# Patient Record
Sex: FEMALE | Race: WHITE | Employment: OTHER | URBAN - NONMETROPOLITAN AREA
[De-identification: names, ages, dates, MRNs, and addresses within clinical notes are randomized per-mention and may not be internally consistent; named-entity substitution may affect disease eponyms.]

---

## 2017-02-07 PROBLEM — K56.609 LARGE BOWEL OBSTRUCTION (HCC): Status: ACTIVE | Noted: 2017-02-07

## 2017-02-08 PROBLEM — D50.9 MICROCYTIC HYPOCHROMIC ANEMIA: Status: ACTIVE | Noted: 2017-02-08

## 2017-02-21 ENCOUNTER — TELEPHONE (OUTPATIENT)
Age: 27
End: 2017-02-21

## 2017-02-27 ENCOUNTER — OFFICE VISIT (OUTPATIENT)
Age: 27
End: 2017-02-27

## 2017-02-27 VITALS
OXYGEN SATURATION: 94 % | HEART RATE: 66 BPM | SYSTOLIC BLOOD PRESSURE: 90 MMHG | TEMPERATURE: 98.3 F | WEIGHT: 140 LBS | HEIGHT: 64 IN | DIASTOLIC BLOOD PRESSURE: 66 MMHG | BODY MASS INDEX: 23.9 KG/M2 | RESPIRATION RATE: 16 BRPM

## 2017-02-27 DIAGNOSIS — K56.609 COLONIC OBSTRUCTION (HCC): ICD-10-CM

## 2017-02-27 DIAGNOSIS — R14.0 ABDOMINAL DISTENSION: Primary | ICD-10-CM

## 2017-02-27 PROCEDURE — 99214 OFFICE O/P EST MOD 30 MIN: CPT | Performed by: SURGERY

## 2017-02-27 ASSESSMENT — ENCOUNTER SYMPTOMS
TROUBLE SWALLOWING: 0
NAUSEA: 0
BACK PAIN: 0
CONSTIPATION: 0
DIARRHEA: 0
SHORTNESS OF BREATH: 0
ABDOMINAL PAIN: 0
COUGH: 0
ABDOMINAL DISTENTION: 0

## 2017-03-07 ENCOUNTER — OFFICE VISIT (OUTPATIENT)
Dept: CARDIOLOGY | Age: 27
End: 2017-03-07

## 2017-03-07 VITALS
WEIGHT: 138 LBS | HEART RATE: 72 BPM | DIASTOLIC BLOOD PRESSURE: 62 MMHG | BODY MASS INDEX: 23.56 KG/M2 | SYSTOLIC BLOOD PRESSURE: 98 MMHG | HEIGHT: 64 IN

## 2017-03-07 DIAGNOSIS — Z01.810 PREOP CARDIOVASCULAR EXAM: ICD-10-CM

## 2017-03-07 PROBLEM — I95.1 POSTURAL HYPOTENSION: Status: ACTIVE | Noted: 2017-03-07

## 2017-03-07 PROCEDURE — 99213 OFFICE O/P EST LOW 20 MIN: CPT | Performed by: INTERNAL MEDICINE

## 2017-03-07 RX ORDER — MIDODRINE HYDROCHLORIDE 2.5 MG/1
2.5 TABLET ORAL 2 TIMES DAILY
Qty: 180 TABLET | Refills: 3 | Status: SHIPPED | OUTPATIENT
Start: 2017-03-07 | End: 2017-06-27 | Stop reason: DRUGHIGH

## 2017-03-20 ENCOUNTER — TELEPHONE (OUTPATIENT)
Dept: PULMONOLOGY | Age: 27
End: 2017-03-20

## 2017-03-20 ENCOUNTER — OFFICE VISIT (OUTPATIENT)
Dept: PULMONOLOGY | Age: 27
End: 2017-03-20

## 2017-03-20 VITALS
HEIGHT: 64 IN | BODY MASS INDEX: 24.59 KG/M2 | DIASTOLIC BLOOD PRESSURE: 64 MMHG | WEIGHT: 144 LBS | HEART RATE: 69 BPM | SYSTOLIC BLOOD PRESSURE: 96 MMHG | OXYGEN SATURATION: 98 % | TEMPERATURE: 97.3 F

## 2017-03-20 DIAGNOSIS — R93.89 ABNORMAL CHEST X-RAY: ICD-10-CM

## 2017-03-20 DIAGNOSIS — G80.9 CEREBRAL PALSY, UNSPECIFIED TYPE (HCC): ICD-10-CM

## 2017-03-20 DIAGNOSIS — G47.10 HYPERSOMNIA: Primary | ICD-10-CM

## 2017-03-20 DIAGNOSIS — J45.909 MODERATE ASTHMA: ICD-10-CM

## 2017-03-20 PROCEDURE — 99214 OFFICE O/P EST MOD 30 MIN: CPT | Performed by: INTERNAL MEDICINE

## 2017-03-20 RX ORDER — BUDESONIDE 0.5 MG/2ML
1 INHALANT ORAL 2 TIMES DAILY
Qty: 60 AMPULE | Refills: 11 | Status: SHIPPED | OUTPATIENT
Start: 2017-03-20 | End: 2018-03-31 | Stop reason: SDUPTHER

## 2017-03-20 RX ORDER — ALBUTEROL SULFATE 2.5 MG/3ML
2.5 SOLUTION RESPIRATORY (INHALATION) EVERY 6 HOURS PRN
Qty: 120 VIAL | Refills: 11 | Status: SHIPPED | OUTPATIENT
Start: 2017-03-20 | End: 2018-03-31 | Stop reason: SDUPTHER

## 2017-06-27 ENCOUNTER — OFFICE VISIT (OUTPATIENT)
Dept: CARDIOLOGY | Age: 27
End: 2017-06-27

## 2017-06-27 VITALS
HEART RATE: 84 BPM | DIASTOLIC BLOOD PRESSURE: 50 MMHG | BODY MASS INDEX: 25.27 KG/M2 | SYSTOLIC BLOOD PRESSURE: 88 MMHG | HEIGHT: 64 IN | WEIGHT: 148 LBS

## 2017-06-27 DIAGNOSIS — I95.1 POSTURAL HYPOTENSION: ICD-10-CM

## 2017-06-27 DIAGNOSIS — R00.0 TACHYCARDIA: ICD-10-CM

## 2017-06-27 DIAGNOSIS — Z00.00 ANNUAL PHYSICAL EXAM: Primary | ICD-10-CM

## 2017-06-27 PROCEDURE — 99213 OFFICE O/P EST LOW 20 MIN: CPT | Performed by: INTERNAL MEDICINE

## 2017-06-27 PROCEDURE — 93000 ELECTROCARDIOGRAM COMPLETE: CPT | Performed by: INTERNAL MEDICINE

## 2017-06-27 RX ORDER — MIDODRINE HYDROCHLORIDE 5 MG/1
5 TABLET ORAL 2 TIMES DAILY
Qty: 90 TABLET | Refills: 0 | Status: SHIPPED | OUTPATIENT
Start: 2017-06-27 | End: 2017-09-06 | Stop reason: SDUPTHER

## 2017-06-27 RX ORDER — MIDODRINE HYDROCHLORIDE 5 MG/1
5 TABLET ORAL 2 TIMES DAILY
COMMUNITY
End: 2017-06-27 | Stop reason: SDUPTHER

## 2017-06-27 RX ORDER — MINOCYCLINE HYDROCHLORIDE 100 MG/1
100 CAPSULE ORAL 2 TIMES DAILY
COMMUNITY
End: 2019-08-01

## 2017-09-07 ENCOUNTER — HOSPITAL ENCOUNTER (EMERGENCY)
Age: 27
Discharge: HOME | End: 2017-09-07
Payer: COMMERCIAL

## 2017-09-07 ENCOUNTER — HOSPITAL ENCOUNTER (EMERGENCY)
Dept: GENERAL RADIOLOGY | Age: 27
Discharge: HOME | End: 2017-09-07
Payer: COMMERCIAL

## 2017-09-07 VITALS
OXYGEN SATURATION: 94 % | SYSTOLIC BLOOD PRESSURE: 114 MMHG | HEART RATE: 97 BPM | TEMPERATURE: 100 F | DIASTOLIC BLOOD PRESSURE: 74 MMHG | RESPIRATION RATE: 18 BRPM | WEIGHT: 155.6 LBS | BODY MASS INDEX: 25.92 KG/M2 | HEIGHT: 65 IN

## 2017-09-07 DIAGNOSIS — J40 BRONCHITIS: ICD-10-CM

## 2017-09-07 DIAGNOSIS — J45.901 ASTHMA WITH ACUTE EXACERBATION, UNSPECIFIED ASTHMA SEVERITY: Primary | ICD-10-CM

## 2017-09-07 PROCEDURE — 71020 XR CHEST STANDARD TWO VW: CPT

## 2017-09-07 PROCEDURE — 99213 OFFICE O/P EST LOW 20 MIN: CPT | Performed by: NURSE PRACTITIONER

## 2017-09-07 PROCEDURE — 6370000000 HC RX 637 (ALT 250 FOR IP): Performed by: NURSE PRACTITIONER

## 2017-09-07 PROCEDURE — 99215 OFFICE O/P EST HI 40 MIN: CPT

## 2017-09-07 RX ORDER — AZITHROMYCIN 250 MG
CAPSULE ORAL
Qty: 6 TABLET | Refills: 0 | Status: SHIPPED | OUTPATIENT
Start: 2017-09-07 | End: 2017-09-17

## 2017-09-07 RX ORDER — IPRATROPIUM BROMIDE AND ALBUTEROL SULFATE 2.5; .5 MG/3ML; MG/3ML
1 SOLUTION RESPIRATORY (INHALATION) ONCE
Status: COMPLETED | OUTPATIENT
Start: 2017-09-07 | End: 2017-09-07

## 2017-09-07 RX ORDER — PREDNISONE 20 MG/1
20 TABLET ORAL 2 TIMES DAILY
Qty: 10 TABLET | Refills: 0 | Status: SHIPPED | OUTPATIENT
Start: 2017-09-07 | End: 2017-09-12

## 2017-09-07 RX ORDER — MIDODRINE HYDROCHLORIDE 5 MG/1
TABLET ORAL
Qty: 180 TABLET | Refills: 0 | Status: SHIPPED | OUTPATIENT
Start: 2017-09-07 | End: 2017-12-19 | Stop reason: SDUPTHER

## 2017-09-07 RX ORDER — DEXTROMETHORPHAN HYDROBROMIDE AND PROMETHAZINE HYDROCHLORIDE 15; 6.25 MG/5ML; MG/5ML
5 SYRUP ORAL 4 TIMES DAILY PRN
Qty: 50 ML | Refills: 0 | Status: SHIPPED | OUTPATIENT
Start: 2017-09-07 | End: 2017-09-14

## 2017-09-07 RX ADMIN — IPRATROPIUM BROMIDE AND ALBUTEROL SULFATE 1 AMPULE: .5; 3 SOLUTION RESPIRATORY (INHALATION) at 19:54

## 2017-09-07 ASSESSMENT — ENCOUNTER SYMPTOMS
DIARRHEA: 0
WHEEZING: 0
COUGH: 1
NAUSEA: 0
VOMITING: 0
RHINORRHEA: 1
ABDOMINAL PAIN: 0
COLOR CHANGE: 0

## 2017-09-07 ASSESSMENT — PAIN DESCRIPTION - LOCATION: LOCATION: CHEST

## 2017-09-07 ASSESSMENT — PAIN SCALES - WONG BAKER: WONGBAKER_NUMERICALRESPONSE: 2

## 2017-11-07 ENCOUNTER — OFFICE VISIT (OUTPATIENT)
Dept: PULMONOLOGY | Age: 27
End: 2017-11-07
Payer: COMMERCIAL

## 2017-11-07 VITALS
BODY MASS INDEX: 26.69 KG/M2 | HEIGHT: 65 IN | HEART RATE: 76 BPM | DIASTOLIC BLOOD PRESSURE: 70 MMHG | OXYGEN SATURATION: 98 % | WEIGHT: 160.2 LBS | SYSTOLIC BLOOD PRESSURE: 102 MMHG | TEMPERATURE: 98.3 F

## 2017-11-07 DIAGNOSIS — R06.83 SNORING: ICD-10-CM

## 2017-11-07 DIAGNOSIS — J96.11 CHRONIC RESPIRATORY FAILURE WITH HYPOXIA (HCC): ICD-10-CM

## 2017-11-07 DIAGNOSIS — R06.00 DYSPNEA AND RESPIRATORY ABNORMALITIES: ICD-10-CM

## 2017-11-07 DIAGNOSIS — G80.9 CEREBRAL PALSY, UNSPECIFIED TYPE (HCC): ICD-10-CM

## 2017-11-07 DIAGNOSIS — J98.6 PARALYSIS OF DIAPHRAGM: ICD-10-CM

## 2017-11-07 DIAGNOSIS — G47.19 EXCESSIVE DAYTIME SLEEPINESS: ICD-10-CM

## 2017-11-07 DIAGNOSIS — J45.909 MODERATE ASTHMA WITHOUT COMPLICATION, UNSPECIFIED WHETHER PERSISTENT: ICD-10-CM

## 2017-11-07 DIAGNOSIS — R06.89 DYSPNEA AND RESPIRATORY ABNORMALITIES: ICD-10-CM

## 2017-11-07 PROCEDURE — G8484 FLU IMMUNIZE NO ADMIN: HCPCS | Performed by: PHYSICIAN ASSISTANT

## 2017-11-07 PROCEDURE — G8427 DOCREV CUR MEDS BY ELIG CLIN: HCPCS | Performed by: PHYSICIAN ASSISTANT

## 2017-11-07 PROCEDURE — G8598 ASA/ANTIPLAT THER USED: HCPCS | Performed by: PHYSICIAN ASSISTANT

## 2017-11-07 PROCEDURE — 94620 6 MIN WALK TEST: CPT | Performed by: PHYSICIAN ASSISTANT

## 2017-11-07 PROCEDURE — G8417 CALC BMI ABV UP PARAM F/U: HCPCS | Performed by: PHYSICIAN ASSISTANT

## 2017-11-07 PROCEDURE — 1036F TOBACCO NON-USER: CPT | Performed by: PHYSICIAN ASSISTANT

## 2017-11-07 PROCEDURE — 99215 OFFICE O/P EST HI 40 MIN: CPT | Performed by: PHYSICIAN ASSISTANT

## 2017-11-07 ASSESSMENT — ENCOUNTER SYMPTOMS
NAUSEA: 0
COUGH: 1
WHEEZING: 1
VOMITING: 0
HEARTBURN: 0
SHORTNESS OF BREATH: 1
EYES NEGATIVE: 1

## 2017-11-07 NOTE — PROGRESS NOTES
[facility]    Patient: Joao Velez, 28 y.o.   : 1990  2017    Pt of Dr. Adam Beth   Patient presents with    Asthma     8 month fu with CXR 17, need 6 minute walk per [facility]        HPI  Josue Xiong is here for a 8 month follow up for asthma with a CXR 17. She uses O2 at night and with exertion. She has been coughing and having runny nose periodically. She was seen in urgent care and was treated with ATB. She had drops her sats with URI and bronchitis. She had trouble with increased dyspnea with humidity but had improvement with O2. She uses neb treatments with Pulmicort and albuterol. They are helpful. She has been seen at [facility] and a diaphragm pacer was recommended but her mom declined. She has trouble coughing up secretions. She is not capable of using Acapella secondary to cognative function. Progress History:   Since last visit any new medical issues? Yes Bronchitis  New ER or hospitlal visits? No  Any new or changes in medicines? No  Using inhalers? Yes Pulmicort and Albuterol  Are they helpful? Yes     LAST Office visit with Dr. Martha Khan  3/20/2017  Assessment:  -Moderate persistent bronchial asthma- Under control with current treatment.  -Nocturnal hypoxia- On Home O2 at night time. ? Sleep apnea. 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 She was evaluated at [facility]. Patient mother did not want to go for [facility] for diaphragmatic pacemaker placement as recommended by [facility] pulmonary.  -Dyspnea due to Paralysis of right hemidiaphragm Vs Moderate persistent bronchial asthma- improved  -Acute respiratory failure due to  Mucus plugging requiring intubation S/p bronch on 3/28/16. All cultures were negative so far. -Abnormal CT chest with chronic changes due to post inflammatory scarring with hx of Right lowe lobe aspiration pneumonia. She was treated with antibiotics.   -Hypersomnia ( Excessive daytime sleepiness)may be due to obstructive sleep apnea  Vs Inadequate sleep hygiene- she never had sleep test so far. -Hx of Staphylococcus hominis ssp. Hominis in pleural fluid- most likely due to contamination from skin Danni  -Small bowel obstruction with laparotomy,lysis of adhesions reduction of internal hernia and closure of defect, decompression small bowel contents per Dr. Andrade Nunes on 3/25/16        Plan:  -Continue pulmicort 0.5mg via neb bid. Refills given  -Continue Albuterol 2.5mg via nebs Q6h prn in alternation with Albuterol HFA 2 puffs Q6h i.e Patient advised to not to take both inhaler and nebs at a time.  -She was advised to practice deep breathing exercises and continue incentive spirometry to use Q4h as tolerated. -Will reschedule patient for nocturnal polysomnogram (Sleep study) at [facility] sleep lab. Patient educated to not to drive any motor vehicles or operate heavy equipment until sleep symptoms are under control. Patient verbalizes understanding. Patient to follow with my clinic at [address] [place] [address] 1week after sleep study ( Sleep clinic at [facility])  -Verna Rivera needs no home O2 at rest. She needs 1LPM of home O2 with exercise and 2LPM of home O2 at night time. - She was educated to follow speech pathology recommendations i.e aspiration precautions. - Patient educated to update his pneumococcal vaccine with family physician and take influenza vaccine in coming season with out fail. Patient verbalizes understanding. .  - Keep scheduled appt with Neurologist for further evaluation of her cerebral palsy and muscle weakness. .  - Schedule patient for follow up with my clinic in 6months for clinical re evaluation. Patient advised to make early appointment if needed. Keira Tabares and her mother were educated about my impression and plan. They verbalizes understanding.        Past Medical hx   PMH:  Past Medical History:   Diagnosis Date    Anemia     Asthma     [facility] [address]     COPD (chronic obstructive pulmonary disease) (HCC)     GERD (gastroesophageal reflux disease)     Heart attack 3/2016    Mental retardation     Sch[facility]ia simplex [facility] [address]      SURGICAL HISTORY:  Past Surgical History:   Procedure Laterality Date    ABDOMINAL EXPLORATION SURGERY  5/27/16    lysis of adhesions    COLON SURGERY      COLONOSCOPY      EYE SURGERY  1996    GASTRIC FUNDOPLICATION      LAPAROTOMY  3/25/16    exploratory, lysis of adhesions, reduction of internal hernia     SOCIAL HISTORY:  Social History   Substance Use Topics    Smoking status: Passive Smoke Exposure - Never Smoker    Smokeless tobacco: Never Used    Alcohol use No     ALLERGIES:No Known Allergies  FAMILY HISTORY:  Family History   Problem Relation Age of Onset    Diabetes Mother     High Blood Pressure Mother     Colon Cancer Neg Hx     Breast Cancer Neg Hx     Cancer Neg Hx      CURRENT MEDICATIONS:  Current Outpatient Prescriptions   Medication Sig Dispense Refill    midodrine (PROAMATINE) 5 MG tablet take 1 tablet by mouth twice a day 180 tablet 0    minocycline (MINOCIN;DYNACIN) 100 MG capsule Take 100 mg by mouth 2 times daily      metoprolol tartrate (LOPRESSOR) 25 MG tablet Take 1 tablet by mouth 2 times daily 180 tablet 1    budesonide (PULMICORT) 0.5 MG/2ML nebulizer suspension Take 2 mLs by nebulization 2 times daily 60 ampule 11    albuterol (PROVENTIL) (2.5 MG/3ML) 0.083% nebulizer solution Take 3 mLs by nebulization every 6 hours as needed for Wheezing or Shortness of Breath 120 vial 11    Cetirizine HCl (ZYRTEC ALLERGY CHILDRENS) 10 MG TBDP Take 10 mg by mouth daily 15 tablet 0    docusate sodium (COLACE) 100 MG capsule Take 100 mg by mouth 2 times daily       QUEtiapine (SEROQUEL) 200 MG tablet Take 300 mg by mouth nightly       Sertraline HCl (ZOLOFT PO) Take 150 mg by mouth every morning       ALPRAZolam (XANAX PO) Take 1 mg by mouth 3 times daily as needed.        No current facility-administered medications for this visit. Vilma FERREIRA   Review of Systems   Constitutional: Negative for chills and fever. HENT: Negative for congestion. Eyes: Negative. Respiratory: Positive for cough, shortness of breath and wheezing. Cardiovascular: Negative for chest pain, palpitations and leg swelling. Gastrointestinal: Negative for heartburn, nausea and vomiting. Genitourinary: Negative for dysuria. Neurological:        MRDD   Endo/Heme/Allergies: Negative. All other systems reviewed and are negative. Physical exam   /70 (Site: Right Arm, Position: Sitting, Cuff Size: Large Adult)   Pulse 76   Temp 98.3 °F (36.8 °C) (Oral)   Ht 5' 5\" (1.651 m)   Wt 160 lb 3.2 oz (72.7 kg)   SpO2 98% Comment: RA at rest  BMI 26.66 kg/m²    Neck Circumference -  14.75in. Mallampati - 4    Physical Exam   Constitutional: She is oriented to person, place, and time and well-developed, well-nourished, and in no distress. HENT:   Head: Normocephalic and atraumatic. Mouth/Throat: No oropharyngeal exudate. Eyes: Conjunctivae and EOM are normal. Pupils are equal, round, and reactive to light. Neck: Normal range of motion. Neck supple. Cardiovascular: Normal rate, regular rhythm and normal heart sounds. Exam reveals no friction rub. No murmur heard. Pulmonary/Chest: She has no wheezes. She has no rales. She appears to have abdominal breathing   Abdominal: Bowel sounds are normal. She exhibits no distension. There is no tenderness. Musculoskeletal: Normal range of motion. She exhibits no edema or tenderness. Neurological: She is alert and oriented to person, place, and time. Skin: Skin is warm and dry. No rash noted. Psychiatric: Affect and judgment normal.   Nursing note and vitals reviewed. Test results   CXR-  9/07/2017  NO EVIDENCE OF ACUTE CARDIOPULMONARY PROCESS. Sniff test 7/26/2016   1.   Elevated right hemidiaphragm which demonstrates only minimal excursion suggestive

## 2017-11-29 ENCOUNTER — HOSPITAL ENCOUNTER (OUTPATIENT)
Dept: RESPIRATORY THERAPY | Age: 27
Discharge: HOME | End: 2017-11-29
Payer: COMMERCIAL

## 2017-11-29 PROCEDURE — 94762 N-INVAS EAR/PLS OXIMTRY CONT: CPT

## 2017-12-08 ENCOUNTER — TELEPHONE (OUTPATIENT)
Dept: SLEEP CENTER | Age: 27
End: 2017-12-08

## 2017-12-08 NOTE — TELEPHONE ENCOUNTER
Her mother has declined in lab PSG in the past.  Please check and see if she will pursue and I will order

## 2017-12-08 NOTE — TELEPHONE ENCOUNTER
From [employer];  Good Morning,  is scheduled for an HST, but she has [employer] and they dont cover HSTs. Let me know if it will be changed to in-lab PSG. If the patient is willing to come in for a PSG we can get her scheduled. Hopefully this will clear up the [address] and HST confusion. Please advise.   May Corcoran, SUSHIL

## 2017-12-13 ENCOUNTER — TELEPHONE (OUTPATIENT)
Dept: PULMONOLOGY | Age: 27
End: 2017-12-13

## 2017-12-18 ENCOUNTER — TELEPHONE (OUTPATIENT)
Dept: SLEEP CENTER | Age: 27
End: 2017-12-18

## 2017-12-19 RX ORDER — MIDODRINE HYDROCHLORIDE 5 MG/1
TABLET ORAL
Qty: 180 TABLET | Refills: 0 | Status: SHIPPED | OUTPATIENT
Start: 2017-12-19 | End: 2018-03-28 | Stop reason: SDUPTHER

## 2018-01-19 ENCOUNTER — OFFICE VISIT (OUTPATIENT)
Dept: CARDIOLOGY CLINIC | Age: 28
End: 2018-01-19
Payer: COMMERCIAL

## 2018-01-19 ENCOUNTER — HOSPITAL ENCOUNTER (OUTPATIENT)
Dept: GENERAL RADIOLOGY | Age: 28
Discharge: HOME | End: 2018-01-19
Payer: COMMERCIAL

## 2018-01-19 ENCOUNTER — HOSPITAL ENCOUNTER (OUTPATIENT)
Age: 28
Discharge: HOME | End: 2018-01-19
Payer: COMMERCIAL

## 2018-01-19 VITALS
HEIGHT: 65 IN | SYSTOLIC BLOOD PRESSURE: 100 MMHG | DIASTOLIC BLOOD PRESSURE: 62 MMHG | BODY MASS INDEX: 26.49 KG/M2 | HEART RATE: 62 BPM | WEIGHT: 159 LBS

## 2018-01-19 DIAGNOSIS — I95.0 IDIOPATHIC HYPOTENSION: Primary | ICD-10-CM

## 2018-01-19 DIAGNOSIS — R14.0 ABDOMINAL BLOATING: ICD-10-CM

## 2018-01-19 DIAGNOSIS — R42 DIZZINESS: ICD-10-CM

## 2018-01-19 PROCEDURE — 1036F TOBACCO NON-USER: CPT | Performed by: NUCLEAR MEDICINE

## 2018-01-19 PROCEDURE — G8484 FLU IMMUNIZE NO ADMIN: HCPCS | Performed by: NUCLEAR MEDICINE

## 2018-01-19 PROCEDURE — 99213 OFFICE O/P EST LOW 20 MIN: CPT | Performed by: NUCLEAR MEDICINE

## 2018-01-19 PROCEDURE — 74018 RADEX ABDOMEN 1 VIEW: CPT

## 2018-01-19 PROCEDURE — G8599 NO ASA/ANTIPLAT THER USE RNG: HCPCS | Performed by: NUCLEAR MEDICINE

## 2018-01-19 PROCEDURE — G8428 CUR MEDS NOT DOCUMENT: HCPCS | Performed by: NUCLEAR MEDICINE

## 2018-01-19 PROCEDURE — G8417 CALC BMI ABV UP PARAM F/U: HCPCS | Performed by: NUCLEAR MEDICINE

## 2018-01-19 NOTE — PROGRESS NOTES
(PROVENTIL) (2.5 MG/3ML) 0.083% nebulizer solution Take 3 mLs by nebulization every 6 hours as needed for Wheezing or Shortness of Breath 120 vial 11    Cetirizine HCl (ZYRTEC ALLERGY CHILDRENS) 10 MG TBDP Take 10 mg by mouth daily 15 tablet 0    docusate sodium (COLACE) 100 MG capsule Take 100 mg by mouth 2 times daily       QUEtiapine (SEROQUEL) 200 MG tablet Take 300 mg by mouth nightly       Sertraline HCl (ZOLOFT PO) Take 150 mg by mouth every morning       ALPRAZolam (XANAX PO) Take 1 mg by mouth 3 times daily as needed. No current facility-administered medications for this visit. No Known Allergies  Health Maintenance   Topic Date Due    HIV screen  03/28/2004    DTaP/Tdap/Td vaccine (1 - Tdap) 03/28/2008    Pneumococcal med risk (1 of 1 - PPSV23) 03/28/2008    Cervical cancer screen  03/28/2010    Flu vaccine (1) 09/01/2017       Subjective:  Review of Systems  General:   No fever, no chills, No fatigue or weight loss  Pulmonary:    some dyspnea, no wheezing  Cardiac:    Denies recent chest pain,   GI:     No nausea or vomiting, no abdominal pain  Neuro:    No dizziness or light headedness,   Musculoskeletal:  No recent active issues  Extremities:   No edema, good peripheral pulses      Objective:  Physical Exam  /62   Pulse 62   Ht 5' 5\" (1.651 m)   Wt 159 lb (72.1 kg)   BMI 26.46 kg/m²   General:   Well developed, well nourished  Lungs:    Clear to auscultation  Heart:    Normal S1 S2, Slight murmur. no rubs, no gallops  Abdomen:   Soft, non tender, no organomegalies, positive bowel sounds  Extremities:   No edema, no cyanosis, good peripheral pulses  Neurological:   Awake, alert, oriented. Slow mentally   Musculoskelatal:  No obvious deformities    Assessment:     1. Idiopathic hypotension     2. Dizziness     likely orthostatsis     Plan:  No Follow-up on file.   Tilt and holter  Cut down caffeine   Continue risk factor modification and medical management  Thank you for

## 2018-03-23 ENCOUNTER — HOSPITAL ENCOUNTER (OUTPATIENT)
Dept: NON INVASIVE DIAGNOSTICS | Age: 28
Discharge: HOME | End: 2018-03-23
Payer: COMMERCIAL

## 2018-03-23 DIAGNOSIS — R42 DIZZINESS: ICD-10-CM

## 2018-03-23 DIAGNOSIS — I95.0 IDIOPATHIC HYPOTENSION: ICD-10-CM

## 2018-03-23 PROCEDURE — 93225 XTRNL ECG REC<48 HRS REC: CPT

## 2018-03-23 PROCEDURE — 93226 XTRNL ECG REC<48 HR SCAN A/R: CPT

## 2018-03-29 RX ORDER — MIDODRINE HYDROCHLORIDE 5 MG/1
TABLET ORAL
Qty: 180 TABLET | Refills: 3 | Status: SHIPPED | OUTPATIENT
Start: 2018-03-29 | End: 2019-08-01 | Stop reason: SDUPTHER

## 2018-03-31 DIAGNOSIS — R93.89 ABNORMAL CHEST X-RAY: ICD-10-CM

## 2018-03-31 DIAGNOSIS — J45.909 MODERATE ASTHMA: ICD-10-CM

## 2018-04-03 ENCOUNTER — TELEPHONE (OUTPATIENT)
Dept: PULMONOLOGY | Age: 28
End: 2018-04-03

## 2018-04-03 RX ORDER — ALBUTEROL SULFATE 2.5 MG/3ML
SOLUTION RESPIRATORY (INHALATION)
Qty: 375 ML | Refills: 11 | Status: SHIPPED | OUTPATIENT
Start: 2018-04-03 | End: 2018-11-06 | Stop reason: SDUPTHER

## 2018-04-03 RX ORDER — BUDESONIDE 0.5 MG/2ML
INHALANT ORAL
Qty: 120 ML | Refills: 11 | Status: SHIPPED | OUTPATIENT
Start: 2018-04-03 | End: 2018-11-06 | Stop reason: SDUPTHER

## 2018-04-04 ENCOUNTER — TELEPHONE (OUTPATIENT)
Dept: PULMONOLOGY | Age: 28
End: 2018-04-04

## 2018-05-07 ENCOUNTER — OFFICE VISIT (OUTPATIENT)
Dept: PULMONOLOGY | Age: 28
End: 2018-05-07
Payer: COMMERCIAL

## 2018-05-07 VITALS
DIASTOLIC BLOOD PRESSURE: 66 MMHG | TEMPERATURE: 98.1 F | HEART RATE: 78 BPM | HEIGHT: 65 IN | SYSTOLIC BLOOD PRESSURE: 102 MMHG | BODY MASS INDEX: 27.49 KG/M2 | WEIGHT: 165 LBS | OXYGEN SATURATION: 98 %

## 2018-05-07 DIAGNOSIS — R06.89 DYSPNEA AND RESPIRATORY ABNORMALITIES: ICD-10-CM

## 2018-05-07 DIAGNOSIS — R06.00 DYSPNEA AND RESPIRATORY ABNORMALITIES: ICD-10-CM

## 2018-05-07 DIAGNOSIS — J98.6 PARALYSIS OF DIAPHRAGM: ICD-10-CM

## 2018-05-07 DIAGNOSIS — J45.909 MODERATE ASTHMA WITHOUT COMPLICATION, UNSPECIFIED WHETHER PERSISTENT: Primary | ICD-10-CM

## 2018-05-07 PROCEDURE — G8417 CALC BMI ABV UP PARAM F/U: HCPCS | Performed by: PHYSICIAN ASSISTANT

## 2018-05-07 PROCEDURE — G8599 NO ASA/ANTIPLAT THER USE RNG: HCPCS | Performed by: PHYSICIAN ASSISTANT

## 2018-05-07 PROCEDURE — 99213 OFFICE O/P EST LOW 20 MIN: CPT | Performed by: PHYSICIAN ASSISTANT

## 2018-05-07 PROCEDURE — G8427 DOCREV CUR MEDS BY ELIG CLIN: HCPCS | Performed by: PHYSICIAN ASSISTANT

## 2018-05-07 PROCEDURE — 1036F TOBACCO NON-USER: CPT | Performed by: PHYSICIAN ASSISTANT

## 2018-05-07 ASSESSMENT — ENCOUNTER SYMPTOMS
GASTROINTESTINAL NEGATIVE: 1
SINUS PAIN: 0
SHORTNESS OF BREATH: 0
NAUSEA: 0
COUGH: 1
WHEEZING: 0
SPUTUM PRODUCTION: 1
HEARTBURN: 0
EYES NEGATIVE: 1
SORE THROAT: 0
ORTHOPNEA: 0

## 2018-09-17 ENCOUNTER — HOSPITAL ENCOUNTER (OUTPATIENT)
Age: 28
Discharge: HOME | End: 2018-09-17
Payer: COMMERCIAL

## 2018-09-17 ENCOUNTER — HOSPITAL ENCOUNTER (OUTPATIENT)
Dept: GENERAL RADIOLOGY | Age: 28
Discharge: HOME | End: 2018-09-17
Payer: COMMERCIAL

## 2018-09-17 DIAGNOSIS — Z87.19 HX OF INTESTINAL OBSTRUCTION: ICD-10-CM

## 2018-09-17 DIAGNOSIS — K59.04 CHRONIC IDIOPATHIC CONSTIPATION: ICD-10-CM

## 2018-09-17 DIAGNOSIS — R10.33 PERIUMBILICAL ABDOMINAL PAIN: ICD-10-CM

## 2018-09-17 PROCEDURE — 74018 RADEX ABDOMEN 1 VIEW: CPT

## 2018-09-21 ENCOUNTER — TELEPHONE (OUTPATIENT)
Dept: CARDIOLOGY CLINIC | Age: 28
End: 2018-09-21

## 2018-09-26 PROBLEM — Z01.810 PREOP CARDIOVASCULAR EXAM: Status: RESOLVED | Noted: 2017-03-07 | Resolved: 2018-09-26

## 2018-10-03 ENCOUNTER — HOSPITAL ENCOUNTER (OUTPATIENT)
Dept: GENERAL RADIOLOGY | Age: 28
Discharge: HOME | End: 2018-10-03
Payer: COMMERCIAL

## 2018-10-03 ENCOUNTER — HOSPITAL ENCOUNTER (OUTPATIENT)
Age: 28
Discharge: HOME | End: 2018-10-03
Payer: COMMERCIAL

## 2018-10-03 DIAGNOSIS — K56.0 PARALYTIC ILEUS (HCC): ICD-10-CM

## 2018-10-03 DIAGNOSIS — Z87.19 HISTORY OF CONSTIPATION: ICD-10-CM

## 2018-10-03 LAB
ANION GAP SERPL CALCULATED.3IONS-SCNC: 12 MEQ/L (ref 8–16)
BUN BLDV-MCNC: 14 MG/DL (ref 7–22)
CALCIUM SERPL-MCNC: 8.8 MG/DL (ref 8.5–10.5)
CHLORIDE BLD-SCNC: 99 MEQ/L (ref 98–111)
CO2: 25 MEQ/L (ref 23–33)
CREAT SERPL-MCNC: 1 MG/DL (ref 0.4–1.2)
ERYTHROCYTE [DISTWIDTH] IN BLOOD BY AUTOMATED COUNT: 14.9 % (ref 11.5–14.5)
ERYTHROCYTE [DISTWIDTH] IN BLOOD BY AUTOMATED COUNT: 50.3 FL (ref 35–45)
GFR SERPL CREATININE-BSD FRML MDRD: 65 ML/MIN/1.73M2
GLUCOSE BLD-MCNC: 138 MG/DL (ref 70–108)
HCT VFR BLD CALC: 38.8 % (ref 37–47)
HEMOGLOBIN: 12.3 GM/DL (ref 12–16)
MCH RBC QN AUTO: 29.3 PG (ref 26–33)
MCHC RBC AUTO-ENTMCNC: 31.7 GM/DL (ref 32.2–35.5)
MCV RBC AUTO: 92.4 FL (ref 81–99)
PLATELET # BLD: 301 THOU/MM3 (ref 130–400)
PMV BLD AUTO: 11.7 FL (ref 9.4–12.4)
POTASSIUM SERPL-SCNC: 4.2 MEQ/L (ref 3.5–5.2)
RBC # BLD: 4.2 MILL/MM3 (ref 4.2–5.4)
SODIUM BLD-SCNC: 136 MEQ/L (ref 135–145)
WBC # BLD: 5.4 THOU/MM3 (ref 4.8–10.8)

## 2018-10-03 PROCEDURE — 36415 COLL VENOUS BLD VENIPUNCTURE: CPT

## 2018-10-03 PROCEDURE — 80048 BASIC METABOLIC PNL TOTAL CA: CPT

## 2018-10-03 PROCEDURE — 74018 RADEX ABDOMEN 1 VIEW: CPT

## 2018-10-03 PROCEDURE — 85027 COMPLETE CBC AUTOMATED: CPT

## 2018-11-06 ENCOUNTER — OFFICE VISIT (OUTPATIENT)
Dept: PULMONOLOGY | Age: 28
End: 2018-11-06
Payer: COMMERCIAL

## 2018-11-06 VITALS
BODY MASS INDEX: 28.82 KG/M2 | HEIGHT: 65 IN | TEMPERATURE: 98.2 F | OXYGEN SATURATION: 98 % | WEIGHT: 173 LBS | HEART RATE: 73 BPM | DIASTOLIC BLOOD PRESSURE: 60 MMHG | SYSTOLIC BLOOD PRESSURE: 122 MMHG

## 2018-11-06 DIAGNOSIS — G47.19 EXCESSIVE DAYTIME SLEEPINESS: ICD-10-CM

## 2018-11-06 DIAGNOSIS — J45.909 MODERATE ASTHMA WITHOUT COMPLICATION, UNSPECIFIED WHETHER PERSISTENT: ICD-10-CM

## 2018-11-06 DIAGNOSIS — R06.83 SNORING: ICD-10-CM

## 2018-11-06 DIAGNOSIS — J98.6 PARALYSIS OF DIAPHRAGM: ICD-10-CM

## 2018-11-06 PROCEDURE — 1036F TOBACCO NON-USER: CPT | Performed by: NURSE PRACTITIONER

## 2018-11-06 PROCEDURE — G8599 NO ASA/ANTIPLAT THER USE RNG: HCPCS | Performed by: NURSE PRACTITIONER

## 2018-11-06 PROCEDURE — G8417 CALC BMI ABV UP PARAM F/U: HCPCS | Performed by: NURSE PRACTITIONER

## 2018-11-06 PROCEDURE — G8484 FLU IMMUNIZE NO ADMIN: HCPCS | Performed by: NURSE PRACTITIONER

## 2018-11-06 PROCEDURE — G8427 DOCREV CUR MEDS BY ELIG CLIN: HCPCS | Performed by: NURSE PRACTITIONER

## 2018-11-06 PROCEDURE — 99214 OFFICE O/P EST MOD 30 MIN: CPT | Performed by: NURSE PRACTITIONER

## 2018-11-06 RX ORDER — BUDESONIDE 0.5 MG/2ML
INHALANT ORAL
Qty: 120 ML | Refills: 11 | Status: SHIPPED | OUTPATIENT
Start: 2018-11-06 | End: 2019-12-10 | Stop reason: SDUPTHER

## 2018-11-06 RX ORDER — ALBUTEROL SULFATE 2.5 MG/3ML
SOLUTION RESPIRATORY (INHALATION)
Qty: 375 ML | Refills: 11 | Status: SHIPPED | OUTPATIENT
Start: 2018-11-06 | End: 2019-12-10 | Stop reason: SDUPTHER

## 2018-11-06 ASSESSMENT — ENCOUNTER SYMPTOMS
DIARRHEA: 0
ABDOMINAL PAIN: 0
SHORTNESS OF BREATH: 1
NAUSEA: 0
WHEEZING: 1
VOMITING: 0
COUGH: 0
EYES NEGATIVE: 1

## 2018-11-06 NOTE — PROGRESS NOTES
[facility]    Patient: Keyla Toscano, 33 y.o.   : 1990  2018    Pt of Dr. Zac Aguirre   Patient presents with    Follow-up     SOB 6mo f/u, nebulizer machine not working        HPI  Ольга Nicole is here for follow up for SOB   PMH of bronchopulmonary  Dysplasia, diaphragm paralysis, and MRDD. Has had sleep study scheduled in past but patient gets scared and does not follow through with testing  Mother is primary care giver and presents with her today. C/o chronic daytime fatigue and restless sleep with snoring  Occasional SOB. Using pulmicort nebs BID and ALbuterol nebs TID. Requests new nebulizer machine  No ER visits or hospitalizations  No cough or fever. Sleeping habits:    Sleep History:  Pt with history of: snoring, tossing and turning, excessive daytime sleepiness and/or daytime fatigue and morning headache  Morning headache:Yes,   Dryness of mouth in the morning:Yes  Hx of snoring:Yes  Witnessed apneas:No -patterns of abnormal breathing   Excessive day time sleepiness:Yes. See below for Aleppo score  Hypnogogic Hallucinations:NO  Hypnopompic Hallucinations:NO  Symptoms suggestive of Restless leg syndrome:NO  History of Seizures: YES   Sleep Walking:NO  Sleep Talking:NO  Sleep paralysis: NO  Cataplexy: NO      Aleppo Sleepiness Score:   Sitting and readin  Watching TV:0  Sitting inactive in a public place:0  Being a passenger in a motor vehicle for an hour or more:0  Lying down in the afternoon:1  Sitting and talking to someone:0  Sitting quietly after lunch (no alcohol):3  Stopped for a few minutes in traffic while drivin  Total Score:6    She is currently does not work, has MRDD  She denies any difficulty in sleeping at new places . She drinks0 cups of coffee per day. Shedrinks 1 cans of caffeinated beverages i.e sodas per day. Shedrinks 0 cups of tea per day. Shedrinks alcoholic beverages socially.  No history of recreational drug use.       Mallampati airway Class:IV  Neck Circumference:15.5 Inches    Patient considerations: MMRD     Past Medical hx   PMH:  Past Medical History:   Diagnosis Date    Anemia     Asthma     [facility] ([facility] [address]     [facility]     GERD (gastroesophageal reflux disease)     Heart attack ([facility] [address] 3/2016    Mental retardation     Schizophrenia simplex ([facility]      SURGICAL HISTORY:  Past Surgical History:   Procedure Laterality Date    ABDOMINAL EXPLORATION SURGERY  5/27/16    lysis of adhesions    COLON SURGERY      COLONOSCOPY      EYE SURGERY  1996    GASTRIC FUNDOPLICATION      LAPAROTOMY  3/25/16    exploratory, lysis of adhesions, reduction of internal hernia     SOCIAL HISTORY:  Social History   Substance Use Topics    Smoking status: Passive Smoke Exposure - Never Smoker    Smokeless tobacco: Never Used    Alcohol use No     ALLERGIES:No Known Allergies  FAMILY HISTORY:  Family History   Problem Relation Age of Onset    Diabetes Mother     High Blood Pressure Mother     Colon Cancer Neg Hx     Breast Cancer Neg Hx     Cancer Neg Hx      CURRENT MEDICATIONS:  Current Outpatient Prescriptions   Medication Sig Dispense Refill    budesonide (PULMICORT) 0.5 MG/2ML nebulizer suspension inhale contents of 1 vial in nebulizer twice a day 120 mL 11    albuterol (PROVENTIL) (2.5 MG/3ML) 0.083% nebulizer solution inhale contents of 1 vial in nebulizer every 6 hours if needed FOR WHEEZING OR SHORTNESS OF BREATH 375 mL 11    psyllium (METAMUCIL) 0.52 g capsule Take 1.04 g by mouth daily      polyethylene glycol (MIRALAX) powder Take 17 g by mouth daily 510 g 6    etonogestrel (NEXPLANON) 68 MG implant 68 mg by Subdermal route once      bisacodyl (DULCOLAX) 5 MG EC tablet Take 2 tablets by mouth daily as needed for Constipation 60 tablet 3    midodrine (PROAMATINE) 5 MG tablet take 1 tablet by mouth twice a day 180 tablet 3   

## 2018-12-04 ENCOUNTER — TELEPHONE (OUTPATIENT)
Dept: SLEEP CENTER | Age: 28
End: 2018-12-04

## 2018-12-04 NOTE — TELEPHONE ENCOUNTER
I will also forward this message to Ms Tiffanie Souza. Patient is currently following with her clinic.

## 2018-12-04 NOTE — TELEPHONE ENCOUNTER
Mendel Casiano was scheduled for a Baseline PSG Study [facility]. When I reached mother , Vibha Herr she informed me Mendel Casiano didn't want to do the sleep study and she could not make her. I cancelled the study and let her know I would notify her [address] Follow up cancelled also.     Mely Gil

## 2018-12-18 ENCOUNTER — HOSPITAL ENCOUNTER (OUTPATIENT)
Dept: GENERAL RADIOLOGY | Age: 28
Discharge: HOME | End: 2018-12-18
Payer: COMMERCIAL

## 2018-12-18 ENCOUNTER — HOSPITAL ENCOUNTER (OUTPATIENT)
Age: 28
Discharge: HOME | End: 2018-12-18
Payer: COMMERCIAL

## 2018-12-18 DIAGNOSIS — Z87.19 HISTORY OF ILEUS: ICD-10-CM

## 2018-12-18 DIAGNOSIS — Z87.19 HISTORY OF SMALL BOWEL OBSTRUCTION: ICD-10-CM

## 2018-12-18 DIAGNOSIS — K59.09 CHRONIC CONSTIPATION: ICD-10-CM

## 2018-12-18 LAB
ALBUMIN SERPL-MCNC: 4.3 G/DL (ref 3.5–5.1)
ALP BLD-CCNC: 104 U/L (ref 38–126)
ALT SERPL-CCNC: 12 U/L (ref 11–66)
ANION GAP SERPL CALCULATED.3IONS-SCNC: 14 MEQ/L (ref 8–16)
AST SERPL-CCNC: 18 U/L (ref 5–40)
AVERAGE GLUCOSE: 108 MG/DL (ref 70–126)
BASOPHILS # BLD: 0.7 %
BASOPHILS ABSOLUTE: 0 THOU/MM3 (ref 0–0.1)
BILIRUB SERPL-MCNC: 0.6 MG/DL (ref 0.3–1.2)
BUN BLDV-MCNC: 8 MG/DL (ref 7–22)
CALCIUM SERPL-MCNC: 9.5 MG/DL (ref 8.5–10.5)
CHLORIDE BLD-SCNC: 98 MEQ/L (ref 98–111)
CHOLESTEROL, TOTAL: 151 MG/DL (ref 100–199)
CO2: 25 MEQ/L (ref 23–33)
CREAT SERPL-MCNC: 0.9 MG/DL (ref 0.4–1.2)
EOSINOPHIL # BLD: 2.4 %
EOSINOPHILS ABSOLUTE: 0.1 THOU/MM3 (ref 0–0.4)
ERYTHROCYTE [DISTWIDTH] IN BLOOD BY AUTOMATED COUNT: 13 % (ref 11.5–14.5)
ERYTHROCYTE [DISTWIDTH] IN BLOOD BY AUTOMATED COUNT: 42.5 FL (ref 35–45)
GFR SERPL CREATININE-BSD FRML MDRD: 74 ML/MIN/1.73M2
GLUCOSE BLD-MCNC: 104 MG/DL (ref 70–108)
HBA1C MFR BLD: 5.6 % (ref 4.4–6.4)
HCT VFR BLD CALC: 42.1 % (ref 37–47)
HDLC SERPL-MCNC: 45 MG/DL
HEMOGLOBIN: 13.2 GM/DL (ref 12–16)
IMMATURE GRANS (ABS): 0.01 THOU/MM3 (ref 0–0.07)
IMMATURE GRANULOCYTES: 0.2 %
LDL CHOLESTEROL CALCULATED: 86 MG/DL
LYMPHOCYTES # BLD: 36.3 %
LYMPHOCYTES ABSOLUTE: 2 THOU/MM3 (ref 1–4.8)
MCH RBC QN AUTO: 28.4 PG (ref 26–33)
MCHC RBC AUTO-ENTMCNC: 31.4 GM/DL (ref 32.2–35.5)
MCV RBC AUTO: 90.7 FL (ref 81–99)
MONOCYTES # BLD: 7.1 %
MONOCYTES ABSOLUTE: 0.4 THOU/MM3 (ref 0.4–1.3)
NUCLEATED RED BLOOD CELLS: 0 /100 WBC
PLATELET # BLD: 339 THOU/MM3 (ref 130–400)
PMV BLD AUTO: 11.5 FL (ref 9.4–12.4)
POTASSIUM SERPL-SCNC: 4.3 MEQ/L (ref 3.5–5.2)
RBC # BLD: 4.64 MILL/MM3 (ref 4.2–5.4)
SEG NEUTROPHILS: 53.3 %
SEGMENTED NEUTROPHILS ABSOLUTE COUNT: 2.9 THOU/MM3 (ref 1.8–7.7)
SODIUM BLD-SCNC: 137 MEQ/L (ref 135–145)
TOTAL PROTEIN: 8.3 G/DL (ref 6.1–8)
TRIGL SERPL-MCNC: 98 MG/DL (ref 0–199)
TSH SERPL DL<=0.05 MIU/L-ACNC: 1.79 UIU/ML (ref 0.4–4.2)
WBC # BLD: 5.5 THOU/MM3 (ref 4.8–10.8)

## 2018-12-18 PROCEDURE — 85025 COMPLETE CBC W/AUTO DIFF WBC: CPT

## 2018-12-18 PROCEDURE — 84443 ASSAY THYROID STIM HORMONE: CPT

## 2018-12-18 PROCEDURE — 36415 COLL VENOUS BLD VENIPUNCTURE: CPT

## 2018-12-18 PROCEDURE — 80053 COMPREHEN METABOLIC PANEL: CPT

## 2018-12-18 PROCEDURE — 83036 HEMOGLOBIN GLYCOSYLATED A1C: CPT

## 2018-12-18 PROCEDURE — 80061 LIPID PANEL: CPT

## 2018-12-18 PROCEDURE — 74018 RADEX ABDOMEN 1 VIEW: CPT

## 2018-12-27 ENCOUNTER — HOSPITAL ENCOUNTER (OUTPATIENT)
Dept: GENERAL RADIOLOGY | Age: 28
Discharge: HOME | End: 2018-12-27
Payer: COMMERCIAL

## 2018-12-27 DIAGNOSIS — R14.0 ABDOMINAL DISTENSION: ICD-10-CM

## 2018-12-27 DIAGNOSIS — Z87.19 HX OF INTESTINAL OBSTRUCTION: ICD-10-CM

## 2018-12-27 DIAGNOSIS — K56.0 PARALYTIC ILEUS (HCC): ICD-10-CM

## 2018-12-27 PROCEDURE — 2500000003 HC RX 250 WO HCPCS: Performed by: NURSE PRACTITIONER

## 2018-12-27 PROCEDURE — 74250 X-RAY XM SM INT 1CNTRST STD: CPT

## 2018-12-27 RX ADMIN — BARIUM SULFATE 600 ML: 240 SUSPENSION ORAL at 08:50

## 2018-12-28 ENCOUNTER — TELEPHONE (OUTPATIENT)
Dept: CARDIOLOGY CLINIC | Age: 28
End: 2018-12-28

## 2019-03-27 ENCOUNTER — HOSPITAL ENCOUNTER (OUTPATIENT)
Age: 29
Discharge: HOME | End: 2019-03-27
Payer: COMMERCIAL

## 2019-03-27 ENCOUNTER — HOSPITAL ENCOUNTER (OUTPATIENT)
Dept: GENERAL RADIOLOGY | Age: 29
Discharge: HOME | End: 2019-03-27
Payer: COMMERCIAL

## 2019-03-27 DIAGNOSIS — Z87.19 HISTORY OF ILEUS: ICD-10-CM

## 2019-03-27 DIAGNOSIS — Z87.19 HX OF SMALL BOWEL OBSTRUCTION: ICD-10-CM

## 2019-03-27 LAB
ALBUMIN SERPL-MCNC: 4.3 G/DL (ref 3.5–5.1)
ALP BLD-CCNC: 90 U/L (ref 38–126)
ALT SERPL-CCNC: 11 U/L (ref 11–66)
ANION GAP SERPL CALCULATED.3IONS-SCNC: 16 MEQ/L (ref 8–16)
AST SERPL-CCNC: 20 U/L (ref 5–40)
AVERAGE GLUCOSE: 123 MG/DL (ref 70–126)
BASOPHILS # BLD: 0.7 %
BASOPHILS ABSOLUTE: 0 THOU/MM3 (ref 0–0.1)
BILIRUB SERPL-MCNC: 0.5 MG/DL (ref 0.3–1.2)
BUN BLDV-MCNC: 7 MG/DL (ref 7–22)
CALCIUM SERPL-MCNC: 9.4 MG/DL (ref 8.5–10.5)
CHLORIDE BLD-SCNC: 99 MEQ/L (ref 98–111)
CHOLESTEROL, TOTAL: 162 MG/DL (ref 100–199)
CO2: 25 MEQ/L (ref 23–33)
CREAT SERPL-MCNC: 0.8 MG/DL (ref 0.4–1.2)
EOSINOPHIL # BLD: 1.6 %
EOSINOPHILS ABSOLUTE: 0.1 THOU/MM3 (ref 0–0.4)
ERYTHROCYTE [DISTWIDTH] IN BLOOD BY AUTOMATED COUNT: 13.5 % (ref 11.5–14.5)
ERYTHROCYTE [DISTWIDTH] IN BLOOD BY AUTOMATED COUNT: 43.8 FL (ref 35–45)
GFR SERPL CREATININE-BSD FRML MDRD: 84 ML/MIN/1.73M2
GLUCOSE BLD-MCNC: 79 MG/DL (ref 70–108)
HBA1C MFR BLD: 6.1 % (ref 4.4–6.4)
HCT VFR BLD CALC: 43.2 % (ref 37–47)
HDLC SERPL-MCNC: 36 MG/DL
HEMOGLOBIN: 13.9 GM/DL (ref 12–16)
IMMATURE GRANS (ABS): 0.01 THOU/MM3 (ref 0–0.07)
IMMATURE GRANULOCYTES: 0.2 %
LDL CHOLESTEROL CALCULATED: 105 MG/DL
LYMPHOCYTES # BLD: 31.5 %
LYMPHOCYTES ABSOLUTE: 1.8 THOU/MM3 (ref 1–4.8)
MCH RBC QN AUTO: 28.8 PG (ref 26–33)
MCHC RBC AUTO-ENTMCNC: 32.2 GM/DL (ref 32.2–35.5)
MCV RBC AUTO: 89.6 FL (ref 81–99)
MONOCYTES # BLD: 7.2 %
MONOCYTES ABSOLUTE: 0.4 THOU/MM3 (ref 0.4–1.3)
NUCLEATED RED BLOOD CELLS: 0 /100 WBC
PLATELET # BLD: 303 THOU/MM3 (ref 130–400)
PMV BLD AUTO: 12 FL (ref 9.4–12.4)
POTASSIUM SERPL-SCNC: 4 MEQ/L (ref 3.5–5.2)
RBC # BLD: 4.82 MILL/MM3 (ref 4.2–5.4)
SEG NEUTROPHILS: 58.8 %
SEGMENTED NEUTROPHILS ABSOLUTE COUNT: 3.4 THOU/MM3 (ref 1.8–7.7)
SODIUM BLD-SCNC: 140 MEQ/L (ref 135–145)
TOTAL PROTEIN: 8.2 G/DL (ref 6.1–8)
TRIGL SERPL-MCNC: 105 MG/DL (ref 0–199)
TSH SERPL DL<=0.05 MIU/L-ACNC: 2.29 UIU/ML (ref 0.4–4.2)
WBC # BLD: 5.7 THOU/MM3 (ref 4.8–10.8)

## 2019-03-27 PROCEDURE — 84443 ASSAY THYROID STIM HORMONE: CPT

## 2019-03-27 PROCEDURE — 80053 COMPREHEN METABOLIC PANEL: CPT

## 2019-03-27 PROCEDURE — 80061 LIPID PANEL: CPT

## 2019-03-27 PROCEDURE — 83036 HEMOGLOBIN GLYCOSYLATED A1C: CPT

## 2019-03-27 PROCEDURE — 74019 RADEX ABDOMEN 2 VIEWS: CPT

## 2019-03-27 PROCEDURE — 85025 COMPLETE CBC W/AUTO DIFF WBC: CPT

## 2019-03-27 PROCEDURE — 36415 COLL VENOUS BLD VENIPUNCTURE: CPT

## 2019-04-25 ENCOUNTER — HOSPITAL ENCOUNTER (OUTPATIENT)
Age: 29
Setting detail: OUTPATIENT SURGERY
Discharge: HOME | End: 2019-04-25
Attending: INTERNAL MEDICINE | Admitting: INTERNAL MEDICINE
Payer: COMMERCIAL

## 2019-04-25 ENCOUNTER — ANESTHESIA EVENT (OUTPATIENT)
Dept: ENDOSCOPY | Age: 29
End: 2019-04-25
Payer: COMMERCIAL

## 2019-04-25 ENCOUNTER — ANESTHESIA (OUTPATIENT)
Dept: ENDOSCOPY | Age: 29
End: 2019-04-25
Payer: COMMERCIAL

## 2019-04-25 VITALS
RESPIRATION RATE: 12 BRPM | DIASTOLIC BLOOD PRESSURE: 55 MMHG | TEMPERATURE: 97.6 F | BODY MASS INDEX: 30.39 KG/M2 | HEART RATE: 74 BPM | OXYGEN SATURATION: 100 % | HEIGHT: 65 IN | WEIGHT: 182.4 LBS | SYSTOLIC BLOOD PRESSURE: 102 MMHG

## 2019-04-25 VITALS
DIASTOLIC BLOOD PRESSURE: 56 MMHG | OXYGEN SATURATION: 100 % | SYSTOLIC BLOOD PRESSURE: 106 MMHG | RESPIRATION RATE: 13 BRPM

## 2019-04-25 PROCEDURE — 6370000000 HC RX 637 (ALT 250 FOR IP)

## 2019-04-25 PROCEDURE — 2709999900 HC NON-CHARGEABLE SUPPLY: Performed by: INTERNAL MEDICINE

## 2019-04-25 PROCEDURE — 7100000001 HC PACU RECOVERY - ADDTL 15 MIN: Performed by: INTERNAL MEDICINE

## 2019-04-25 PROCEDURE — 6360000002 HC RX W HCPCS: Performed by: REGISTERED NURSE

## 2019-04-25 PROCEDURE — 2580000003 HC RX 258: Performed by: INTERNAL MEDICINE

## 2019-04-25 PROCEDURE — 7100000000 HC PACU RECOVERY - FIRST 15 MIN: Performed by: INTERNAL MEDICINE

## 2019-04-25 PROCEDURE — 3700000000 HC ANESTHESIA ATTENDED CARE: Performed by: INTERNAL MEDICINE

## 2019-04-25 PROCEDURE — 6360000002 HC RX W HCPCS

## 2019-04-25 PROCEDURE — 3700000001 HC ADD 15 MINUTES (ANESTHESIA): Performed by: INTERNAL MEDICINE

## 2019-04-25 PROCEDURE — 3609027000 HC COLONOSCOPY: Performed by: INTERNAL MEDICINE

## 2019-04-25 PROCEDURE — 6360000002 HC RX W HCPCS: Performed by: INTERNAL MEDICINE

## 2019-04-25 RX ORDER — SODIUM CHLORIDE 450 MG/100ML
INJECTION, SOLUTION INTRAVENOUS CONTINUOUS
Status: DISCONTINUED | OUTPATIENT
Start: 2019-04-25 | End: 2019-04-25 | Stop reason: HOSPADM

## 2019-04-25 RX ORDER — FENTANYL CITRATE 50 UG/ML
INJECTION, SOLUTION INTRAMUSCULAR; INTRAVENOUS PRN
Status: DISCONTINUED | OUTPATIENT
Start: 2019-04-25 | End: 2019-04-25 | Stop reason: SDUPTHER

## 2019-04-25 RX ORDER — ONDANSETRON 2 MG/ML
INJECTION INTRAMUSCULAR; INTRAVENOUS PRN
Status: DISCONTINUED | OUTPATIENT
Start: 2019-04-25 | End: 2019-04-25 | Stop reason: ALTCHOICE

## 2019-04-25 RX ORDER — PROPOFOL 10 MG/ML
INJECTION, EMULSION INTRAVENOUS PRN
Status: DISCONTINUED | OUTPATIENT
Start: 2019-04-25 | End: 2019-04-25 | Stop reason: SDUPTHER

## 2019-04-25 RX ADMIN — ONDANSETRON HYDROCHLORIDE 4 MG: 4 INJECTION, SOLUTION INTRAMUSCULAR; INTRAVENOUS at 10:18

## 2019-04-25 RX ADMIN — SODIUM CHLORIDE: 4.5 INJECTION, SOLUTION INTRAVENOUS at 08:48

## 2019-04-25 RX ADMIN — PROPOFOL 200 MG: 10 INJECTION, EMULSION INTRAVENOUS at 10:16

## 2019-04-25 RX ADMIN — FENTANYL CITRATE 100 MCG: 50 INJECTION INTRAMUSCULAR; INTRAVENOUS at 10:16

## 2019-04-25 ASSESSMENT — PAIN - FUNCTIONAL ASSESSMENT: PAIN_FUNCTIONAL_ASSESSMENT: 0-10

## 2019-04-25 ASSESSMENT — COPD QUESTIONNAIRES: CAT_SEVERITY: MODERATE

## 2019-04-25 ASSESSMENT — ENCOUNTER SYMPTOMS: SHORTNESS OF BREATH: 1

## 2019-04-25 NOTE — H&P
[address] [address]  Sedation/Analgesia History & Physical    Patient: Cherie Buff: 1990  [facility] Rec#: [id number] Acc#: [id number]   Provider Performing Procedure: Aleida Kern  Primary Care Physician: KAYLI Oconnell CNP    PRE-PROCEDURE   Full CODE [x]Yes  DNR-CCA/DNR-CC []Yes   Brief History/Pre-Procedure Diagnosis:change in bowel habit, bleeding and history of constipation           MEDICAL HISTORY  []CAD/Valve  []Liver Disease  []Lung Disease []Diabetes  []Hypertension []Renal Disease  []Additional information:       has a past medical history of Anemia, Asthma, Cerebral palsy ([facility] [address] COPD (chronic obstructive pulmonary disease) ([facility] [address] GERD (gastroesophageal reflux disease), Heart attack ([facility] [address] Mental retardation, and Schizophrenia simplex ([facility] [address] SURGICAL HISTORY   has a past surgical history that includes Gastric fundoplication; laparotomy (3/25/16); Colon surgery; Abdominal exploration surgery (5/27/16); Eye surgery (1996); and Colonoscopy. Additional information:       ALLERGIES   Allergies as of 04/03/2019    (No Known Allergies)     Additional information:       MEDICATIONS   Coumadin Use Last 7 Days [x]No []Yes  Antiplatelet drug therapy use last 7 days  [x]No []Yes  Other anticoagulant use last 7 days  [x]No []Yes    Current Facility-Administered Medications:     0.45 % sodium chloride infusion, , Intravenous, Continuous, Aleida Kern MD, Last Rate: 75 mL/hr at 04/25/19 0848  Prior to Admission medications    Medication Sig Start Date End Date Taking? Authorizing Provider   polyethylene glycol (GLYCOLAX) powder Dispense 238 Gram Bottle.   Use as Directed 4/3/19  Yes KAYLI Dinero CNP   metoprolol tartrate (LOPRESSOR) 25 MG tablet take 1 tablet by mouth twice a day 3/25/19  Yes Felipe Mahoney PA-C   budesonide (PULMICORT) 0.5 MG/2ML nebulizer suspension inhale contents of 1 vial in nebulizer twice a day 11/6/18  Yes KAYLI Williamson CNP albuterol (PROVENTIL) (2.5 MG/3ML) 0.083% nebulizer solution inhale contents of 1 vial in nebulizer every 6 hours if needed FOR WHEEZING OR SHORTNESS OF BREATH 11/6/18  Yes KAYLI Rogers CNP   psyllium (METAMUCIL) 0.52 g capsule Take 1.04 g by mouth daily   Yes Historical Provider, MD   bisacodyl (DULCOLAX) 5 MG EC tablet Take 2 tablets by mouth daily as needed for Constipation 8/30/18  Yes KAYLI Alegre CNP   midodrine (PROAMATINE) 5 MG tablet take 1 tablet by mouth twice a day 3/29/18  Yes Michelle Kim MD   minocycline (MINOCIN;DYNACIN) 100 MG capsule Take 100 mg by mouth 2 times daily   Yes Historical Provider, MD   Cetirizine HCl (ZYRTEC ALLERGY CHILDRENS) 10 MG TBDP Take 10 mg by mouth daily 8/30/16  Yes Quinn Briscoe MD   QUEtiapine (SEROQUEL) 200 MG tablet Take 200 mg by mouth nightly    Yes Historical Provider, MD   Sertraline HCl (ZOLOFT PO) Take 100 mg by mouth every morning    Yes Historical Provider, MD   ALPRAZolam (XANAX PO) Take 1 mg by mouth 3 times daily as needed.    Yes Historical Provider, MD   bisacodyl (DULCOLAX) 5 MG EC tablet See Prep Instructions 4/3/19   KAYLI Alegre CNP   etonogestrel (NEXPLANON) 68 MG implant 68 mg by Subdermal route once    Historical Provider, MD   omeprazole (PRILOSEC) 20 MG delayed release capsule Take 1 capsule by mouth every morning (before breakfast) 8/30/18 10/10/18  KAYLI Alegre CNP     Additional information:       PHYSICAL:   Heart:  [x]Regular rate and rhythm  []Other:    Lungs:  [x]Clear    []Other:    Abdomen: [x]Soft    []Other:    Mental Status: [x]Alert & Oriented  []Other:      VITAL SIGNS   Patient Vitals for the past 24 hrs:   BP Temp Temp src Pulse Resp SpO2 Height Weight   04/25/19 0839 121/69 97.2 °F (36.2 °C) Temporal 75 20 100 % 5' 5\" (1.651 m) 182 lb 6.4 oz (82.7 kg)       PLANNED PROCEDURE   []EGD  [x]Colonoscopy []Flex Sigmoid  []ERCP []EUS   []Cystoscopy  [] CATH [] BRONCH   Consent: I have discussed with the patient and/or the patient representative the indication, alternatives, and the possible risks and/or complications of the planned procedure and the anesthesia methods. The patient and/or patient representative appear to understand and agree to proceed. SEDATION  Planned agent:[x]Midazolam []Meperidine [x]Sublimaze []Morphine  []Diazepam  [] as per anesthesia   ASA Classification: Class 3 - A patient with severe systemic disease that limits activity but is not incapacitating    Airway Assessment: Mallampati Class II - (soft palate, fauces & uvula are visible)    Monitoring and Safety: The patient will be placed on a cardiac monitor and vital signs, pulse oximetry and level of consciousness will be continuously evaluated throughout the procedure. The patient will be closely monitored until recovery from the medications is complete and the patient has returned to baseline status. Respiratory therapy will be on standby during the procedure. [x]Pre-procedure diagnostic studies complete and results available. Comment:    [x]Previous sedation/anesthesia experiences assessed. Comment:    [x]The patient is an appropriate candidate to undergo the planned procedure sedation and anesthesia. (Refer to nursing sedation/analgesia documentation record)  [x]Formulation and discussion of sedation/procedure plan, risks, and expectations with patient and/or responsible adult completed. [x]Patient examined immediately prior to the procedure.  (Refer to nursing sedation/analgesia documentation record)    Sven Montero MD   Electronically signed 4/25/2019 at 10:11 AM

## 2019-04-25 NOTE — OP NOTE
[address] [place] [address]                                OPERATIVE REPORT    PATIENT NAME: Natanael Garrison                  :        1990  MED REC NO:   [id number]                           ROOM:  ACCOUNT NO:   [de-identified]                           ADMIT DATE: 2019  PROVIDER:     Charlene Ohara M.D.    DATE OF PROCEDURE:  2019    INDICATION:  The patient with change in bowel habit, partial bowel  obstruction, distention of the abdomen, change in bowel habits as well  as lower GI bleed. Plan today for colonoscopy to evaluate. SURGEON:  Charlene Ohara MD    ASA CLASSIFICATION:  III    DESCRIPTION OF PROCEDURE:  The patient was brought to the GI lab. Consent was obtained. Risks involved with the procedure were explained  to the patient. Informed consent was obtained. The patient was  monitored during the procedure with pulse oximetry, blood pressure  monitoring, and oxygen by nasal cannula. Sedation by incremental doses  of IV propofol given by the anesthesia service to achieve total  anesthesia. For ASA classification and medications given during the  procedure, please see Anesthesia notes. PROCEDURE PERFORMED:  Colonoscopy. Digital examination revealed normal  rectum. Standard colonoscope was advanced under direct vision from the  rectum up to the cecum. Prep was good, and the patient tolerated the  procedure well. Cecum intubation confirmed by appendiceal orifice. Scope was withdrawn. No polyps, no masses, no diverticulitis, no  colitis. Everything appears normal.  Scope was withdrawn with no  immediate complications. IMPRESSION:  1. Normal colonoscopy. 2.  No evidence of partial large bowel obstruction seen. 3.  Consider small bowel follow-through and evaluation to rule out  partial bowel obstruction.         Brandie Rodriguez M.D.    D: 2019 10:52:59       T: 2019 12:19:13 [id number]  Job#: [id number]     Doc#: [id number]    CC:  Caesar Bull Np

## 2019-04-25 NOTE — ANESTHESIA PRE PROCEDURE
Department of Anesthesiology  Preprocedure Note       Name:  Kassie Casey   Age:  26 y.o.  :  1990                                          MRN:  [id number]         Date:  2019      Surgeon: Alma Yanes):  Sparkle Strong MD    Procedure: COLONOSCOPY (N/A )    Medications prior to admission:   Prior to Admission medications    Medication Sig Start Date End Date Taking? Authorizing Provider   polyethylene glycol (GLYCOLAX) powder Dispense 238 Gram Bottle. Use as Directed 4/3/19  Yes KAYLI Warren CNP   metoprolol tartrate (LOPRESSOR) 25 MG tablet take 1 tablet by mouth twice a day 3/25/19  Yes Felipe Mahoney PA-C   budesonide (PULMICORT) 0.5 MG/2ML nebulizer suspension inhale contents of 1 vial in nebulizer twice a day 18  Yes KAYLI Rogers CNP   albuterol (PROVENTIL) (2.5 MG/3ML) 0.083% nebulizer solution inhale contents of 1 vial in nebulizer every 6 hours if needed FOR WHEEZING OR SHORTNESS OF BREATH 18  Yes KAYLI Rogers CNP   psyllium (METAMUCIL) 0.52 g capsule Take 1.04 g by mouth daily   Yes Historical Provider, MD   bisacodyl (DULCOLAX) 5 MG EC tablet Take 2 tablets by mouth daily as needed for Constipation 18  Yes KAYLI Warren CNP   midodrine (PROAMATINE) 5 MG tablet take 1 tablet by mouth twice a day 3/29/18  Yes Anna Arrington MD   minocycline (MINOCIN;DYNACIN) 100 MG capsule Take 100 mg by mouth 2 times daily   Yes Historical Provider, MD   Cetirizine HCl (ZYRTEC ALLERGY CHILDRENS) 10 MG TBDP Take 10 mg by mouth daily 16  Yes Sparkle Strong MD   QUEtiapine (SEROQUEL) 200 MG tablet Take 200 mg by mouth nightly    Yes Historical Provider, MD   Sertraline HCl (ZOLOFT PO) Take 100 mg by mouth every morning    Yes Historical Provider, MD   ALPRAZolam (XANAX PO) Take 1 mg by mouth 3 times daily as needed.    Yes Historical Provider, MD   bisacodyl (DULCOLAX) 5 MG EC tablet See Prep Instructions 4/3/19   Daron Gill, APRN - CNP [employer] [facility] pt has an open gap for Depression Screening for 2018  Also, pt has refused weight in the past, however she has an open gap in care for BMI assessment  She will need a BP check appt soon; she received Rx refills for her BP medication in March for 3 months of medication  Please outreach to schedule a BP check appt and route this message to MarialuisaCristobal Dean when appt is made  Thank you  etonogestrel (NEXPLANON) 68 MG implant 68 mg by Subdermal route once    Historical Provider, MD   omeprazole (PRILOSEC) 20 MG delayed release capsule Take 1 capsule by mouth every morning (before breakfast) 8/30/18 10/10/18  KAYLI Walton - CNP       Current medications:    Current Facility-Administered Medications   Medication Dose Route Frequency Provider Last Rate Last Dose    0.45 % sodium chloride infusion   Intravenous Continuous Alexey Segovia MD 75 mL/hr at 04/25/19 0848      ondansetron (Huyen Rideau) injection    PRN Alexey Segovia MD   4 mg at 04/25/19 1018       Allergies:  No Known Allergies    Problem List:    Patient Active Problem List   Diagnosis Code    SBO (small bowel obstruction) (HCC) K56.609    Small bowel obstruction (HCC) K56.609    Acute hypercapnic respiratory failure (HCC) J96.02    Acute pulmonary edema (HCC) J81.0    Abnormal chest x-ray R93.89    Acute respiratory failure with hypoxia and hypercapnia (HCC) J96.01, J96.02    Dyspnea and respiratory abnormalities R06.00, R06.89    Chronic obstructive pulmonary disease (HCC) J44.9    S/P exploratory laparotomy Z98.890    Collapse of lung J98.19    Cerebral palsy (HCC) G80.9    Acute-on-chronic respiratory failure ([address] J96.20    Heart attack ([address] I21.9    Generalized abdominal pain R10.84    Hx of MI < 8 weeks [id number]    S/P laparotomy Z98.890    Chronic respiratory failure with hypoxia (HCC) J96.11    Acute blood loss anemia D62    Non Q wave myocardial infarction ([address]  work up planned when clinically stable  I21.4    Large bowel obstruction (HCC) K56.609    Microcytic hypochromic anemia D50.9    Postural hypotension I95.1    Tachycardia R00.0    Moderate asthma without complication H20.302    Bronchopulmonary dysplasia P27.1    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       Past Medical History:        Diagnosis Date    Anemia     Asthma     Cerebral palsy (HCC)     [facility]) [facility]     GERD (gastroesophageal reflux disease)     Heart attack ([facility] 3/2016    Mental retardation     Schizophrenia simplex ([facility]        Past Surgical History:        Procedure Laterality Date    ABDOMINAL EXPLORATION SURGERY  5/27/16    lysis of adhesions    COLON SURGERY      COLONOSCOPY      EYE SURGERY  1996    GASTRIC FUNDOPLICATION      LAPAROTOMY  3/25/16    exploratory, lysis of adhesions, reduction of internal hernia       Social History:    Social History     Tobacco Use    Smoking status: Passive Smoke Exposure - Never Smoker    Smokeless tobacco: Never Used   Substance Use Topics    Alcohol use: No                                Counseling given: Not Answered      Vital Signs (Current):   Vitals:    04/25/19 0839   BP: 121/69   Pulse: 75   Resp: 20   Temp: 36.2 °C (97.2 °F)   TempSrc: Temporal   SpO2: 100%   Weight: 182 lb 6.4 oz (82.7 kg)   Height: 5' 5\" (1.651 m)                                              BP Readings from Last 3 Encounters:   04/25/19 121/69   04/25/19 (!) 106/56   04/03/19 109/69       NPO Status: Time of last liquid consumption: 2200                        Time of last solid consumption: 2100                        Date of last liquid consumption: 04/24/19                        Date of last solid food consumption: 04/23/19    BMI:   Wt Readings from Last 3 Encounters:   04/25/19 182 lb 6.4 oz (82.7 kg)   04/03/19 172 lb 9.6 oz (78.3 kg)   01/03/19 179 lb (81.2 kg)     Body mass index is 30.35 kg/m².     CBC:   Lab Results   Component Value Date    WBC 5.7 03/27/2019    RBC 4.82 03/27/2019    HGB 13.9 03/27/2019    HCT 43.2 03/27/2019    MCV 89.6 03/27/2019    RDW 19.0 02/20/2017     03/27/2019       CMP:   Lab Results   Component Value Date     03/27/2019    K 4.0 03/27/2019    CL 99 03/27/2019    CO2 25 03/27/2019    BUN 7 03/27/2019    CREATININE 0.8 03/27/2019    LABGLOM 84 03/27/2019    GLUCOSE 79 03/27/2019    PROT 8.2 03/27/2019    CALCIUM 9.4 03/27/2019    BILITOT 0.5 03/27/2019    ALKPHOS 90 03/27/2019    AST 20 03/27/2019    ALT 11 03/27/2019       POC Tests: No results for input(s): POCGLU, POCNA, POCK, POCCL, POCBUN, POCHEMO, POCHCT in the last 72 hours. Coags:   Lab Results   Component Value Date    INR 1.15 02/08/2017    APTT 31.2 05/27/2016       HCG (If Applicable):   Lab Results   Component Value Date    PREGTESTUR NEGATIVE 02/20/2017    PREGSERUM NEGATIVE 02/07/2017        ABGs: No results found for: PHART, PO2ART, AXM2JWN, CCF2UIP, BEART, A1ACGKEV     Type & Screen (If Applicable):  Lab Results   Component Value Date    [facility] POS 05/27/2016       Anesthesia Evaluation  Patient summary reviewed and Nursing notes reviewed no history of anesthetic complications:   Airway: Mallampati: II  TM distance: >3 FB   Neck ROM: full  Mouth opening: > = 3 FB Dental: normal exam         Pulmonary:normal exam    (+) COPD: moderate,  shortness of breath:  asthma: exercise-induced asthma,                            Cardiovascular:    (+) past MI:,       ECG reviewed      Echocardiogram reviewed                  Neuro/Psych:   (+) neuromuscular disease:, psychiatric history: stable with treatment            GI/Hepatic/Renal:   (+) GERD: well controlled, bowel prep,           Endo/Other:                     Abdominal:   (+) scaphoid        Vascular:                                        Anesthesia Plan      ASA 3             Anesthetic plan and risks discussed with patient. Plan discussed with CRNA and attending.                   Karen Leyva, KAYLI - CRNA   4/25/2019

## 2019-04-25 NOTE — BRIEF OP NOTE
Brief Postoperative Note  ______________________________________________________________    Patient: Peter Homans  YOB: 1990  MRN: [id number]  Date of Procedure: 4/25/2019    Pre-Op Diagnosis: INTERMITTENT ABDOMINAL PAIN, INTERMITTENT CONSTIPATION, ABDOMINAL DISTENSION, HX OF INTERSTINAL OBSTRUCTION    Post-Op Diagnosis: normal colonoscopy        Procedure(s):  COLONOSCOPY    Anesthesia: Anesthesia type not filed in the log. Surgeon(s):  Riccardo Rucker MD    Assistant: Grace Cardozo     Estimated Blood Loss (mL): none    Complications: None    Specimens:   * No specimens in log *    Implants:  * No implants in log *      Drains: * No LDAs found *    Findings:  Normal colonoscopy .      Riccardo Rucker MD  Date: 4/25/2019  Time: 10:38 AM

## 2019-04-26 ASSESSMENT — COPD QUESTIONNAIRES: CAT_SEVERITY: MILD

## 2019-04-26 ASSESSMENT — ENCOUNTER SYMPTOMS: SHORTNESS OF BREATH: 1

## 2019-04-26 NOTE — ANESTHESIA PRE PROCEDURE
Department of Anesthesiology  Preprocedure Note       Name:  David Duran   Age:  26 y.o.  :  1990                                          MRN:  [id number]         Date:  2019      Surgeon: Kaya Bardales):  Violeta Rojo MD    Procedure: COLONOSCOPY (N/A )    Medications prior to admission:   Prior to Admission medications    Medication Sig Start Date End Date Taking? Authorizing Provider   polyethylene glycol (GLYCOLAX) powder Dispense 238 Gram Bottle. Use as Directed 4/3/19  Yes Mac Nas, APRN - MIGUELITO   metoprolol tartrate (LOPRESSOR) 25 MG tablet take 1 tablet by mouth twice a day 3/25/19  Yes Felipe Mahoney PA-C   budesonide (PULMICORT) 0.5 MG/2ML nebulizer suspension inhale contents of 1 vial in nebulizer twice a day 18  Yes KAYLI Rogers CNP   albuterol (PROVENTIL) (2.5 MG/3ML) 0.083% nebulizer solution inhale contents of 1 vial in nebulizer every 6 hours if needed FOR WHEEZING OR SHORTNESS OF BREATH 18  Yes KAYLI Rogers CNP   psyllium (METAMUCIL) 0.52 g capsule Take 1.04 g by mouth daily   Yes Historical Provider, MD   bisacodyl (DULCOLAX) 5 MG EC tablet Take 2 tablets by mouth daily as needed for Constipation 18  Yes Mac Nas, KAYLI Hagen CNP   midodrine (PROAMATINE) 5 MG tablet take 1 tablet by mouth twice a day 3/29/18  Yes Maikel Marte MD   minocycline (MINOCIN;DYNACIN) 100 MG capsule Take 100 mg by mouth 2 times daily   Yes Historical Provider, MD   Cetirizine HCl (ZYRTEC ALLERGY CHILDRENS) 10 MG TBDP Take 10 mg by mouth daily 16  Yes Violeta Rojo MD   QUEtiapine (SEROQUEL) 200 MG tablet Take 200 mg by mouth nightly    Yes Historical Provider, MD   Sertraline HCl (ZOLOFT PO) Take 100 mg by mouth every morning    Yes Historical Provider, MD   ALPRAZolam (XANAX PO) Take 1 mg by mouth 3 times daily as needed.    Yes Historical Provider, MD   bisacodyl (DULCOLAX) 5 MG EC tablet See Prep Instructions 4/3/19   Mac Arm, APRN - CNP etonogestrel (NEXPLANON) 68 MG implant 68 mg by Subdermal route once    Historical Provider, MD   omeprazole (PRILOSEC) 20 MG delayed release capsule Take 1 capsule by mouth every morning (before breakfast) 8/30/18 10/10/18  KAYLI Mckoy - CNP       Current medications:    No current facility-administered medications for this encounter. Current Outpatient Medications   Medication Sig Dispense Refill    polyethylene glycol (GLYCOLAX) powder Dispense 238 Gram Bottle. Use as Directed 238 g 0    metoprolol tartrate (LOPRESSOR) 25 MG tablet take 1 tablet by mouth twice a day 180 tablet 3    budesonide (PULMICORT) 0.5 MG/2ML nebulizer suspension inhale contents of 1 vial in nebulizer twice a day 120 mL 11    albuterol (PROVENTIL) (2.5 MG/3ML) 0.083% nebulizer solution inhale contents of 1 vial in nebulizer every 6 hours if needed FOR WHEEZING OR SHORTNESS OF BREATH 375 mL 11    psyllium (METAMUCIL) 0.52 g capsule Take 1.04 g by mouth daily      bisacodyl (DULCOLAX) 5 MG EC tablet Take 2 tablets by mouth daily as needed for Constipation 60 tablet 3    midodrine (PROAMATINE) 5 MG tablet take 1 tablet by mouth twice a day 180 tablet 3    minocycline (MINOCIN;DYNACIN) 100 MG capsule Take 100 mg by mouth 2 times daily      Cetirizine HCl (ZYRTEC ALLERGY CHILDRENS) 10 MG TBDP Take 10 mg by mouth daily 15 tablet 0    QUEtiapine (SEROQUEL) 200 MG tablet Take 200 mg by mouth nightly       Sertraline HCl (ZOLOFT PO) Take 100 mg by mouth every morning       ALPRAZolam (XANAX PO) Take 1 mg by mouth 3 times daily as needed.       bisacodyl (DULCOLAX) 5 MG EC tablet See Prep Instructions 4 tablet 0    etonogestrel (NEXPLANON) 68 MG implant 68 mg by Subdermal route once      omeprazole (PRILOSEC) 20 MG delayed release capsule Take 1 capsule by mouth every morning (before breakfast) 30 capsule 6       Allergies:  No Known Allergies    Problem List:    Patient Active Problem List   Diagnosis Code    SBO (small bowel obstruction) (HCC) K56.609    Small bowel obstruction (HCC) K56.609    Acute hypercapnic respiratory failure (HCC) J96.02    Acute pulmonary edema (HCC) J81.0    Abnormal chest x-ray R93.89    Acute respiratory failure with hypoxia and hypercapnia (HCC) J96.01, J96.02    Dyspnea and respiratory abnormalities R06.00, R06.89    Chronic obstructive pulmonary disease (HCC) J44.9    S/P exploratory laparotomy Z98.890    Collapse of lung J98.19    Cerebral palsy (HCC) G80.9    Acute-on-chronic respiratory failure (HCC) J96.20    Heart attack (HCC) I21.9    Generalized abdominal pain R10.84    Hx of MI < 8 weeks [id number]    S/P laparotomy Z98.890    Chronic respiratory failure with hypoxia (HCC) J96.11    Acute blood loss anemia D62    Non Q wave myocardial infarction (HCC)  work up planned when clinically stable  I21.4    Large bowel obstruction (HCC) K56.609    Microcytic hypochromic anemia D50.9    Postural hypotension I95.1    Tachycardia R00.0    Moderate asthma without complication A32.016    Bronchopulmonary dysplasia P27.1    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       Past Medical History:        Diagnosis Date    Anemia     Asthma     Cerebral palsy (HCC)     COPD (chronic obstructive pulmonary disease) (HCC)     GERD (gastroesophageal reflux disease)     [facility] 3/2016    Mental retardation     Schizophrenia simplex ([facility]        Past Surgical History:        Procedure Laterality Date    ABDOMINAL EXPLORATION SURGERY  5/27/16    lysis of adhesions    COLON SURGERY      COLONOSCOPY      COLONOSCOPY N/A 4/25/2019    COLONOSCOPY performed by Irma Quan MD at [address] [place] [address]  3/25/16    exploratory, lysis of adhesions, reduction of internal hernia       Social History:    Social History     Tobacco Use    Smoking status: Passive Smoke Exposure - Never Smoker    Smokeless tobacco: Never Used Substance Use Topics    Alcohol use: No                                Counseling given: Not Answered      Vital Signs (Current):   Vitals:    04/25/19 0839 04/25/19 1040 04/25/19 1045   BP: 121/69 108/69 (!) 102/55   Pulse: 75 77 74   Resp: 20 12    Temp: 97.2 °F (36.2 °C) 97.6 °F (36.4 °C)    TempSrc: Temporal Temporal    SpO2: 100% 100% 100%   Weight: 182 lb 6.4 oz (82.7 kg)     Height: 5' 5\" (1.651 m)                                                BP Readings from Last 3 Encounters:   04/25/19 (!) 102/55   04/25/19 (!) 106/56   04/03/19 109/69       NPO Status: Time of last liquid consumption: 2200                        Time of last solid consumption: 2100                        Date of last liquid consumption: 04/24/19                        Date of last solid food consumption: 04/23/19    BMI:   Wt Readings from Last 3 Encounters:   04/25/19 182 lb 6.4 oz (82.7 kg)   04/03/19 172 lb 9.6 oz (78.3 kg)   01/03/19 179 lb (81.2 kg)     Body mass index is 30.35 kg/m². CBC:   Lab Results   Component Value Date    WBC 5.7 03/27/2019    RBC 4.82 03/27/2019    HGB 13.9 03/27/2019    HCT 43.2 03/27/2019    MCV 89.6 03/27/2019    RDW 19.0 02/20/2017     03/27/2019       CMP:   Lab Results   Component Value Date     03/27/2019    K 4.0 03/27/2019    CL 99 03/27/2019    CO2 25 03/27/2019    BUN 7 03/27/2019    CREATININE 0.8 03/27/2019    LABGLOM 84 03/27/2019    GLUCOSE 79 03/27/2019    PROT 8.2 03/27/2019    CALCIUM 9.4 03/27/2019    BILITOT 0.5 03/27/2019    ALKPHOS 90 03/27/2019    AST 20 03/27/2019    ALT 11 03/27/2019       POC Tests: No results for input(s): POCGLU, POCNA, POCK, POCCL, POCBUN, POCHEMO, POCHCT in the last 72 hours.     Coags:   Lab Results   Component Value Date    INR 1.15 02/08/2017    APTT 31.2 05/27/2016       HCG (If Applicable):   Lab Results   Component Value Date    PREGTESTUR NEGATIVE 02/20/2017    PREGSERUM NEGATIVE 02/07/2017        ABGs: No results found for: PHART, PO2ART, MFM5WTB, OAD1WSE, BEART, J2SGDHFM     Type & Screen (If Applicable):  Lab Results   Component Value Date    LABRH POS 05/27/2016       Anesthesia Evaluation    Airway: Mallampati: II  TM distance: >3 FB   Neck ROM: full  Mouth opening: > = 3 FB Dental:          Pulmonary:normal exam  breath sounds clear to auscultation  (+) COPD: mild,  shortness of breath:  asthma:                            Cardiovascular:  Exercise tolerance: good (>4 METS),   (+) past MI: > 6 months,                   Neuro/Psych:   (+) neuromuscular disease:, psychiatric history:            GI/Hepatic/Renal:   (+) GERD:,           Endo/Other: Negative Endo/Other ROS             Pt had no PAT visit       Abdominal:           Vascular: negative vascular ROS. Anesthesia Plan      MAC     ASA 3       Induction: intravenous. Anesthetic plan and risks discussed with patient. Plan discussed with CRNA.                   Chandra Wilson DO   4/26/2019

## 2019-08-01 ENCOUNTER — OFFICE VISIT (OUTPATIENT)
Dept: CARDIOLOGY CLINIC | Age: 29
End: 2019-08-01
Payer: COMMERCIAL

## 2019-08-01 VITALS
BODY MASS INDEX: 27.32 KG/M2 | HEIGHT: 65 IN | HEART RATE: 89 BPM | SYSTOLIC BLOOD PRESSURE: 105 MMHG | DIASTOLIC BLOOD PRESSURE: 75 MMHG | WEIGHT: 164 LBS

## 2019-08-01 DIAGNOSIS — R00.2 HEART PALPITATIONS: Primary | ICD-10-CM

## 2019-08-01 DIAGNOSIS — J45.909 MODERATE ASTHMA WITHOUT COMPLICATION, UNSPECIFIED WHETHER PERSISTENT: ICD-10-CM

## 2019-08-01 DIAGNOSIS — G80.9 CEREBRAL PALSY, UNSPECIFIED TYPE (HCC): ICD-10-CM

## 2019-08-01 DIAGNOSIS — I95.1 POSTURAL HYPOTENSION: ICD-10-CM

## 2019-08-01 PROCEDURE — 93000 ELECTROCARDIOGRAM COMPLETE: CPT | Performed by: NURSE PRACTITIONER

## 2019-08-01 PROCEDURE — 1036F TOBACCO NON-USER: CPT | Performed by: NURSE PRACTITIONER

## 2019-08-01 PROCEDURE — G8417 CALC BMI ABV UP PARAM F/U: HCPCS | Performed by: NURSE PRACTITIONER

## 2019-08-01 PROCEDURE — G8427 DOCREV CUR MEDS BY ELIG CLIN: HCPCS | Performed by: NURSE PRACTITIONER

## 2019-08-01 PROCEDURE — 99213 OFFICE O/P EST LOW 20 MIN: CPT | Performed by: NURSE PRACTITIONER

## 2019-08-01 PROCEDURE — G8599 NO ASA/ANTIPLAT THER USE RNG: HCPCS | Performed by: NURSE PRACTITIONER

## 2019-08-01 RX ORDER — MIDODRINE HYDROCHLORIDE 5 MG/1
TABLET ORAL
Qty: 180 TABLET | Refills: 3 | Status: SHIPPED | OUTPATIENT
Start: 2019-08-01 | End: 2020-09-29 | Stop reason: SDUPTHER

## 2019-08-01 RX ORDER — QUETIAPINE FUMARATE 300 MG/1
400 TABLET, FILM COATED ORAL DAILY
Status: ON HOLD | COMMUNITY
Start: 2016-08-01 | End: 2021-02-22 | Stop reason: HOSPADM

## 2019-08-01 RX ORDER — ALBUTEROL SULFATE 2.5 MG/3ML
3 SOLUTION RESPIRATORY (INHALATION)
COMMUNITY
Start: 2016-07-05 | End: 2019-12-10 | Stop reason: SDUPTHER

## 2019-08-01 NOTE — PROGRESS NOTES
Directed 238 g 0    budesonide (PULMICORT) 0.5 MG/2ML nebulizer suspension inhale contents of 1 vial in nebulizer twice a day 120 mL 11    albuterol (PROVENTIL) (2.5 MG/3ML) 0.083% nebulizer solution inhale contents of 1 vial in nebulizer every 6 hours if needed FOR WHEEZING OR SHORTNESS OF BREATH 375 mL 11    etonogestrel (NEXPLANON) 68 MG implant 68 mg by Subdermal route once      Sertraline HCl (ZOLOFT PO) Take 150 mg by mouth every morning       ALPRAZolam (XANAX PO) Take 1 mg by mouth 3 times daily as needed.  omeprazole (PRILOSEC) 20 MG delayed release capsule Take 1 capsule by mouth every morning (before breakfast) 30 capsule 6     No current facility-administered medications for this visit.         Social History     Socioeconomic History    Marital status: Single     Spouse name: None    Number of children: None    Years of education: None    Highest education level: None   Occupational History    None   Social Needs    Financial resource strain: None    Food insecurity:     Worry: None     Inability: None    Transportation needs:     Medical: None     Non-medical: None   Tobacco Use    Smoking status: Passive Smoke Exposure - Never Smoker    Smokeless tobacco: Never Used   Substance and Sexual Activity    Alcohol use: No    Drug use: No    Sexual activity: Never   Lifestyle    Physical activity:     Days per week: None     Minutes per session: None    Stress: None   Relationships    Social connections:     Talks on phone: None     Gets together: None     Attends [employer] service: None     Active member of club or organization: None     Attends meetings of clubs or organizations: None     Relationship status: None    Intimate partner violence:     Fear of current or ex partner: None     Emotionally abused: None     Physically abused: None     Forced sexual activity: None   Other Topics Concern    None   Social History Narrative    None       Family History   Problem Relation Age of Onset    Diabetes Mother     High Blood Pressure Mother     Colon Cancer Neg Hx     Breast Cancer Neg Hx     Cancer Neg Hx        Blood pressure 105/75, pulse 89, height 5' 5\" (1.651 m), weight 164 lb (74.4 kg), not currently breastfeeding. General:   Well developed, well nourished  Lungs:   Clear to auscultation  Heart:    Normal S1 S2, No murmur, rubs, or gallops  Abdomen:   Soft, non tender, no organomegalies, positive bowel sounds  Extremities:   No edema, no cyanosis, good peripheral pulses  Neurological:   Awake, alert, oriented. No obvious focal deficits, mental slowness  Musculoskeletal:  No obvious deformities    EKG: No acute abnormalities, non-specific T W inversion, unchanged    Holter Monitor: 3/23/18  CONCLUSION:  1. Predominantly sinus rhythm. 2.  Frequent number of premature ventricular beats. 3.  No sustained arrhythmias. 4.  Abnormal Holter monitor. Clinical correlation is recommended.           Jordan Jose M.D.    Echo: 3/29/16  Summary   The left ventricle was not well visualized.   Ejection fraction is visually estimated at 50%.   moderately dilated right ventricle.   Right ventricle global systolic function is mildly reduced .      Signature      ----------------------------------------------------------------   Electronically signed by Efe Lambert DO      Diagnosis Orders   1. Heart palpitations  EKG 12 Lead    ECHO Complete 2D W Doppler W Color    Tilt table test   2. Postural hypotension     3. Cerebral palsy, unspecified type ([address]     4. Moderate asthma without complication, unspecified whether persistent         Orders Placed This Encounter   Procedures    Tilt table test     Standing Status:   Future     Standing Expiration Date:   8/1/2020     Scheduling Instructions:      Dr. Josephine Felipe to read   [facility] EKG 12 Lead     Order Specific Question:   Reason for Exam?     Answer:    Other    ECHO Complete 2D W Doppler W Color     Standing Status:   Future     Standing Expiration Date:   8/1/2020     Scheduling Instructions:      Dr. Carol Shin to read     Order Specific Question:   Reason for exam:     Answer:   palpitations     Tilt table to further evaluate palpitations. Will also check Echo in light of lower EF (50) and right ventricular enlargement.       Discussed with patient and her mother the use, benefit, and side effects of prescribed medications. All patient questions answered. Pt voiced understanding. Instructed to continue current medications, diet and exercise. Continue risk factor modification and medical management. Patient agreed with treatment plan. Follow up as directed.     Continue Dr Ryan Banerjee current treatment plan  Follow up with Dr Carol Shin as scheduled or sooner if needed

## 2019-08-29 ENCOUNTER — HOSPITAL ENCOUNTER (OUTPATIENT)
Dept: NON INVASIVE DIAGNOSTICS | Age: 29
Discharge: HOME | End: 2019-08-29
Payer: COMMERCIAL

## 2019-08-29 DIAGNOSIS — R00.2 HEART PALPITATIONS: ICD-10-CM

## 2019-08-29 LAB
LV EF: 50 %
LVEF MODALITY: NORMAL

## 2019-08-29 PROCEDURE — 93306 TTE W/DOPPLER COMPLETE: CPT

## 2019-08-29 PROCEDURE — 93660 TILT TABLE EVALUATION: CPT | Performed by: NUCLEAR MEDICINE

## 2019-08-29 PROCEDURE — 2709999900 HC NON-CHARGEABLE SUPPLY

## 2019-08-30 NOTE — PROCEDURES
[address] [id number]                                TILT TABLE TEST    PATIENT NAME: Kota Lr                  :        1990  MED REC NO:   [id number]                           ROOM:  ACCOUNT NO:   [de-identified]                           ADMIT DATE: 2019  PROVIDER:     RUFINA Barreto STUDY:  2019    CLINICAL HISTORY AND INDICATION:  This is a patient with dizziness. TILT TABLE TEST DESCRIPTION AND FINDINGS:  Baseline EKG showed sinus  rhythm. Baseline blood pressure was 121/74. Baseline heart rate was 66  beats per minute. The patient was tilted for a total of 30 minutes at a  70-degree angle. Tilt was associated with no significant symptoms. Blood pressure remained stable, fluctuating between 175 to 943 systolic. Heart rate was stable. Test completed successfully. CONCLUSION:  1. Tilt table test associated with no significant symptoms. 2.  No significant hemodynamic changes.   3.  Otherwise, unremarkable tilt table test.        Vanessa Galvez M.D.    D: 2019 14:59:22       T: 2019 15:16:21     [email or web address]  Job#: [id number]     Doc#: [id number]    CC:

## 2019-12-10 ENCOUNTER — OFFICE VISIT (OUTPATIENT)
Dept: PULMONOLOGY | Age: 29
End: 2019-12-10
Payer: COMMERCIAL

## 2019-12-10 VITALS
WEIGHT: 165 LBS | HEART RATE: 98 BPM | BODY MASS INDEX: 27.49 KG/M2 | SYSTOLIC BLOOD PRESSURE: 102 MMHG | DIASTOLIC BLOOD PRESSURE: 68 MMHG | OXYGEN SATURATION: 96 % | HEIGHT: 65 IN | TEMPERATURE: 97.3 F

## 2019-12-10 DIAGNOSIS — J98.6 PARALYSIS OF DIAPHRAGM: ICD-10-CM

## 2019-12-10 DIAGNOSIS — J45.909 MODERATE ASTHMA WITHOUT COMPLICATION, UNSPECIFIED WHETHER PERSISTENT: ICD-10-CM

## 2019-12-10 PROCEDURE — G8484 FLU IMMUNIZE NO ADMIN: HCPCS | Performed by: NURSE PRACTITIONER

## 2019-12-10 PROCEDURE — G8417 CALC BMI ABV UP PARAM F/U: HCPCS | Performed by: NURSE PRACTITIONER

## 2019-12-10 PROCEDURE — G8599 NO ASA/ANTIPLAT THER USE RNG: HCPCS | Performed by: NURSE PRACTITIONER

## 2019-12-10 PROCEDURE — 1036F TOBACCO NON-USER: CPT | Performed by: NURSE PRACTITIONER

## 2019-12-10 PROCEDURE — 99214 OFFICE O/P EST MOD 30 MIN: CPT | Performed by: NURSE PRACTITIONER

## 2019-12-10 PROCEDURE — G8427 DOCREV CUR MEDS BY ELIG CLIN: HCPCS | Performed by: NURSE PRACTITIONER

## 2019-12-10 RX ORDER — ALBUTEROL SULFATE 2.5 MG/3ML
SOLUTION RESPIRATORY (INHALATION)
Qty: 375 ML | Refills: 11 | Status: SHIPPED | OUTPATIENT
Start: 2019-12-10 | End: 2021-02-05 | Stop reason: SDUPTHER

## 2019-12-10 RX ORDER — BUDESONIDE 0.5 MG/2ML
INHALANT ORAL
Qty: 120 ML | Refills: 11 | Status: SHIPPED | OUTPATIENT
Start: 2019-12-10 | End: 2021-02-05 | Stop reason: SDUPTHER

## 2019-12-10 ASSESSMENT — ENCOUNTER SYMPTOMS
NAUSEA: 0
ALLERGIC/IMMUNOLOGIC NEGATIVE: 1
DIARRHEA: 0
COUGH: 0
STRIDOR: 0
WHEEZING: 0
SHORTNESS OF BREATH: 1
EYES NEGATIVE: 1
CHEST TIGHTNESS: 0
BACK PAIN: 0

## 2019-12-18 ENCOUNTER — HOSPITAL ENCOUNTER (EMERGENCY)
Age: 29
Discharge: HOME | End: 2019-12-18
Payer: COMMERCIAL

## 2019-12-18 VITALS
OXYGEN SATURATION: 96 % | HEIGHT: 65 IN | HEART RATE: 109 BPM | DIASTOLIC BLOOD PRESSURE: 71 MMHG | TEMPERATURE: 97.8 F | RESPIRATION RATE: 20 BRPM | BODY MASS INDEX: 27.16 KG/M2 | WEIGHT: 163 LBS | SYSTOLIC BLOOD PRESSURE: 108 MMHG

## 2019-12-18 DIAGNOSIS — J06.9 URI WITH COUGH AND CONGESTION: Primary | ICD-10-CM

## 2019-12-18 PROCEDURE — 99214 OFFICE O/P EST MOD 30 MIN: CPT | Performed by: NURSE PRACTITIONER

## 2019-12-18 PROCEDURE — 99212 OFFICE O/P EST SF 10 MIN: CPT

## 2019-12-18 RX ORDER — AMOXICILLIN AND CLAVULANATE POTASSIUM 500; 125 MG/1; MG/1
1 TABLET, FILM COATED ORAL 2 TIMES DAILY
Qty: 14 TABLET | Refills: 0 | Status: SHIPPED | OUTPATIENT
Start: 2019-12-18 | End: 2019-12-25

## 2019-12-18 RX ORDER — BENZONATATE 200 MG/1
200 CAPSULE ORAL 3 TIMES DAILY PRN
Qty: 30 CAPSULE | Refills: 0 | Status: SHIPPED | OUTPATIENT
Start: 2019-12-18 | End: 2019-12-28

## 2019-12-18 ASSESSMENT — ENCOUNTER SYMPTOMS
SORE THROAT: 0
WHEEZING: 0
COUGH: 1
EYE REDNESS: 0
EYE DISCHARGE: 0
TROUBLE SWALLOWING: 0
CONSTIPATION: 0
RHINORRHEA: 0
ABDOMINAL PAIN: 0
DIARRHEA: 0
BACK PAIN: 0
ALLERGIC/IMMUNOLOGIC NEGATIVE: 1
EYE PAIN: 0
NAUSEA: 0
SHORTNESS OF BREATH: 0
VOMITING: 0

## 2019-12-18 ASSESSMENT — PAIN DESCRIPTION - LOCATION: LOCATION: RIB CAGE

## 2020-01-27 ENCOUNTER — HOSPITAL ENCOUNTER (EMERGENCY)
Age: 30
Discharge: HOME | End: 2020-01-27
Payer: COMMERCIAL

## 2020-01-27 VITALS
BODY MASS INDEX: 27.99 KG/M2 | SYSTOLIC BLOOD PRESSURE: 98 MMHG | HEIGHT: 65 IN | WEIGHT: 168 LBS | HEART RATE: 98 BPM | RESPIRATION RATE: 16 BRPM | DIASTOLIC BLOOD PRESSURE: 68 MMHG | TEMPERATURE: 97.7 F | OXYGEN SATURATION: 98 %

## 2020-01-27 PROCEDURE — 99212 OFFICE O/P EST SF 10 MIN: CPT

## 2020-01-27 PROCEDURE — 99213 OFFICE O/P EST LOW 20 MIN: CPT | Performed by: NURSE PRACTITIONER

## 2020-01-27 RX ORDER — OSELTAMIVIR PHOSPHATE 75 MG/1
75 CAPSULE ORAL 2 TIMES DAILY
Qty: 10 CAPSULE | Refills: 0 | Status: SHIPPED | OUTPATIENT
Start: 2020-01-27 | End: 2020-02-01

## 2020-01-27 ASSESSMENT — ENCOUNTER SYMPTOMS
SORE THROAT: 1
SHORTNESS OF BREATH: 0
COUGH: 1
NAUSEA: 0
VOMITING: 0

## 2020-01-27 ASSESSMENT — PAIN DESCRIPTION - PAIN TYPE: TYPE: ACUTE PAIN

## 2020-01-27 ASSESSMENT — PAIN DESCRIPTION - LOCATION: LOCATION: GENERALIZED

## 2020-01-27 ASSESSMENT — PAIN SCALES - WONG BAKER: WONGBAKER_NUMERICALRESPONSE: 4

## 2020-01-27 NOTE — ED PROVIDER NOTES
[facility]  Urgent Care Encounter       CHIEF COMPLAINT       Chief Complaint   Patient presents with    Cough    Congestion    Generalized Body Aches    Influenza     brother has flu b thurs       Nurses Notes reviewed and I agree except as noted in the HPI. HISTORY OF PRESENT ILLNESS   Ling Mcneil is a 26 y.o. female who presents for evaluation of body aches, sore throat, cough, fever and chills that been ongoing for the last 24 to 36 hours. Patient's mother at bedside states that the patient has been exposed to influenza B through her brother with whom she spends a lot of time. Mother states that she did give the ibuprofen roughly 2 hours before arrival at the urgent care. Mother does state that the patient has a history of chronic lung disease. The history is provided by the patient. The history is limited by a developmental delay. REVIEW OF SYSTEMS     Review of Systems   Constitutional: Positive for chills and fever. HENT: Positive for congestion and sore throat. Respiratory: Positive for cough. Negative for shortness of breath. Cardiovascular: Negative for chest pain. Gastrointestinal: Negative for nausea and vomiting. Musculoskeletal: Positive for myalgias. Negative for arthralgias. Skin: Negative for rash. Allergic/Immunologic: Negative for environmental allergies. Neurological: Positive for headaches. PAST MEDICAL HISTORY         Diagnosis Date    Anemia     Asthma     [facility] ([facility]     COPD (chronic obstructive pulmonary disease) (HCC)     GERD (gastroesophageal reflux disease)     Heart attack ([address] 3/2016    Mental retardation     Schizophrenia simplex ([facility]        SURGICALHISTORY     Patient  has a past surgical history that includes Gastric fundoplication; laparotomy (3/25/16); Colon surgery; Abdominal exploration surgery (05/27/2016); Eye surgery (1996); Colonoscopy; and Colonoscopy (N/A, 4/25/2019).     CURRENT MEDICATIONS       Previous Medications    ALBUTEROL (PROVENTIL) (2.5 MG/3ML) 0.083% NEBULIZER SOLUTION    inhale contents of 1 vial in nebulizer every 6 hours if needed FOR WHEEZING OR SHORTNESS OF BREATH    ALPRAZOLAM (XANAX PO)    Take 1 mg by mouth 3 times daily as needed. BUDESONIDE (PULMICORT) 0.5 MG/2ML NEBULIZER SUSPENSION    inhale contents of 1 vial in nebulizer twice a day    ETONOGESTREL (NEXPLANON) 68 MG IMPLANT    68 mg by Subdermal route once    METOPROLOL TARTRATE (LOPRESSOR) 25 MG TABLET    Take 1 tablet by mouth 2 times daily    MIDODRINE (PROAMATINE) 5 MG TABLET    take 1 tablet by mouth twice a day    OMEPRAZOLE (PRILOSEC) 20 MG DELAYED RELEASE CAPSULE    Take 1 capsule by mouth every morning (before breakfast)    POLYETHYLENE GLYCOL (GLYCOLAX) POWDER    Dispense 238 Gram Bottle. Use as Directed    QUETIAPINE (SEROQUEL) 300 MG TABLET    Take 1 tablet by mouth daily     SERTRALINE HCL (ZOLOFT PO)    Take 150 mg by mouth every morning        ALLERGIES     Patient is is allergic to inderal [propranolol]. Patients There is no immunization history for the selected administration types on file for this patient. FAMILY HISTORY     Patient's family history includes Diabetes in her mother; High Blood Pressure in her mother. SOCIAL HISTORY     Patient  reports that she is a non-smoker but has been exposed to tobacco smoke. She has never used smokeless tobacco. She reports that she does not drink alcohol or use drugs. PHYSICAL EXAM     ED TRIAGE VITALS  BP: 98/68, Temp: 97.7 °F (36.5 °C), Pulse: 98, Resp: 16, SpO2: 98 %,Estimated body mass index is 27.96 kg/m² as calculated from the following:    Height as of this encounter: 5' 5\" (1.651 m). Weight as of this encounter: 168 lb (76.2 kg). ,No LMP recorded. Patient has had an implant. Physical Exam  Vitals signs and nursing note reviewed. Constitutional:       General: She is not in acute distress. Appearance: She is well-developed.

## 2020-01-27 NOTE — ED TRIAGE NOTES
Rush Newton arrives with parent  to room with complaint of cough congestion symptoms started 1 days ago.   Rush Newton brother has flu b.

## 2020-05-23 ENCOUNTER — HOSPITAL ENCOUNTER (EMERGENCY)
Age: 30
Discharge: HOME | End: 2020-05-23
Attending: EMERGENCY MEDICINE
Payer: COMMERCIAL

## 2020-05-23 VITALS
HEART RATE: 66 BPM | WEIGHT: 167.8 LBS | DIASTOLIC BLOOD PRESSURE: 69 MMHG | RESPIRATION RATE: 14 BRPM | OXYGEN SATURATION: 95 % | BODY MASS INDEX: 27.92 KG/M2 | TEMPERATURE: 98.2 F | SYSTOLIC BLOOD PRESSURE: 108 MMHG

## 2020-05-23 PROCEDURE — 99214 OFFICE O/P EST MOD 30 MIN: CPT | Performed by: EMERGENCY MEDICINE

## 2020-05-23 PROCEDURE — 99212 OFFICE O/P EST SF 10 MIN: CPT

## 2020-05-23 RX ORDER — PREDNISONE 20 MG/1
20 TABLET ORAL DAILY
Qty: 7 TABLET | Refills: 0 | Status: SHIPPED | OUTPATIENT
Start: 2020-05-23 | End: 2020-05-30

## 2020-05-23 RX ORDER — BENZONATATE 200 MG/1
200 CAPSULE ORAL 3 TIMES DAILY PRN
Qty: 15 CAPSULE | Refills: 0 | Status: ON HOLD | OUTPATIENT
Start: 2020-05-23 | End: 2021-02-22 | Stop reason: HOSPADM

## 2020-05-23 RX ORDER — DOXYCYCLINE HYCLATE 100 MG
100 TABLET ORAL 2 TIMES DAILY
Qty: 14 TABLET | Refills: 0 | Status: SHIPPED | OUTPATIENT
Start: 2020-05-23 | End: 2020-05-30

## 2020-05-23 ASSESSMENT — ENCOUNTER SYMPTOMS
EYE DISCHARGE: 0
SORE THROAT: 1
BACK PAIN: 0
FACIAL SWELLING: 0
CHOKING: 0
CONSTIPATION: 0
ABDOMINAL PAIN: 0
VOICE CHANGE: 0
EYE REDNESS: 0
COUGH: 1
BLOOD IN STOOL: 0
TROUBLE SWALLOWING: 0
EYE PAIN: 0
VOMITING: 0
DIARRHEA: 0
RHINORRHEA: 1
WHEEZING: 1
NAUSEA: 0
STRIDOR: 0
SHORTNESS OF BREATH: 0

## 2020-05-23 NOTE — ED NOTES
Parent verbalized discharge instructions. Parent informed to go to ER if develop chest pain, shortness of breath or abdominal pain. Pt ambulatory out in stable condition. Assessment unchanged.        Terese Hathaway RN  05/23/20 [id number]

## 2020-05-23 NOTE — ED TRIAGE NOTES
Pt ambulatory into [facility] with c/o sinus drainage and cough that started last night. Pt states at times her throat hurts. Pt denies pain at this time.

## 2020-05-24 ENCOUNTER — CARE COORDINATION (OUTPATIENT)
Dept: CARE COORDINATION | Age: 30
End: 2020-05-24

## 2020-05-26 NOTE — CARE COORDINATION
Declined Loop enrollment    Patient contacted regarding recent discharge and COVID-19 risk. Discussed COVID-19 related testing which was not done at this time. Test results were not done. Patient informed of results, if available? No     Care Transition Nurse/ Ambulatory Care Manager contacted the family by telephone to perform post discharge assessment. Verified name and  with family as identifiers. Patient has following risk factors of: COPD. CTN/ACM reviewed discharge instructions, medical action plan and red flags related to discharge diagnosis. Reviewed and educated them on any new and changed medications related to discharge diagnosis. Advised obtaining a 90-day supply of all daily and as-needed medications. Education provided regarding infection prevention, and signs and symptoms of COVID-19 and when to seek medical attention with family who verbalized understanding. Discussed exposure protocols and quarantine from [facility] you at higher risk for severe illness  and given an opportunity for questions and concerns. The family agrees to contact the COVID-19 hotline [phone] or PCP office for questions related to their healthcare. CTN/ACM provided contact information for future reference. From CDC: Are you at higher risk for severe illness?  Wash your hands often.  Avoid close contact (6 feet, which is about two arm lengths) with people who are sick.  Put distance between yourself and other people if COVID-19 is spreading in your community.  Clean and disinfect frequently touched surfaces.  Avoid all cruise travel and non-essential air travel.  Call your healthcare professional if you have concerns about COVID-19 and your underlying condition or if you are sick. For more information on steps you can take to protect yourself, see CDC's How to [place] for follow-up call in 7-14 days based on severity of symptoms and risk factors.

## 2020-06-09 ENCOUNTER — CARE COORDINATION (OUTPATIENT)
Dept: CARE COORDINATION | Age: 30
End: 2020-06-09

## 2020-09-29 ENCOUNTER — OFFICE VISIT (OUTPATIENT)
Dept: CARDIOLOGY CLINIC | Age: 30
End: 2020-09-29
Payer: COMMERCIAL

## 2020-09-29 VITALS
SYSTOLIC BLOOD PRESSURE: 130 MMHG | HEART RATE: 75 BPM | BODY MASS INDEX: 27.66 KG/M2 | HEIGHT: 65 IN | WEIGHT: 166 LBS | DIASTOLIC BLOOD PRESSURE: 76 MMHG

## 2020-09-29 PROCEDURE — 1036F TOBACCO NON-USER: CPT | Performed by: NURSE PRACTITIONER

## 2020-09-29 PROCEDURE — G8417 CALC BMI ABV UP PARAM F/U: HCPCS | Performed by: NURSE PRACTITIONER

## 2020-09-29 PROCEDURE — 93000 ELECTROCARDIOGRAM COMPLETE: CPT | Performed by: NURSE PRACTITIONER

## 2020-09-29 PROCEDURE — G8427 DOCREV CUR MEDS BY ELIG CLIN: HCPCS | Performed by: NURSE PRACTITIONER

## 2020-09-29 PROCEDURE — 99213 OFFICE O/P EST LOW 20 MIN: CPT | Performed by: NURSE PRACTITIONER

## 2020-09-29 RX ORDER — MIDODRINE HYDROCHLORIDE 5 MG/1
TABLET ORAL
Qty: 180 TABLET | Refills: 3 | Status: SHIPPED | OUTPATIENT
Start: 2020-09-29

## 2020-09-29 RX ORDER — PRAVASTATIN SODIUM 20 MG
TABLET ORAL
COMMUNITY
Start: 2020-09-13

## 2020-09-29 NOTE — PROGRESS NOTES
C/o palpitations           [facility] PROFESSIONAL SERVICES  [facility]   [address] [place] [address]   [address] [address]   Dept: [phone]   Dept Fax: [phone]   Loc: [phone]      Chief Complaint   Patient presents with    1 Year Follow Up     Year f/u in 25 y/o female with history of palpitations, PVCs, mental retardation, asthma, cerebral palsy, COPD, MI, and schizophrenia. Her mother is with her today. Denies chest pain, sob, RASHEED, lightheadedness, dizziness or syncope. Has intermittent palpitations that are not new or increased.      Cardiologist:  Dr. Barnes Oven:   No fever, no chills, No fatigue or weight loss  Pulmonary:    No dyspnea, no wheezing  Cardiac:    Denies recent chest pain, + intermittent palpitations  GI:     No nausea or vomiting, no abdominal pain  Neuro:    No dizziness or light headedness  Musculoskeletal:  No recent active issues  Extremities:   No edema, good peripheral pulses      Past Medical History:   Diagnosis Date    Anemia     Asthma     [facility]     COPD [facility]ch[facility] obstructive [facility] disease) [facility]     GERD (gastroesophageal reflux disease)     Heart attack ([facility] 3/2016    Mental retardation     Schizophrenia simplex ([facility])        No Known Allergies    Current Outpatient Medications   Medication Sig Dispense Refill    midodrine (PROAMATINE) 5 MG tablet take 1 tablet by mouth twice a day 180 tablet 3    metoprolol tartrate (LOPRESSOR) 25 MG tablet Take 1 tablet by mouth 2 times daily 180 tablet 2    pravastatin (PRAVACHOL) 20 MG tablet take 1 tablet by mouth once daily at bedtime      benzonatate (TESSALON) 200 MG capsule Take 1 capsule by mouth 3 times daily as needed for Cough (Swallow whole, do not chew) Swallow whole, do not chew 15 capsule 0    albuterol (PROVENTIL) (2.5 MG/3ML) 0.083% nebulizer solution inhale contents of 1 vial in nebulizer every 6 hours if needed FOR WHEEZING OR SHORTNESS OF BREATH 375 mL 11    budesonide (PULMICORT) 0.5 MG/2ML nebulizer suspension inhale contents of 1 vial in nebulizer twice a day 120 mL 11    QUEtiapine (SEROQUEL) 300 MG tablet Take 1 tablet by mouth daily       polyethylene glycol (GLYCOLAX) powder Dispense 238 Gram Bottle. Use as Directed 238 g 0    etonogestrel (NEXPLANON) 68 MG implant 68 mg by Subdermal route once      omeprazole (PRILOSEC) 20 MG delayed release capsule Take 1 capsule by mouth every morning (before breakfast) 30 capsule 6    Sertraline HCl (ZOLOFT PO) Take 150 mg by mouth every morning       ALPRAZolam (XANAX PO) Take 1 mg by mouth 3 times daily        No current facility-administered medications for this visit.         Social History     Socioeconomic History    Marital status: Single     Spouse name: None    Number of children: None    Years of education: None    Highest education level: None   Occupational History    None   Social Needs    Financial resource strain: None    Food insecurity     Worry: None     Inability: None    Transportation needs     Medical: None     Non-medical: None   Tobacco Use    Smoking status: Passive Smoke Exposure - Never Smoker    Smokeless tobacco: Never Used   Substance and Sexual Activity    Alcohol use: No    Drug use: No    Sexual activity: Never   Lifestyle    Physical activity     Days per week: None     Minutes per session: None    Stress: None   Relationships    Social connections     Talks on phone: None     Gets together: None     Attends Yazdanism service: None     Active member of club or organization: None     Attends meetings of clubs or organizations: None     Relationship status: None    Intimate partner violence     Fear of current or ex partner: None     Emotionally abused: None     Physically abused: None     Forced sexual activity: None   Other Topics Concern    None   Social History Narrative    None       Family History   Problem Relation Age of Onset    Diabetes Mother     High Blood Pressure Mother     Colon Cancer Neg Hx     Breast Cancer Neg Hx     Cancer Neg Hx        Blood pressure 130/76, pulse 75, height 5' 5\" (1.651 m), weight 166 lb (75.3 kg), not currently breastfeeding. General:   Well developed, well nourished  Lungs:   Clear to auscultation  Heart:    Normal S1 S2, No murmur, rubs, or gallops  Abdomen:   Soft, non tender, no organomegalies, positive bowel sounds  Extremities:   No edema, no cyanosis, good peripheral pulses  Neurological:   Awake, alert, oriented. No obvious focal deficits  Musculoskeletal:  No obvious deformities    EKG: SR, pvc's, no acute abnormalities    Echo: 8/29/19  Summary   Ejection fraction is visually estimated at 50%. Overall left ventricular function is normal.      Signature      ----------------------------------------------------------------   Electronically signed by Connor Flores MD     Tilt table test:   CONCLUSION:  1. Tilt table test associated with no significant symptoms. 2.  No significant hemodynamic changes. 3.  Otherwise, unremarkable tilt table test.           Estela Blood M.D. Holter monitor: 3/23/18  CONCLUSION:  1. Predominantly sinus rhythm. 2.  Frequent number of premature ventricular beats. 3.  No sustained arrhythmias. 4.  Abnormal Holter monitor. Clinical correlation is recommended.           Estela Blood M.D. Diagnosis Orders   1. Palpitations  EKG 12 Lead   2. Frequent PVCs         Orders Placed This Encounter   Procedures    EKG 12 Lead     Order Specific Question:   Reason for Exam?     Answer: Other   Intermittent palpitations and PVCs  No increase, no chest pain or change in breathing  On lopressor 25 mg twice daily. On midodrine, B/P stable.     Discussed use, benefit, and side effects of prescribed medications. All patient questions answered. Pt's mother voiced understanding. Instructed to continue current medications, diet and exercise.  Continue risk factor modification and medical management. Patient agreed with treatment plan. Follow up as directed.     Continue Dr Carmen Rodrigez current treatment plan  Follow up with Dr Eileen Morales as scheduled or sooner if needed

## 2021-01-20 ENCOUNTER — APPOINTMENT (OUTPATIENT)
Dept: CT IMAGING | Age: 31
DRG: 224 | End: 2021-01-20
Payer: COMMERCIAL

## 2021-01-20 ENCOUNTER — APPOINTMENT (OUTPATIENT)
Dept: GENERAL RADIOLOGY | Age: 31
DRG: 224 | End: 2021-01-20
Payer: COMMERCIAL

## 2021-01-20 ENCOUNTER — HOSPITAL ENCOUNTER (INPATIENT)
Age: 31
LOS: 3 days | Discharge: HOME | DRG: 224 | End: 2021-01-24
Attending: SURGERY | Admitting: SURGERY
Payer: COMMERCIAL

## 2021-01-20 DIAGNOSIS — K56.600 PARTIAL SMALL BOWEL OBSTRUCTION (HCC): Primary | ICD-10-CM

## 2021-01-20 DIAGNOSIS — K45.8 INTERNAL HERNIA: ICD-10-CM

## 2021-01-20 LAB
ALBUMIN SERPL-MCNC: 4.7 G/DL (ref 3.5–5.1)
ALP BLD-CCNC: 79 U/L (ref 38–126)
ALT SERPL-CCNC: 13 U/L (ref 11–66)
ANION GAP SERPL CALCULATED.3IONS-SCNC: 15 MEQ/L (ref 8–16)
AST SERPL-CCNC: 15 U/L (ref 5–40)
BACTERIA: ABNORMAL /HPF
BASOPHILS # BLD: 0.2 %
BASOPHILS ABSOLUTE: 0 THOU/MM3 (ref 0–0.1)
BILIRUB SERPL-MCNC: 0.5 MG/DL (ref 0.3–1.2)
BILIRUBIN URINE: NEGATIVE
BLOOD, URINE: NEGATIVE
BUN BLDV-MCNC: 15 MG/DL (ref 7–22)
CALCIUM SERPL-MCNC: 10 MG/DL (ref 8.5–10.5)
CASTS 2: ABNORMAL /LPF
CASTS UA: ABNORMAL /LPF
CHARACTER, URINE: CLEAR
CHLORIDE BLD-SCNC: 99 MEQ/L (ref 98–111)
CO2: 21 MEQ/L (ref 23–33)
COLOR: ABNORMAL
CREAT SERPL-MCNC: 0.9 MG/DL (ref 0.4–1.2)
CRYSTALS, UA: ABNORMAL
EKG ATRIAL RATE: 70 BPM
EKG P AXIS: 32 DEGREES
EKG P-R INTERVAL: 102 MS
EKG Q-T INTERVAL: 430 MS
EKG QRS DURATION: 78 MS
EKG QTC CALCULATION (BAZETT): 464 MS
EKG R AXIS: 118 DEGREES
EKG T AXIS: 154 DEGREES
EKG VENTRICULAR RATE: 70 BPM
EOSINOPHIL # BLD: 0.1 %
EOSINOPHILS ABSOLUTE: 0 THOU/MM3 (ref 0–0.4)
EPITHELIAL CELLS, UA: ABNORMAL /HPF
ERYTHROCYTE [DISTWIDTH] IN BLOOD BY AUTOMATED COUNT: 13 % (ref 11.5–14.5)
ERYTHROCYTE [DISTWIDTH] IN BLOOD BY AUTOMATED COUNT: 44.3 FL (ref 35–45)
GFR SERPL CREATININE-BSD FRML MDRD: 73 ML/MIN/1.73M2
GLUCOSE BLD-MCNC: 190 MG/DL (ref 70–108)
GLUCOSE URINE: NEGATIVE MG/DL
HCT VFR BLD CALC: 49.5 % (ref 37–47)
HEMOGLOBIN: 16.5 GM/DL (ref 12–16)
IMMATURE GRANS (ABS): 0.09 THOU/MM3 (ref 0–0.07)
IMMATURE GRANULOCYTES: 0.6 %
KETONES, URINE: ABNORMAL
LEUKOCYTE ESTERASE, URINE: NEGATIVE
LIPASE: 33.4 U/L (ref 5.6–51.3)
LYMPHOCYTES # BLD: 6.6 %
LYMPHOCYTES ABSOLUTE: 1 THOU/MM3 (ref 1–4.8)
MCH RBC QN AUTO: 31.3 PG (ref 26–33)
MCHC RBC AUTO-ENTMCNC: 33.3 GM/DL (ref 32.2–35.5)
MCV RBC AUTO: 93.8 FL (ref 81–99)
MISCELLANEOUS 2: ABNORMAL
MONOCYTES # BLD: 3.7 %
MONOCYTES ABSOLUTE: 0.6 THOU/MM3 (ref 0.4–1.3)
NITRITE, URINE: NEGATIVE
NUCLEATED RED BLOOD CELLS: 0 /100 WBC
OSMOLALITY CALCULATION: 276 MOSMOL/KG (ref 275–300)
PH UA: 5.5 (ref 5–9)
PLATELET # BLD: 313 THOU/MM3 (ref 130–400)
PMV BLD AUTO: 11.3 FL (ref 9.4–12.4)
POTASSIUM SERPL-SCNC: 3.1 MEQ/L (ref 3.5–5.2)
PREGNANCY, SERUM: NEGATIVE
PROTEIN UA: 100
RBC # BLD: 5.28 MILL/MM3 (ref 4.2–5.4)
RBC URINE: ABNORMAL /HPF
RENAL EPITHELIAL, UA: ABNORMAL
SEG NEUTROPHILS: 88.8 %
SEGMENTED NEUTROPHILS ABSOLUTE COUNT: 13.5 THOU/MM3 (ref 1.8–7.7)
SODIUM BLD-SCNC: 135 MEQ/L (ref 135–145)
SPECIFIC GRAVITY, URINE: 1.02 (ref 1–1.03)
TOTAL PROTEIN: 8.7 G/DL (ref 6.1–8)
UROBILINOGEN, URINE: 1 EU/DL (ref 0–1)
WBC # BLD: 15.2 THOU/MM3 (ref 4.8–10.8)
WBC UA: ABNORMAL /HPF
YEAST: ABNORMAL

## 2021-01-20 PROCEDURE — 2580000003 HC RX 258: Performed by: NURSE PRACTITIONER

## 2021-01-20 PROCEDURE — P9612 CATHETERIZE FOR URINE SPEC: HCPCS

## 2021-01-20 PROCEDURE — 83690 ASSAY OF LIPASE: CPT

## 2021-01-20 PROCEDURE — 99222 1ST HOSP IP/OBS MODERATE 55: CPT | Performed by: SURGERY

## 2021-01-20 PROCEDURE — 99285 EMERGENCY DEPT VISIT HI MDM: CPT

## 2021-01-20 PROCEDURE — APPSS180 APP SPLIT SHARED TIME > 60 MINUTES: Performed by: PHYSICIAN ASSISTANT

## 2021-01-20 PROCEDURE — 96361 HYDRATE IV INFUSION ADD-ON: CPT

## 2021-01-20 PROCEDURE — 71045 X-RAY EXAM CHEST 1 VIEW: CPT

## 2021-01-20 PROCEDURE — 81001 URINALYSIS AUTO W/SCOPE: CPT

## 2021-01-20 PROCEDURE — 74176 CT ABD & PELVIS W/O CONTRAST: CPT

## 2021-01-20 PROCEDURE — 6360000002 HC RX W HCPCS: Performed by: NURSE PRACTITIONER

## 2021-01-20 PROCEDURE — 80053 COMPREHEN METABOLIC PANEL: CPT

## 2021-01-20 PROCEDURE — 85025 COMPLETE CBC W/AUTO DIFF WBC: CPT

## 2021-01-20 PROCEDURE — 84703 CHORIONIC GONADOTROPIN ASSAY: CPT

## 2021-01-20 PROCEDURE — 93005 ELECTROCARDIOGRAM TRACING: CPT | Performed by: NURSE PRACTITIONER

## 2021-01-20 PROCEDURE — 96374 THER/PROPH/DIAG INJ IV PUSH: CPT

## 2021-01-20 PROCEDURE — 36415 COLL VENOUS BLD VENIPUNCTURE: CPT

## 2021-01-20 PROCEDURE — 83735 ASSAY OF MAGNESIUM: CPT

## 2021-01-20 RX ORDER — 0.9 % SODIUM CHLORIDE 0.9 %
1000 INTRAVENOUS SOLUTION INTRAVENOUS ONCE
Status: COMPLETED | OUTPATIENT
Start: 2021-01-20 | End: 2021-01-20

## 2021-01-20 RX ORDER — ONDANSETRON 2 MG/ML
4 INJECTION INTRAMUSCULAR; INTRAVENOUS ONCE
Status: COMPLETED | OUTPATIENT
Start: 2021-01-20 | End: 2021-01-20

## 2021-01-20 RX ADMIN — SODIUM CHLORIDE 1000 ML: 9 INJECTION, SOLUTION INTRAVENOUS at 21:00

## 2021-01-20 RX ADMIN — ONDANSETRON 4 MG: 2 INJECTION INTRAMUSCULAR; INTRAVENOUS at 21:00

## 2021-01-20 ASSESSMENT — ENCOUNTER SYMPTOMS
DIARRHEA: 0
VOMITING: 1
TROUBLE SWALLOWING: 0
BLOOD IN STOOL: 0
CHOKING: 0
ABDOMINAL PAIN: 1
COUGH: 1
ABDOMINAL DISTENTION: 0
CONSTIPATION: 0
BACK PAIN: 0
CHEST TIGHTNESS: 0
SHORTNESS OF BREATH: 0
COLOR CHANGE: 0
NAUSEA: 1

## 2021-01-20 NOTE — LETTER
Uyen Cline accompanied Monico Schwab throughout her hospital stay on 1/20/2021- 1/24/2021. Please excuse her for her time off. If you have any questions or concerns, please don't hesitate to call.     C.Sturgeon RN

## 2021-01-21 ENCOUNTER — ANESTHESIA EVENT (OUTPATIENT)
Dept: OPERATING ROOM | Age: 31
DRG: 224 | End: 2021-01-21
Payer: COMMERCIAL

## 2021-01-21 ENCOUNTER — APPOINTMENT (OUTPATIENT)
Dept: GENERAL RADIOLOGY | Age: 31
DRG: 224 | End: 2021-01-21
Payer: COMMERCIAL

## 2021-01-21 ENCOUNTER — ANESTHESIA (OUTPATIENT)
Dept: OPERATING ROOM | Age: 31
DRG: 224 | End: 2021-01-21
Payer: COMMERCIAL

## 2021-01-21 VITALS
RESPIRATION RATE: 1 BRPM | TEMPERATURE: 98.1 F | DIASTOLIC BLOOD PRESSURE: 68 MMHG | SYSTOLIC BLOOD PRESSURE: 112 MMHG | OXYGEN SATURATION: 98 %

## 2021-01-21 PROBLEM — K56.600 PARTIAL SMALL BOWEL OBSTRUCTION (HCC): Status: ACTIVE | Noted: 2021-01-21

## 2021-01-21 LAB
ANION GAP SERPL CALCULATED.3IONS-SCNC: 12 MEQ/L (ref 8–16)
AVERAGE GLUCOSE: 108 MG/DL (ref 70–126)
BUN BLDV-MCNC: 19 MG/DL (ref 7–22)
CALCIUM SERPL-MCNC: 9.4 MG/DL (ref 8.5–10.5)
CHLORIDE BLD-SCNC: 104 MEQ/L (ref 98–111)
CO2: 22 MEQ/L (ref 23–33)
CREAT SERPL-MCNC: 0.9 MG/DL (ref 0.4–1.2)
ERYTHROCYTE [DISTWIDTH] IN BLOOD BY AUTOMATED COUNT: 12.8 % (ref 11.5–14.5)
ERYTHROCYTE [DISTWIDTH] IN BLOOD BY AUTOMATED COUNT: 44.6 FL (ref 35–45)
GFR SERPL CREATININE-BSD FRML MDRD: 73 ML/MIN/1.73M2
GLUCOSE BLD-MCNC: 130 MG/DL (ref 70–108)
GLUCOSE BLD-MCNC: 137 MG/DL (ref 70–108)
GLUCOSE BLD-MCNC: 142 MG/DL (ref 70–108)
HBA1C MFR BLD: 5.6 % (ref 4.4–6.4)
HCT VFR BLD CALC: 45.4 % (ref 37–47)
HEMOGLOBIN: 15 GM/DL (ref 12–16)
MAGNESIUM: 1.8 MG/DL (ref 1.6–2.4)
MAGNESIUM: 1.9 MG/DL (ref 1.6–2.4)
MCH RBC QN AUTO: 31.4 PG (ref 26–33)
MCHC RBC AUTO-ENTMCNC: 33 GM/DL (ref 32.2–35.5)
MCV RBC AUTO: 95.2 FL (ref 81–99)
PLATELET # BLD: 281 THOU/MM3 (ref 130–400)
PMV BLD AUTO: 11.3 FL (ref 9.4–12.4)
POTASSIUM REFLEX MAGNESIUM: 3.3 MEQ/L (ref 3.5–5.2)
PROCALCITONIN: 0.06 NG/ML (ref 0.01–0.09)
RBC # BLD: 4.77 MILL/MM3 (ref 4.2–5.4)
SARS-COV-2, NAAT: NOT DETECTED
SODIUM BLD-SCNC: 138 MEQ/L (ref 135–145)
WBC # BLD: 12.4 THOU/MM3 (ref 4.8–10.8)

## 2021-01-21 PROCEDURE — 85027 COMPLETE CBC AUTOMATED: CPT

## 2021-01-21 PROCEDURE — 2500000003 HC RX 250 WO HCPCS: Performed by: NURSE ANESTHETIST, CERTIFIED REGISTERED

## 2021-01-21 PROCEDURE — 6360000002 HC RX W HCPCS: Performed by: ANESTHESIOLOGY

## 2021-01-21 PROCEDURE — 6370000000 HC RX 637 (ALT 250 FOR IP): Performed by: SURGERY

## 2021-01-21 PROCEDURE — 6360000002 HC RX W HCPCS: Performed by: SURGERY

## 2021-01-21 PROCEDURE — 0DNA0ZZ RELEASE JEJUNUM, OPEN APPROACH: ICD-10-PCS | Performed by: SURGERY

## 2021-01-21 PROCEDURE — 2580000003 HC RX 258: Performed by: PHYSICIAN ASSISTANT

## 2021-01-21 PROCEDURE — 6360000002 HC RX W HCPCS: Performed by: PHYSICIAN ASSISTANT

## 2021-01-21 PROCEDURE — 2500000003 HC RX 250 WO HCPCS: Performed by: PHYSICIAN ASSISTANT

## 2021-01-21 PROCEDURE — 3700000000 HC ANESTHESIA ATTENDED CARE: Performed by: SURGERY

## 2021-01-21 PROCEDURE — 82948 REAGENT STRIP/BLOOD GLUCOSE: CPT

## 2021-01-21 PROCEDURE — 74018 RADEX ABDOMEN 1 VIEW: CPT

## 2021-01-21 PROCEDURE — 3600000004 HC SURGERY LEVEL 4 BASE: Performed by: SURGERY

## 2021-01-21 PROCEDURE — 7100000000 HC PACU RECOVERY - FIRST 15 MIN: Performed by: SURGERY

## 2021-01-21 PROCEDURE — 94640 AIRWAY INHALATION TREATMENT: CPT

## 2021-01-21 PROCEDURE — 2580000003 HC RX 258: Performed by: SURGERY

## 2021-01-21 PROCEDURE — 36415 COLL VENOUS BLD VENIPUNCTURE: CPT

## 2021-01-21 PROCEDURE — 84145 PROCALCITONIN (PCT): CPT

## 2021-01-21 PROCEDURE — 2580000003 HC RX 258: Performed by: NURSE ANESTHETIST, CERTIFIED REGISTERED

## 2021-01-21 PROCEDURE — 1200000000 HC SEMI PRIVATE

## 2021-01-21 PROCEDURE — 2709999900 HC NON-CHARGEABLE SUPPLY: Performed by: SURGERY

## 2021-01-21 PROCEDURE — 83735 ASSAY OF MAGNESIUM: CPT

## 2021-01-21 PROCEDURE — 44005 FREEING OF BOWEL ADHESION: CPT | Performed by: SURGERY

## 2021-01-21 PROCEDURE — 3600000014 HC SURGERY LEVEL 4 ADDTL 15MIN: Performed by: SURGERY

## 2021-01-21 PROCEDURE — 6360000002 HC RX W HCPCS: Performed by: NURSE ANESTHETIST, CERTIFIED REGISTERED

## 2021-01-21 PROCEDURE — 7100000001 HC PACU RECOVERY - ADDTL 15 MIN: Performed by: SURGERY

## 2021-01-21 PROCEDURE — 80048 BASIC METABOLIC PNL TOTAL CA: CPT

## 2021-01-21 PROCEDURE — 3700000001 HC ADD 15 MINUTES (ANESTHESIA): Performed by: SURGERY

## 2021-01-21 PROCEDURE — 83036 HEMOGLOBIN GLYCOSYLATED A1C: CPT

## 2021-01-21 PROCEDURE — U0002 COVID-19 LAB TEST NON-CDC: HCPCS

## 2021-01-21 PROCEDURE — 94760 N-INVAS EAR/PLS OXIMETRY 1: CPT

## 2021-01-21 RX ORDER — ACETAMINOPHEN 500 MG
500 TABLET ORAL EVERY 6 HOURS PRN
COMMUNITY

## 2021-01-21 RX ORDER — PHENYLEPHRINE HYDROCHLORIDE 10 MG/ML
INJECTION INTRAVENOUS PRN
Status: DISCONTINUED | OUTPATIENT
Start: 2021-01-21 | End: 2021-01-21 | Stop reason: SDUPTHER

## 2021-01-21 RX ORDER — ALPRAZOLAM 0.5 MG/1
1 TABLET ORAL 3 TIMES DAILY
Status: DISCONTINUED | OUTPATIENT
Start: 2021-01-21 | End: 2021-01-21

## 2021-01-21 RX ORDER — MIDODRINE HYDROCHLORIDE 5 MG/1
5 TABLET ORAL 2 TIMES DAILY WITH MEALS
Status: DISCONTINUED | OUTPATIENT
Start: 2021-01-21 | End: 2021-01-24 | Stop reason: HOSPADM

## 2021-01-21 RX ORDER — PROMETHAZINE HYDROCHLORIDE 25 MG/ML
12.5 INJECTION, SOLUTION INTRAMUSCULAR; INTRAVENOUS
Status: DISCONTINUED | OUTPATIENT
Start: 2021-01-21 | End: 2021-01-21 | Stop reason: HOSPADM

## 2021-01-21 RX ORDER — LABETALOL 20 MG/4 ML (5 MG/ML) INTRAVENOUS SYRINGE
5 EVERY 10 MIN PRN
Status: DISCONTINUED | OUTPATIENT
Start: 2021-01-21 | End: 2021-01-21 | Stop reason: HOSPADM

## 2021-01-21 RX ORDER — SODIUM CHLORIDE 0.9 % (FLUSH) 0.9 %
10 SYRINGE (ML) INJECTION EVERY 12 HOURS SCHEDULED
Status: DISCONTINUED | OUTPATIENT
Start: 2021-01-21 | End: 2021-01-21

## 2021-01-21 RX ORDER — SODIUM CHLORIDE 9 MG/ML
INJECTION, SOLUTION INTRAVENOUS CONTINUOUS
Status: DISCONTINUED | OUTPATIENT
Start: 2021-01-21 | End: 2021-01-24 | Stop reason: HOSPADM

## 2021-01-21 RX ORDER — PROPOFOL 10 MG/ML
INJECTION, EMULSION INTRAVENOUS PRN
Status: DISCONTINUED | OUTPATIENT
Start: 2021-01-21 | End: 2021-01-21 | Stop reason: SDUPTHER

## 2021-01-21 RX ORDER — FENTANYL CITRATE 50 UG/ML
INJECTION, SOLUTION INTRAMUSCULAR; INTRAVENOUS PRN
Status: DISCONTINUED | OUTPATIENT
Start: 2021-01-21 | End: 2021-01-21 | Stop reason: SDUPTHER

## 2021-01-21 RX ORDER — SODIUM CHLORIDE 0.9 % (FLUSH) 0.9 %
10 SYRINGE (ML) INJECTION PRN
Status: DISCONTINUED | OUTPATIENT
Start: 2021-01-21 | End: 2021-01-21

## 2021-01-21 RX ORDER — LIDOCAINE HCL/PF 100 MG/5ML
SYRINGE (ML) INJECTION PRN
Status: DISCONTINUED | OUTPATIENT
Start: 2021-01-21 | End: 2021-01-21 | Stop reason: SDUPTHER

## 2021-01-21 RX ORDER — MIDAZOLAM HYDROCHLORIDE 1 MG/ML
INJECTION INTRAMUSCULAR; INTRAVENOUS PRN
Status: DISCONTINUED | OUTPATIENT
Start: 2021-01-21 | End: 2021-01-21 | Stop reason: SDUPTHER

## 2021-01-21 RX ORDER — MORPHINE SULFATE 4 MG/ML
4 INJECTION, SOLUTION INTRAMUSCULAR; INTRAVENOUS
Status: DISCONTINUED | OUTPATIENT
Start: 2021-01-21 | End: 2021-01-24 | Stop reason: HOSPADM

## 2021-01-21 RX ORDER — ALBUTEROL SULFATE 2.5 MG/3ML
2.5 SOLUTION RESPIRATORY (INHALATION) EVERY 6 HOURS PRN
Status: DISCONTINUED | OUTPATIENT
Start: 2021-01-21 | End: 2021-01-24 | Stop reason: HOSPADM

## 2021-01-21 RX ORDER — SODIUM CHLORIDE 0.9 % (FLUSH) 0.9 %
10 SYRINGE (ML) INJECTION EVERY 12 HOURS SCHEDULED
Status: DISCONTINUED | OUTPATIENT
Start: 2021-01-21 | End: 2021-01-24 | Stop reason: HOSPADM

## 2021-01-21 RX ORDER — QUETIAPINE FUMARATE 400 MG/1
400 TABLET, FILM COATED ORAL DAILY
Status: DISCONTINUED | OUTPATIENT
Start: 2021-01-21 | End: 2021-01-21

## 2021-01-21 RX ORDER — FENTANYL CITRATE 50 UG/ML
25 INJECTION, SOLUTION INTRAMUSCULAR; INTRAVENOUS EVERY 5 MIN PRN
Status: DISCONTINUED | OUTPATIENT
Start: 2021-01-21 | End: 2021-01-21 | Stop reason: HOSPADM

## 2021-01-21 RX ORDER — ONDANSETRON 4 MG/1
4 TABLET, ORALLY DISINTEGRATING ORAL EVERY 8 HOURS PRN
Status: DISCONTINUED | OUTPATIENT
Start: 2021-01-21 | End: 2021-01-24 | Stop reason: HOSPADM

## 2021-01-21 RX ORDER — SODIUM CHLORIDE 0.9 % (FLUSH) 0.9 %
10 SYRINGE (ML) INJECTION PRN
Status: DISCONTINUED | OUTPATIENT
Start: 2021-01-21 | End: 2021-01-24 | Stop reason: HOSPADM

## 2021-01-21 RX ORDER — ROCURONIUM BROMIDE 10 MG/ML
INJECTION, SOLUTION INTRAVENOUS PRN
Status: DISCONTINUED | OUTPATIENT
Start: 2021-01-21 | End: 2021-01-21 | Stop reason: SDUPTHER

## 2021-01-21 RX ORDER — ONDANSETRON 2 MG/ML
4 INJECTION INTRAMUSCULAR; INTRAVENOUS EVERY 6 HOURS PRN
Status: DISCONTINUED | OUTPATIENT
Start: 2021-01-21 | End: 2021-01-24 | Stop reason: HOSPADM

## 2021-01-21 RX ORDER — DEXTROSE MONOHYDRATE 50 MG/ML
100 INJECTION, SOLUTION INTRAVENOUS PRN
Status: DISCONTINUED | OUTPATIENT
Start: 2021-01-21 | End: 2021-01-24 | Stop reason: HOSPADM

## 2021-01-21 RX ORDER — POTASSIUM CHLORIDE 7.45 MG/ML
10 INJECTION INTRAVENOUS PRN
Status: DISCONTINUED | OUTPATIENT
Start: 2021-01-21 | End: 2021-01-24 | Stop reason: HOSPADM

## 2021-01-21 RX ORDER — FENTANYL CITRATE 50 UG/ML
50 INJECTION, SOLUTION INTRAMUSCULAR; INTRAVENOUS EVERY 5 MIN PRN
Status: DISCONTINUED | OUTPATIENT
Start: 2021-01-21 | End: 2021-01-21 | Stop reason: HOSPADM

## 2021-01-21 RX ORDER — DEXTROSE MONOHYDRATE 25 G/50ML
12.5 INJECTION, SOLUTION INTRAVENOUS PRN
Status: DISCONTINUED | OUTPATIENT
Start: 2021-01-21 | End: 2021-01-24 | Stop reason: HOSPADM

## 2021-01-21 RX ORDER — ALPRAZOLAM 0.5 MG/1
1 TABLET ORAL 3 TIMES DAILY PRN
Status: DISCONTINUED | OUTPATIENT
Start: 2021-01-21 | End: 2021-01-24 | Stop reason: HOSPADM

## 2021-01-21 RX ORDER — MORPHINE SULFATE 2 MG/ML
2 INJECTION, SOLUTION INTRAMUSCULAR; INTRAVENOUS
Status: DISCONTINUED | OUTPATIENT
Start: 2021-01-21 | End: 2021-01-24 | Stop reason: HOSPADM

## 2021-01-21 RX ORDER — SODIUM CHLORIDE 9 MG/ML
INJECTION, SOLUTION INTRAVENOUS CONTINUOUS
Status: DISCONTINUED | OUTPATIENT
Start: 2021-01-21 | End: 2021-01-21 | Stop reason: DRUGHIGH

## 2021-01-21 RX ORDER — DEXAMETHASONE SODIUM PHOSPHATE 10 MG/ML
INJECTION, EMULSION INTRAMUSCULAR; INTRAVENOUS PRN
Status: DISCONTINUED | OUTPATIENT
Start: 2021-01-21 | End: 2021-01-21 | Stop reason: SDUPTHER

## 2021-01-21 RX ORDER — POTASSIUM CHLORIDE 20 MEQ/1
40 TABLET, EXTENDED RELEASE ORAL PRN
Status: DISCONTINUED | OUTPATIENT
Start: 2021-01-21 | End: 2021-01-24 | Stop reason: HOSPADM

## 2021-01-21 RX ORDER — NICOTINE POLACRILEX 4 MG
15 LOZENGE BUCCAL PRN
Status: DISCONTINUED | OUTPATIENT
Start: 2021-01-21 | End: 2021-01-24 | Stop reason: HOSPADM

## 2021-01-21 RX ORDER — ONDANSETRON 2 MG/ML
INJECTION INTRAMUSCULAR; INTRAVENOUS PRN
Status: DISCONTINUED | OUTPATIENT
Start: 2021-01-21 | End: 2021-01-21 | Stop reason: SDUPTHER

## 2021-01-21 RX ORDER — PRAVASTATIN SODIUM 20 MG
20 TABLET ORAL NIGHTLY
Status: DISCONTINUED | OUTPATIENT
Start: 2021-01-21 | End: 2021-01-24 | Stop reason: HOSPADM

## 2021-01-21 RX ORDER — OMEPRAZOLE 20 MG/1
20 CAPSULE, DELAYED RELEASE ORAL DAILY
COMMUNITY
End: 2021-06-23

## 2021-01-21 RX ORDER — MEPERIDINE HYDROCHLORIDE 25 MG/ML
12.5 INJECTION INTRAMUSCULAR; INTRAVENOUS; SUBCUTANEOUS EVERY 5 MIN PRN
Status: DISCONTINUED | OUTPATIENT
Start: 2021-01-21 | End: 2021-01-21 | Stop reason: HOSPADM

## 2021-01-21 RX ORDER — SODIUM CHLORIDE 9 MG/ML
INJECTION, SOLUTION INTRAVENOUS CONTINUOUS PRN
Status: DISCONTINUED | OUTPATIENT
Start: 2021-01-21 | End: 2021-01-21 | Stop reason: SDUPTHER

## 2021-01-21 RX ORDER — QUETIAPINE FUMARATE 400 MG/1
400 TABLET, FILM COATED ORAL NIGHTLY
Status: DISCONTINUED | OUTPATIENT
Start: 2021-01-21 | End: 2021-01-24 | Stop reason: HOSPADM

## 2021-01-21 RX ORDER — BUDESONIDE 0.5 MG/2ML
0.5 INHALANT ORAL 2 TIMES DAILY
Status: DISCONTINUED | OUTPATIENT
Start: 2021-01-21 | End: 2021-01-24 | Stop reason: HOSPADM

## 2021-01-21 RX ADMIN — MORPHINE SULFATE 2 MG: 2 INJECTION, SOLUTION INTRAMUSCULAR; INTRAVENOUS at 02:25

## 2021-01-21 RX ADMIN — ALPRAZOLAM 1 MG: 0.5 TABLET ORAL at 03:56

## 2021-01-21 RX ADMIN — MORPHINE SULFATE 2 MG: 2 INJECTION, SOLUTION INTRAMUSCULAR; INTRAVENOUS at 19:49

## 2021-01-21 RX ADMIN — FAMOTIDINE 20 MG: 10 INJECTION, SOLUTION INTRAVENOUS at 02:25

## 2021-01-21 RX ADMIN — PROPOFOL 150 MG: 10 INJECTION, EMULSION INTRAVENOUS at 11:36

## 2021-01-21 RX ADMIN — FENTANYL CITRATE 100 MCG: 50 INJECTION, SOLUTION INTRAMUSCULAR; INTRAVENOUS at 12:05

## 2021-01-21 RX ADMIN — PRAVASTATIN SODIUM 20 MG: 20 TABLET ORAL at 19:51

## 2021-01-21 RX ADMIN — POTASSIUM CHLORIDE 10 MEQ: 7.46 INJECTION, SOLUTION INTRAVENOUS at 06:42

## 2021-01-21 RX ADMIN — CEFOXITIN SODIUM 2000 MG: 2 POWDER, FOR SOLUTION INTRAVENOUS at 17:11

## 2021-01-21 RX ADMIN — SODIUM CHLORIDE: 9 INJECTION, SOLUTION INTRAVENOUS at 11:47

## 2021-01-21 RX ADMIN — POTASSIUM CHLORIDE 10 MEQ: 7.46 INJECTION, SOLUTION INTRAVENOUS at 08:55

## 2021-01-21 RX ADMIN — BUDESONIDE 500 MCG: 0.5 INHALANT RESPIRATORY (INHALATION) at 07:18

## 2021-01-21 RX ADMIN — SODIUM CHLORIDE: 9 INJECTION, SOLUTION INTRAVENOUS at 02:25

## 2021-01-21 RX ADMIN — POTASSIUM CHLORIDE 10 MEQ: 7.46 INJECTION, SOLUTION INTRAVENOUS at 10:40

## 2021-01-21 RX ADMIN — FAMOTIDINE 20 MG: 10 INJECTION, SOLUTION INTRAVENOUS at 07:59

## 2021-01-21 RX ADMIN — POTASSIUM CHLORIDE 10 MEQ: 7.46 INJECTION, SOLUTION INTRAVENOUS at 04:54

## 2021-01-21 RX ADMIN — CEFOXITIN SODIUM 2000 MG: 2 POWDER, FOR SOLUTION INTRAVENOUS at 23:45

## 2021-01-21 RX ADMIN — FENTANYL CITRATE 50 MCG: 50 INJECTION, SOLUTION INTRAMUSCULAR; INTRAVENOUS at 11:50

## 2021-01-21 RX ADMIN — ALPRAZOLAM 1 MG: 0.5 TABLET ORAL at 21:32

## 2021-01-21 RX ADMIN — SODIUM CHLORIDE: 9 INJECTION, SOLUTION INTRAVENOUS at 19:40

## 2021-01-21 RX ADMIN — FENTANYL CITRATE 50 MCG: 50 INJECTION, SOLUTION INTRAMUSCULAR; INTRAVENOUS at 13:00

## 2021-01-21 RX ADMIN — MIDAZOLAM HYDROCHLORIDE 2 MG: 1 INJECTION, SOLUTION INTRAMUSCULAR; INTRAVENOUS at 11:28

## 2021-01-21 RX ADMIN — FAMOTIDINE 20 MG: 10 INJECTION, SOLUTION INTRAVENOUS at 19:49

## 2021-01-21 RX ADMIN — DEXAMETHASONE SODIUM PHOSPHATE 8 MG: 10 INJECTION, EMULSION INTRAMUSCULAR; INTRAVENOUS at 11:43

## 2021-01-21 RX ADMIN — PHENYLEPHRINE HYDROCHLORIDE 100 MCG: 10 INJECTION INTRAVENOUS at 11:49

## 2021-01-21 RX ADMIN — ROCURONIUM BROMIDE 50 MG: 10 INJECTION INTRAVENOUS at 11:36

## 2021-01-21 RX ADMIN — ONDANSETRON HYDROCHLORIDE 4 MG: 4 INJECTION, SOLUTION INTRAMUSCULAR; INTRAVENOUS at 12:12

## 2021-01-21 RX ADMIN — QUETIAPINE FUMARATE 400 MG: 400 TABLET ORAL at 22:25

## 2021-01-21 RX ADMIN — QUETIAPINE FUMARATE 400 MG: 400 TABLET ORAL at 03:56

## 2021-01-21 RX ADMIN — ONDANSETRON 4 MG: 2 INJECTION INTRAMUSCULAR; INTRAVENOUS at 19:49

## 2021-01-21 RX ADMIN — MORPHINE SULFATE 2 MG: 2 INJECTION, SOLUTION INTRAMUSCULAR; INTRAVENOUS at 16:16

## 2021-01-21 RX ADMIN — SUGAMMADEX 140 MG: 100 INJECTION, SOLUTION INTRAVENOUS at 12:29

## 2021-01-21 RX ADMIN — BUDESONIDE 500 MCG: 0.5 INHALANT RESPIRATORY (INHALATION) at 21:15

## 2021-01-21 RX ADMIN — SODIUM CHLORIDE: 9 INJECTION, SOLUTION INTRAVENOUS at 23:46

## 2021-01-21 RX ADMIN — PHENYLEPHRINE HYDROCHLORIDE 100 MCG: 10 INJECTION INTRAVENOUS at 12:11

## 2021-01-21 RX ADMIN — CEFOXITIN SODIUM 2000 MG: 2 POWDER, FOR SOLUTION INTRAVENOUS at 11:43

## 2021-01-21 RX ADMIN — SODIUM CHLORIDE, PRESERVATIVE FREE 10 ML: 5 INJECTION INTRAVENOUS at 08:00

## 2021-01-21 RX ADMIN — Medication 80 MG: at 11:36

## 2021-01-21 RX ADMIN — FENTANYL CITRATE 50 MCG: 50 INJECTION, SOLUTION INTRAMUSCULAR; INTRAVENOUS at 11:36

## 2021-01-21 RX ADMIN — FENTANYL CITRATE 50 MCG: 50 INJECTION, SOLUTION INTRAMUSCULAR; INTRAVENOUS at 13:10

## 2021-01-21 RX ADMIN — SODIUM CHLORIDE: 9 INJECTION, SOLUTION INTRAVENOUS at 11:28

## 2021-01-21 ASSESSMENT — PULMONARY FUNCTION TESTS
PIF_VALUE: 18
PIF_VALUE: 2
PIF_VALUE: 15
PIF_VALUE: 18
PIF_VALUE: 0
PIF_VALUE: 1
PIF_VALUE: 18
PIF_VALUE: 19
PIF_VALUE: 6
PIF_VALUE: 18
PIF_VALUE: 19
PIF_VALUE: 18
PIF_VALUE: 1
PIF_VALUE: 16
PIF_VALUE: 16
PIF_VALUE: 18
PIF_VALUE: 15
PIF_VALUE: 1
PIF_VALUE: 17
PIF_VALUE: 19
PIF_VALUE: 18
PIF_VALUE: 17
PIF_VALUE: 16
PIF_VALUE: 18
PIF_VALUE: 19
PIF_VALUE: 18
PIF_VALUE: 19
PIF_VALUE: 19
PIF_VALUE: 26
PIF_VALUE: 20
PIF_VALUE: 17
PIF_VALUE: 19
PIF_VALUE: 20
PIF_VALUE: 4
PIF_VALUE: 1
PIF_VALUE: 16

## 2021-01-21 ASSESSMENT — PAIN SCALES - WONG BAKER
WONGBAKER_NUMERICALRESPONSE: 6
WONGBAKER_NUMERICALRESPONSE: 2

## 2021-01-21 ASSESSMENT — PAIN DESCRIPTION - LOCATION
LOCATION: ABDOMEN
LOCATION: ABDOMEN
LOCATION: THROAT

## 2021-01-21 ASSESSMENT — PAIN SCALES - GENERAL
PAINLEVEL_OUTOF10: 7
PAINLEVEL_OUTOF10: 4
PAINLEVEL_OUTOF10: 7
PAINLEVEL_OUTOF10: 5
PAINLEVEL_OUTOF10: 5
PAINLEVEL_OUTOF10: 6
PAINLEVEL_OUTOF10: 0

## 2021-01-21 ASSESSMENT — PAIN DESCRIPTION - ORIENTATION: ORIENTATION: MID

## 2021-01-21 ASSESSMENT — PAIN DESCRIPTION - PAIN TYPE: TYPE: ACUTE PAIN

## 2021-01-21 ASSESSMENT — ENCOUNTER SYMPTOMS: SHORTNESS OF BREATH: 1

## 2021-01-21 NOTE — ED NOTES
ED to inpatient nurses report    Chief Complaint   Patient presents with    Emesis      Present to ED from home   LOC: alert and orientated to name, place, date  Vital signs   Vitals:    01/20/21 2101 01/20/21 2139 01/20/21 2232 01/20/21 2337   BP: (!) 114/97 114/79 122/89 124/74   Pulse: 74 73 68 77   Resp: 20 20 20 20   Temp:       TempSrc:       SpO2: 97% 98% 94% 98%      Oxygen Baseline 98% on room air     Current needs required   LDAs:   Peripheral IV 08/29/19 Right Hand (Active)       Peripheral IV 01/20/21 Right Antecubital (Active)   Site Assessment Clean;Dry; Intact 01/20/21 2337   Line Status Normal saline locked 01/20/21 2337   Dressing Status Clean;Dry; Intact 01/20/21 2337     Mobility: Requires assistance * 1  Pending ED orders: N/A  Present condition: VSS    Electronically signed by Anatoly Ortiz RN on 1/21/2021 at 12:05 AM       Anatoly Ortiz RN  01/21/21 0006

## 2021-01-21 NOTE — ED NOTES
Home medications reviewed with pt mom. Pt mom states pt now takes 400 mg for seroquel.       Chandra Huitron RN  01/21/21 9849

## 2021-01-21 NOTE — ANESTHESIA PRE PROCEDURE
benzonatate (TESSALON) 200 MG capsule Take 1 capsule by mouth 3 times daily as needed for Cough (Swallow whole, do not chew) Swallow whole, do not chew 5/23/20   Alissa Campos MD   polyethylene glycol [facility]) powder Dispense 238 Gram Bottle.   Use as Directed 4/3/19   KAYLI Roland CNP   omeprazole (PRILOSEC) 20 MG delayed release capsule Take 1 capsule by mouth every morning (before breakfast) 8/30/18 9/29/20  KAYLI Roland CNP       Current medications:    Current Facility-Administered Medications   Medication Dose Route Frequency Provider Last Rate Last Admin    sodium chloride flush 0.9 % injection 10 mL  10 mL Intravenous 2 times per day Calib Dove, PA-C   10 mL at 01/21/21 0800    sodium chloride flush 0.9 % injection 10 mL  10 mL Intravenous PRN Calib Dove, PA-C        ondansetron (ZOFRAN-ODT) disintegrating tablet 4 mg  4 mg Oral Q8H PRN Calib Dove, PA-C        Or    ondansetron (ZOFRAN) injection 4 mg  4 mg Intravenous Q6H PRN Calib Dove, PA-C        0.9 % sodium chloride infusion   Intravenous Continuous Calib Dove, PA-C 125 mL/hr at 01/21/21 0225 New Bag at 01/21/21 0225    morphine (PF) injection 2 mg  2 mg Intravenous Q2H PRN Calib Dove, PA-C   2 mg at 01/21/21 0225    Or    morphine injection 4 mg  4 mg Intravenous Q2H PRN Calib Dove, PA-C        famotidine (PEPCID) injection 20 mg  20 mg Intravenous BID Calib Dove, PA-C   20 mg at 01/21/21 0759    insulin lispro (HUMALOG) injection vial 0-6 Units  0-6 Units Subcutaneous TID WC Calib Dove, PA-C        insulin lispro (HUMALOG) injection vial 0-3 Units  0-3 Units Subcutaneous Nightly Calib Dove, PA-C        glucose (GLUTOSE) 40 % oral gel 15 g  15 g Oral PRN Calib Dove, PA-C        dextrose 50 % IV solution  12.5 g Intravenous PRN Calib Dove, PA-C        glucagon (rDNA) injection 1 mg  1 mg Intramuscular PRN Calib Dove, PA-C        dextrose 5 % solution  100 mL/hr Intravenous PRN Elizabeth Dove PA-C  potassium chloride (KLOR-CON M) extended release tablet 40 mEq  40 mEq Oral PRN Calib Dove, PA-C        Or    potassium bicarb-citric acid (EFFER-K) effervescent tablet 40 mEq  40 mEq Oral PRN Calib Dove, PA-C        Or    potassium chloride 10 mEq/100 mL IVPB (Peripheral Line)  10 mEq Intravenous PRN Calib Dove, PA-C 100 mL/hr at 01/21/21 1040 10 mEq at 01/21/21 1040    budesonide (PULMICORT) nebulizer suspension 500 mcg  0.5 mg Nebulization BID Calib Dove, PA-C   500 mcg at 01/21/21 9747    albuterol (PROVENTIL) nebulizer solution 2.5 mg  2.5 mg Nebulization Q6H PRN Calib Dove, PA-C        metoprolol tartrate (LOPRESSOR) tablet 25 mg  25 mg Oral BID Calib Dove, PA-C        midodrine (PROAMATINE) tablet 5 mg  5 mg Oral BID WC Calib Dove, PA-C        pravastatin (PRAVACHOL) tablet 20 mg  20 mg Oral Nightly Calib Dove, PA-C        ALPRAZolam Bobbetta Adrien) tablet 1 mg  1 mg Oral TID PRN Kennedi Whitaker MD   1 mg at 01/21/21 0356    QUEtiapine (SEROQUEL) tablet 400 mg  400 mg Oral Nightly Kennedi Whitaker MD   400 mg at 01/21/21 0356    sertraline (ZOLOFT) tablet 150 mg  150 mg Oral QAM Calib Dove, PA-C        cefOXitin (MEFOXIN) 2000 mg in dextrose 5% 50 mL (mini-bag)  2,000 mg Intravenous [facility]        iopamidol (ISOVUE-370) 76 % injection 80 mL  80 mL Intravenous ONCE PRN Kennedi Whitaker MD           Allergies:  No Known Allergies    Problem List:    Patient Active Problem List   Diagnosis Code    S[facility] obstruction) ([facility] K56.609    Small bowel obstruction (HCC) K56.609    Acute hypercapnic respiratory failure (HCC) J96.02    Acute pulmonary edema (HCC) J81.0    Abnormal chest x-ray R93.89    Acute respiratory failure with hypoxia and hypercapnia (HCC) J96.01, J96.02    Dyspnea and respiratory abnormalities R06.00, R06.89    Chronic obstructive pulmonary disease (HCC) J44.9    S/P exploratory laparotomy Z98.890    Collapse of lung J98.19    Cerebral palsy ([facility] G80.9  Acute-on-chronic respiratory failure (HCC) J96.20    Heart attack (HCC) I21.9    Generalized abdominal pain R10.84    Hx of MI < 8 weeks [id number]    S/P laparotomy Z98.890    Chronic respiratory failure with hypoxia (HCC) J96.11    Acute blood loss anemia D62    Non Q wave myocardial infarction (HCC)  work up planned when clinically stable  I21.4    Large bowel obstruction (HCC) K56.609    Microcytic hypochromic anemia D50.9    Postural hypotension I95.1    Tachycardia R00.0    Moderate asthma without complication U22.367    Bronchopulmonary dysplasia P27.1    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    Partial small bowel obstruction (HCC) K56.600       Past Medical History:        Diagnosis Date    Anemia     Asthma     Cerebral palsy (HCC)     COPD (chronic obstructive pulmonary disease) (HCC)     GERD (gastroesophageal reflux disease)     Heart attack ([facility] 3/2016    Hyperlipidemia     Mental retardation     Schizophrenia simplex ([facility]        Past Surgical History:        Procedure Laterality Date    ABDOMEN SURGERY      ABDOMINAL EXPLORATION SURGERY  05/27/2016    lysis of adhesions 2 times    COLON SURGERY      COLONOSCOPY      COLONOSCOPY N/A 4/25/2019    COLONOSCOPY performed by Winnie Moran MD at [address] [place] [address]  3/25/16    exploratory, lysis of adhesions, reduction of internal hernia       Social History:    Social History     Tobacco Use    Smoking status: Passive Smoke Exposure - Never Smoker    Smokeless tobacco: Never Used   Substance Use Topics    Alcohol use:  No                                Counseling given: Not Answered      Vital Signs (Current):   Vitals:    01/21/21 0347 01/21/21 0718 01/21/21 0754 01/21/21 1056   BP: 101/73  101/60 110/77   Pulse: 66  82 78   Resp: 18  16 20   Temp: 97.6 °F (36.4 °C)  97.8 °F (36.6 °C) 97.8 °F (36.6 °C)   TempSrc: Oral  Oral Oral   SpO2: 97% 97% 96% 95%   Weight: Height:                                                  BP Readings from Last 3 Encounters:   01/21/21 110/77   09/29/20 130/76   05/23/20 108/69       NPO Status:                                                                                 BMI:   Wt Readings from Last 3 Encounters:   01/21/21 154 lb 8 oz (70.1 kg)   09/29/20 166 lb (75.3 kg)   05/23/20 167 lb 12.8 oz (76.1 kg)     Body mass index is 25.71 kg/m².     CBC:   Lab Results   Component Value Date    WBC 12.4 01/21/2021    RBC 4.77 01/21/2021    HGB 15.0 01/21/2021    HCT 45.4 01/21/2021    MCV 95.2 01/21/2021    RDW 19.0 02/20/2017     01/21/2021       CMP:   Lab Results   Component Value Date     01/21/2021    K 3.3 01/21/2021     01/21/2021    CO2 22 01/21/2021    BUN 19 01/21/2021    CREATININE 0.9 01/21/2021    LABGLOM 73 01/21/2021    GLUCOSE 137 01/21/2021    PROT 8.7 01/20/2021    CALCIUM 9.4 01/21/2021    BILITOT 0.5 01/20/2021    ALKPHOS 79 01/20/2021    AST 15 01/20/2021    ALT 13 01/20/2021       POC Tests:   Recent Labs     01/21/21  0736   POCGLU 130*       Coags:   Lab Results   Component Value Date    INR 1.15 02/08/2017    APTT 31.2 05/27/2016       HCG (If Applicable):   Lab Results   Component Value Date    PREGTESTUR NEGATIVE 02/20/2017    PREGSERUM NEGATIVE 01/20/2021        ABGs: No results found for: PHART, PO2ART, CIJ4KPW, DTZ0SCG, BEART, T4CJEWCG     Type & Screen (If Applicable):  Lab Results   Component Value Date    LABRH POS 05/27/2016       Drug/Infectious Status (If Applicable):  No results found for: HIV, HEPCAB    COVID-19 Screening (If Applicable):   Lab Results   Component Value Date    COVID19 NOT DETECTED 01/21/2021         Anesthesia Evaluation  Patient summary reviewed and Nursing notes reviewed  Airway: Mallampati: II        Dental:          Pulmonary: breath sounds clear to auscultation  (+) COPD:  shortness of breath: chronic,  asthma:                            Cardiovascular: (+) past MI:,       ECG reviewed  Rhythm: regular  Rate: normal                    Neuro/Psych:   (+) neuromuscular disease:, psychiatric history:            GI/Hepatic/Renal:   (+) GERD:,          ROS comment: Bowel obstruction. Endo/Other:                     Abdominal:       Abdomen: soft. Vascular:                                        Anesthesia Plan      general     ASA 3       Induction: intravenous. MIPS: Postoperative opioids intended and Prophylactic antiemetics administered. Anesthetic plan and risks discussed with patient and spouse. Plan discussed with CRNA.                   [address] [facility],    1/21/2021

## 2021-01-21 NOTE — CARE COORDINATION
DISCHARGE/PLANNING EVALUATION  1/21/21, 2:45 PM EST    Reason for Referral: home health  Mental Status: alert, has developmental delays  Decision Making: mom, Andre Collazo, is POA makes decisions for patient   Family/Social/Home Environment: Dallas Lynn lives at home with her mom and grandmother. Mom shares that Araceli's bedroom is upstairs and she may need to sleep on the couch downstairs until she is stronger   Current Services including food security, transportation and housekeeping: mom provides all needs, denies concerns   Current Equipment: none   Payment Source: Nefsis   Concerns or Barriers to Discharge:  Requesting hh  Post acute provider list with quality measures, geographic area and applicable managed care information provided. Questions regarding selection process answered: declined, prefers [facility]    Teach Back Method used with mom regarding care plan and discharge plan  Patient's mom verbalizes understanding of the plan of care and contributes to goal setting. Patient goals, treatment preferences and discharge plan:  Spoke with mom, Andre Collazo. She is requesting [facility]. Discussed that [facility]  will need to determine if they have available staff. Mom mentions that she works for [employer]. [facility] orders.       Electronically signed by OSCAR Leslie on 1/21/2021 at 2:45 PM You were seen in the Emergency Department for hand pain.  1) Advance activity as tolerated.    2) Continue all previously prescribed medications as directed.  You may take 650 mg Tylenol every 4 hours as needed for pain. You may also take 600 mg Motrin every 6 hours as needed for pain.  3) Follow up with your primary care physician in 24-48 hours - take copies of your results.    4) Return to the Emergency Department for worsening or persistent symptoms, and/or ANY NEW OR CONCERNING SYMPTOMS. If you have issues obtaining follow up, please call: [phone] to obtain a doctor or specialist who takes your insurance in your area. You were seen in the Emergency Department for hand pain.  1) Advance activity as tolerated.    2) Continue all previously prescribed medications as directed.  You may take 650 mg Tylenol every 4 hours as needed for pain. You may also take 600 mg Motrin every 6 hours as needed for pain.  3) Follow up with an orthopedic doctor next week.  4) Return to the Emergency Department for worsening or persistent symptoms, and/or ANY NEW OR CONCERNING SYMPTOMS. If you have issues obtaining follow up, please call: [phone] to obtain a doctor or specialist who takes your insurance in your area.

## 2021-01-21 NOTE — ED TRIAGE NOTES
Pt presents to the ED from home c/o emesis. Pt is MRDD. Pt mom at bedside states that she was called [facility] [facility] because she was spitting up. Pt mom states in the past pt has had bowel obstructions. Pt mom states there are no other complaints. Pt respirations easy and unlabored.

## 2021-01-21 NOTE — ED PROVIDER NOTES
[facility] Emergency Department    CHIEF COMPLAINT       Chief Complaint   Patient presents with    Emesis       Nurses Notes reviewed and I agree except as noted in the HPI. HISTORY OF PRESENT ILLNESS    Bryanna Gutierrez is a 31 y.o. female who presents to the ED for evaluation of emesis. History is mostly given by the mother at beside who states that the patient has been nauseous, dry heaving, and spitting up all day. There is associated moderate epigastric pain that is aching in quality without radiation. Patient was given pepto-bismol today as well as oral zofran, but pt's mom said she thinks pt spit them both up. Mom reports pt is having trouble keeping down fluids. Of note, pt has a history of multiple bowel obstructions. Pt endorses that she has been passing flatus today and her last bowel movement was Monday and was soft. There is also concern that pt has a UTI due to suprapubic pain, dysuria, and frequency. Pt additionally endorses cough and diaphoresis, but denies shortness of breath, chest pain, fevers, new foods in the diet, constipation, diarrhea, hematemesis, hematuria, or hematochezia. Mother is concerned that due to patient having a fundoplication during childhood pt is not able to vomit more than a spit up. HPI was provided by the mother and patient. REVIEW OF SYSTEMS     Review of Systems   Constitutional: Positive for chills and diaphoresis. Negative for fever. HENT: Negative for drooling and trouble swallowing. Respiratory: Positive for cough. Negative for choking, chest tightness and shortness of breath. Cardiovascular: Negative for chest pain, palpitations and leg swelling. Gastrointestinal: Positive for abdominal pain (epigastric), nausea and vomiting. Negative for abdominal distention, blood in stool, constipation and diarrhea. Genitourinary: Positive for decreased urine volume, dysuria, frequency and pelvic pain. Negative for flank pain and hematuria. Musculoskeletal: Negative for arthralgias, back pain and myalgias. Skin: Negative for color change. Neurological: Negative for dizziness, light-headedness and headaches. Psychiatric/Behavioral: Negative for confusion. All other systems negative except as noted. PAST MEDICAL HISTORY     Past Medical History:   Diagnosis Date    Anemia     Asthma     Cerebral palsy ([address]     COPD (chronic obstructive pulmonary disease) (HCC)     GERD (gastroesophageal reflux disease)     [facility] ([facility] 3/2016    Hyperlipidemia     Mental retardation     Schizophrenia simplex ([facility]        SURGICALHISTORY      has a past surgical history that includes Gastric fundoplication; laparotomy (3/25/16); Colon surgery; Abdominal exploration surgery (05/27/2016); Eye surgery (1996); Colonoscopy; Colonoscopy (N/A, 4/25/2019); and Abdomen surgery. CURRENT MEDICATIONS       Current Discharge Medication List      CONTINUE these medications which have NOT CHANGED    Details   omeprazole (PRILOSEC) 20 MG delayed release capsule Take 20 mg by mouth daily      acetaminophen (TYLENOL) 500 MG tablet Take 500 mg by mouth every 6 hours as needed for Pain      midodrine (PROAMATINE) 5 MG tablet take 1 tablet by mouth twice a day  Qty: 180 tablet, Refills: 3      metoprolol tartrate (LOPRESSOR) 25 MG tablet Take 1 tablet by mouth 2 times daily  Qty: 180 tablet, Refills: 2      pravastatin (PRAVACHOL) 20 MG tablet take 1 tablet by mouth once daily at bedtime      albuterol (PROVENTIL) (2.5 MG/3ML) 0.083% nebulizer solution inhale contents of 1 vial in nebulizer every 6 hours if needed FOR WHEEZING OR SHORTNESS OF BREATH  Qty: 375 mL, Refills: 11    Associated Diagnoses: Moderate asthma without complication, unspecified whether persistent      budesonide (PULMICORT) 0.5 MG/2ML nebulizer suspension inhale contents of 1 vial in nebulizer twice a day  Qty: 120 mL, Refills: 11    Associated Diagnoses:  Moderate asthma without complication, unspecified whether persistent      QUEtiapine (SEROQUEL) 300 MG tablet Take 400 mg by mouth daily       etonogestrel (NEXPLANON) 68 MG implant 68 mg by Subdermal route once      Sertraline HCl (ZOLOFT PO) Take 150 mg by mouth every morning       ALPRAZolam (XANAX PO) Take 1 mg by mouth 3 times daily       benzonatate (TESSALON) 200 MG capsule Take 1 capsule by mouth 3 times daily as needed for Cough (Swallow whole, do not chew) Swallow whole, do not chew  Qty: 15 capsule, Refills: 0      polyethylene glycol (GLYCOLAX) powder Dispense 238 Gram Bottle. Use as Directed  Qty: 238 g, Refills: 0    Associated Diagnoses: Ileus ([address] Intermittent abdominal pain; Intermittent constipation; Abdominal distension; History of intestinal obstruction             ALLERGIES     has No Known Allergies. FAMILY HISTORY     She indicated that her mother is alive. She indicated that her father is alive. She indicated that the status of her neg hx is unknown.   family history includes Diabetes in her mother; High Blood Pressure in her mother.     SOCIAL HISTORY       Social History     Socioeconomic History    Marital status: Single     Spouse name: Not on file    Number of children: Not on file    Years of education: Not on file    Highest education level: Not on file   Occupational History    Not on file   Social Needs    Financial resource strain: Not on file    Food insecurity     Worry: Not on file     Inability: Not on file    Transportation needs     Medical: Not on file     Non-medical: Not on file   Tobacco Use    Smoking status: Passive Smoke Exposure - Never Smoker    Smokeless tobacco: Never Used   Substance and Sexual Activity    Alcohol use: No    Drug use: No    Sexual activity: Never   Lifestyle    Physical activity     Days per week: Not on file     Minutes per session: Not on file    Stress: Not on file   Relationships    Social connections     Talks on phone: Not on file     Gets together: Not on file     Attends Hindu service: Not on file     Active member of club or organization: Not on file     Attends meetings of clubs or organizations: Not on file     Relationship status: Not on file    Intimate partner violence     Fear of current or ex partner: Not on file     Emotionally abused: Not on file     Physically abused: Not on file     Forced sexual activity: Not on file   Other Topics Concern    Not on file   Social History Narrative    Not on file       PHYSICAL EXAM     INITIAL VITALS:  height is 5' 5\" (1.651 m) and weight is 154 lb 8 oz (70.1 kg). Her oral temperature is 97.6 °F (36.4 °C). Her blood pressure is 101/73 and her pulse is 66. Her respiration is 18 and oxygen saturation is 97%. Physical Exam  Constitutional:       General: She is not in acute distress. Appearance: She is ill-appearing. She is not diaphoretic. HENT:      Head: Normocephalic and atraumatic. Right Ear: External ear normal.      Left Ear: External ear normal.      Nose: Nose normal.      Mouth/Throat:      Mouth: Mucous membranes are dry. Pharynx: Oropharynx is clear. No oropharyngeal exudate. Eyes:      Extraocular Movements: Extraocular movements intact. Conjunctiva/sclera: Conjunctivae normal.      Pupils: Pupils are equal, round, and reactive to light. Neck:      Musculoskeletal: Normal range of motion. Cardiovascular:      Rate and Rhythm: Normal rate and regular rhythm. Pulses: Normal pulses. Pulmonary:      Effort: Pulmonary effort is normal. No respiratory distress. Breath sounds: Normal breath sounds. No wheezing, rhonchi or rales. Chest:      Chest wall: No tenderness. Abdominal:      General: Abdomen is protuberant. A surgical scar is present. Bowel sounds are decreased. Palpations: There is no mass. Tenderness: There is abdominal tenderness in the epigastric area and periumbilical area.  There is no right CVA tenderness, left CVA tenderness, guarding or rebound. Negative signs include McBurney's sign. Hernia: No hernia is present. Musculoskeletal: Normal range of motion. General: No swelling or tenderness. Right lower leg: No edema. Left lower leg: No edema. Skin:     General: Skin is warm and dry. Capillary Refill: Capillary refill takes less than 2 seconds. Coloration: Skin is not jaundiced or pale. Neurological:      Mental Status: She is alert and oriented to person, place, and time. Mental status is at baseline. Psychiatric:         Mood and Affect: Mood normal.         Behavior: Behavior normal.         Thought Content:  Thought content normal.         Judgment: Judgment normal.         DIFFERENTIAL DIAGNOSIS:   Obstruction, UTI, perforation    DIAGNOSTIC RESULTS     EKG: All EKG's are interpreted by the Emergency Department Physician who eithersigns or Co-signs this chart in the absence of a cardiologist.       EKG Dizziness (Preliminary result)   Component (Lab Inquiry)  Collection Time Result Time Ventricular Rate Atrial Rate P-R Interval QRS Duration Q-T Interval   01/20/21 23:46:15 01/20/21 23:56:21 70 70 102 78 430       Collection Time Result Time QTc Calculation (Bazett) P Axis R Axis T Axis   01/20/21 23:46:15 01/20/21 23:56:21 464 32 118 154         Preliminary result                Narrative:    Sinus rhythm with short VT   Low voltage QRS, consider pulmonary disease, pericardial effusion, or normal variant   Left posterior fascicular block   T wave abnormality, consider anterior ischemia   Prolonged QT interval or tu fusion, consider myocardial disease, electrolyte imbalance, or drug effects   Abnormal ECG   When compared with ECG of 27-MAY-2016 10:36,   Nonspecific T wave abnormality has replaced inverted T waves in Inferior leads   Nonspecific T wave abnormality is worse in Lateral leads                  RADIOLOGY: non-plainfilm images(s) such as CT, Ultrasound and MRI are read by the radiologist.  Plain radiographic images are visualized and preliminarily interpreted by the emergency physician unless otherwise stated below. CT ABDOMEN PELVIS WO CONTRAST   Final Result   Impression:      Moderate to severe partial small bowel obstruction right mid abdomen. Spiral configuration to the mesentery suggest internal hernia as a cause. This document has been electronically signed by: Los Hutton MD on    01/20/2021 10:48 PM      All CT scans at this facility use dose modulation, iterative    reconstruction, and/or weight-based   dosing when appropriate to reduce radiation dose to as low as reasonably    achievable. XR CHEST PORTABLE   Final Result   Impression:      Possible bilateral perihilar consolidation. Recommend PA and lateral views without bra.       This document has been electronically signed by: Los Hutton MD on    01/20/2021 09:10 PM         XR ABDOMEN (KUB) (SINGLE AP VIEW)    (Results Pending)         LABS:   Labs Reviewed   CBC WITH AUTO DIFFERENTIAL - Abnormal; Notable for the following components:       Result Value    WBC 15.2 (*)     Hemoglobin 16.5 (*)     Hematocrit 49.5 (*)     Segs Absolute 13.5 (*)     Immature Grans (Abs) 0.09 (*)     All other components within normal limits   COMPREHENSIVE METABOLIC PANEL - Abnormal; Notable for the following components:    Glucose 190 (*)     Potassium 3.1 (*)     CO2 21 (*)     Total Protein 8.7 (*)     All other components within normal limits   URINE WITH REFLEXED MICRO - Abnormal; Notable for the following components:    Ketones, Urine TRACE (*)     Protein,  (*)     Color, UA DK YELLOW (*)     All other components within normal limits   GLOMERULAR FILTRATION RATE, ESTIMATED - Abnormal; Notable for the following components:    Est, Glom Filt Rate 73 (*)     All other components within normal limits   BASIC METABOLIC PANEL W/ REFLEX TO MG FOR LOW K - Abnormal; Notable for the following components:    Potassium reflex Magnesium 3.3 (*)     CO2 22 (*)     Glucose 137 (*)     All other components within normal limits   CBC - Abnormal; Notable for the following components:    WBC 12.4 (*)     All other components within normal limits   GLOMERULAR FILTRATION RATE, ESTIMATED - Abnormal; Notable for the following components:    Est, Glom Filt Rate 73 (*)     All other components within normal limits   LIPASE   HCG, SERUM, QUALITATIVE   ANION GAP   OSMOLALITY   MAGNESIUM   PROCALCITONIN   COVID-19   HEMOGLOBIN A1C   ANION GAP   MAGNESIUM   POCT GLUCOSE   POCT GLUCOSE   POCT GLUCOSE       EMERGENCY DEPARTMENT COURSE:   Vitals:    Vitals:    01/20/21 2232 01/20/21 2337 01/21/21 0100 01/21/21 0347   BP: 122/89 124/74 118/78 101/73   Pulse: 68 77 59 66   Resp: 20 20 22 18   Temp:   97.5 °F (36.4 °C) 97.6 °F (36.4 °C)   TempSrc:   Oral Oral   SpO2: 94% 98% 98% 97%   Weight:   154 lb 8 oz (70.1 kg)    Height:   5' 5\" (1.651 m)          MDM                         MDM    Seen evaluate in the emergency room for nausea and attempts of vomiting. Patient has had Nissen fundoplication she is only getting up small amounts of spit. Appropriate labs and imaging are ordered and reviewed. Labs are overall reassuring. Patient is hydrated with some IV fluids. She is given some Zofran for nausea. CT scan shows a severe partial small bowel obstruction. Also shows an internal hernia. Reviewed findings with patient's mother. Discussed the case with Dr. Kiera Garsia. He advises to call his PA and they will admit under Dr. Susana Mcgrath. Davian Dove PA-C came down evaluated the patient.   She is admitted in fair condition    Medications   iopamidol (ISOVUE-370) 76 % injection 80 mL (80 mLs Intravenous Not Given 1/20/21 2216)   sodium chloride flush 0.9 % injection 10 mL (has no administration in time range)   sodium chloride flush 0.9 % injection 10 mL (has no administration in time range)   ondansetron (ZOFRAN-ODT) disintegrating tablet 4 mg (has no administration in time range)     Or   ondansetron (ZOFRAN) injection 4 mg (has no administration in time range)   0.9 % sodium chloride infusion ( Intravenous New Bag 1/21/21 0225)   morphine (PF) injection 2 mg (2 mg Intravenous Given 1/21/21 0225)     Or   morphine injection 4 mg ( Intravenous See Alternative 1/21/21 0225)   famotidine (PEPCID) injection 20 mg (20 mg Intravenous Given 1/21/21 0225)   insulin lispro (HUMALOG) injection vial 0-6 Units (has no administration in time range)   insulin lispro (HUMALOG) injection vial 0-3 Units (0 Units Subcutaneous Not Given 1/21/21 0239)   glucose (GLUTOSE) 40 % oral gel 15 g (has no administration in time range)   dextrose 50 % IV solution (has no administration in time range)   glucagon (rDNA) injection 1 mg (has no administration in time range)   dextrose 5 % solution (has no administration in time range)   potassium chloride (KLOR-CON M) extended release tablet 40 mEq ( Oral See Alternative 1/21/21 5206)     Or   potassium bicarb-citric acid (EFFER-K) effervescent tablet 40 mEq ( Oral See Alternative 1/21/21 7712)     Or   potassium chloride 10 mEq/100 mL IVPB (Peripheral Line) (10 mEq Intravenous New Bag 1/21/21 0642)   budesonide (PULMICORT) nebulizer suspension 500 mcg (has no administration in time range)   albuterol (PROVENTIL) nebulizer solution 2.5 mg (has no administration in time range)   metoprolol tartrate (LOPRESSOR) tablet 25 mg (has no administration in time range)   midodrine (PROAMATINE) tablet 5 mg (has no administration in time range)   pravastatin (PRAVACHOL) tablet 20 mg (has no administration in time range)   ALPRAZolam (XANAX) tablet 1 mg (1 mg Oral Given 1/21/21 0356)   QUEtiapine (SEROQUEL) tablet 400 mg (400 mg Oral Given 1/21/21 0356)   sertraline (ZOLOFT) tablet 150 mg (has no administration in time range)   ondansetron (ZOFRAN) injection 4 mg (4 mg Intravenous Given 1/20/21 2100)   0.9 % sodium chloride bolus (0 mLs

## 2021-01-21 NOTE — PROGRESS NOTES
1237 ARRIVED IN PACU FROM O R AFTER GENERAL ANESTHESIA. SEE FLOW SHEET   1300 FENTANYL FOR POST OP PAIN SEE MAR  1330 SAYS PAIN EASED AND TOLERABLE. DOZES. 1340 REPORT CALLED TO [facility].  WAITING FOR TRANSPORT   1345 DOZES . MOTHER GOING UP TO ROOM TO WAIT FOR PT.   1407 TO ROOM PER TRANSPORT WITH O2,.

## 2021-01-21 NOTE — ED NOTES
Pt and pt family updated on POC. Pt respirations easy and unlabored. Pt denies any further needs at this time. Will continue to monitor.       Tangela Meade RN  01/20/21 5950

## 2021-01-21 NOTE — CARE COORDINATION
1/21/21, 1:55 PM EST    DISCHARGE PLANNING EVALUATION     discharge needs discussed with care manager. Patient and family are off the unit, with patient in surgery today. Jonelle Dey lives with mom, mom has shared she would like to have [facility] for nursing, if in network with insurance. Call made to [facility]. [facility] accepts [facility], notes that they may have available staff for nursing, but not for aids. Will discuss with mom and complete referral, if agreeable.

## 2021-01-21 NOTE — ED NOTES
Urine sample sent via straight cath. Radames Riedel NP at bedside. Pt mom states that pt has complaining of burning upon urination as well as a smell of odor.       Merlyn Bull RN  01/20/21 2051

## 2021-01-21 NOTE — CARE COORDINATION
DISASTER CHARTING    1/21/21, 7:48 AM EST    DISCHARGE ONGOING EVALUATION:     Rojelio Ribeiro day: 0  Location: -16/839-E Reason for admit: Partial small bowel obstruction ([facility] [K56.600]   Barriers to Discharge: Admitted through ED with abd pain and nausea. Afebrile, room air. Potassium 3.3. WBC 12.4. KUB today. NG tube to LIWS. Pain control. Nausea control. Scheduled for surgery today. CXR - possible bilateral perihilar consolidation. CT abd/pelvis - Moderate to severe partial small bowel obstruction right mid abdomen. Spiral configuration to the mesentery suggest internal hernia as a cause. PCP: KAYLI Alvarez - CNP  Readmission Risk Score: 12%  Patient Goals/Plan/Treatment Preferences: Spoke with pt and mother. Pt is MRDD, she lives at home with mom and family. She can care for herself, but does not work. Pt does not use any DME at home at this time. Discussed HH with mom, mom works at [employer] and would like [facility] at discharge. Wants to have [facility] if insurance will cover. SW consulted.

## 2021-01-21 NOTE — PROGRESS NOTES
Radiology called and said to advance the N/G tube 5 cm. N/G was advanced 5 cm without difficulty. Pt tolerated fair. Mom at bedside. Informed Primary RN La Nena Kim of x ray results.

## 2021-01-21 NOTE — ANESTHESIA POSTPROCEDURE EVALUATION
Department of Anesthesiology  Postprocedure Note    Patient: Bradley Chester  MRN: [id number]  YOB: 1990  Date of evaluation: 1/21/2021  Time:  1:29 PM     Procedure Summary     Date: 01/21/21 Room / Location: [address] [place], [place] [address]UD [facility] OR    Anesthesia Start: 1128 Anesthesia Stop: 1244    Procedure: EXPLORATORY LAPAROTOMY, LYSIS OF ADHESIONS (N/A Abdomen) Diagnosis: (BOWEL OBSTRUCTION)    Surgeons: Genny Null DO Responsible Provider: Osorio Escoto DO    Anesthesia Type: general ASA Status: 3          Anesthesia Type: general    Meet Phase I: Meet Score: 8    Meet Phase II:      Last vitals: Reviewed and per EMR flowsheets.        Anesthesia Post Evaluation    Patient location during evaluation: PACU  Patient participation: complete - patient participated  Level of consciousness: awake  Airway patency: patent  Nausea & Vomiting: no nausea and no vomiting  Complications: no  Cardiovascular status: hemodynamically stable  Respiratory status: acceptable  Hydration status: stable

## 2021-01-21 NOTE — OP NOTE
[facility]  RECORD OF OPERATION  PATIENT NAME: [facility] RECORD NO. [id number]  DATE: 1/21/2021  SURGEON: Melecio Zee. TEO Crowder [address] [facility]  PRIMARY CARE PHYSICIAN: KAYLI Nelson CNP     PREOPERATIVE DIAGNOSIS:  Small bowel obstruction  POSTOPERATIVE DIAGNOSIS:  Same  PROCEDURE PERFORMED:  Exploratory laparotomy, adhesiolysis  SURGEON:  Dr. Melecio Crowder  ANESTHESIA:  General with local.  ESTIMATED BLOOD LOSS:  3 ml. SPECIMENS:  None  COMPLICATIONS:  None immediately appreciated. DISCUSSION:  BODØ is a 31y.o.-year-old female who presented to the hospital with small bowel obstruction that has failed medical management. After history and physical examination was performed, potential diagnostic and therapeutic modalities were discussed with the patient. Operative and non operative management were discussed. Risks, complications and benefits were reviewed. She was given then opportunity to ask questions. Once answered, informed was obtained. She was brought to the operating room on 1/21/2021 for the procedure. OPERATIVE FINDINGS:  At the time of exploration, BODØ was found to have obstruction at the level of jejunum caused by adhesions. The bowel was viable and  did not require resection. PROCEDURE:  The patient was brought to the operating room, placed in supine position, placed under continuous cardiac telemetry, blood pressure and pulse oximetry monitoring and placed under general anesthesia by the Anesthesia Department. The anterior abdominal wall was prepped and draped in the sterile fashion. A vertical midline incision was made with a #10 scalpel blade and carried down to the subcutaneous tissues. Subcutaneous tissue dissection carried out with electrocautery down through the linea alba allowing safe entry into the abdomen. Upon entering the abdomen, a small amount of ascitic fluid was encountered and removed via suction.  The proximal small bowel was dilated and the colon was relatively decompressed. The small bowel was run from the ligament of Trietz down to distal jejunum where the obstruction was identified. Adhesiolysis performed with sharp dissection freeing the small bowel from the surrounding tissues. The obstructed segment was not noted to viable. Once freed, luminal patency was felt to be present. The remaining small bowel ran to the cecum without any additional areas of obstruction. A serosal disruption was closed with 3-0 Vicryl suture. The abdomen was copiously irrigated and excess irrigation fluid removed via suction. All packs and instruments were removed from the patients abdomen. The fascia approximated with looped PDS suture in running fashion. The deep tissues were approximated using 2-0 Vicryl suture. Deep dermis approximated using 3-0 Vicryl suture. Skin closed with staples after infiltration with local aesthetic. The wound was then cleansed, sterile dressings were applied. The patient brought out of anesthesia and transferred to PACU in stable and satisfactory condition. No immediate complication evident. All sponge, instrument and needle counts were correct at the completion of the procedure. Postoperative findings were discussed with the patient's family. She will be admitted to the hospital with discharge pending her progression.        Electronically signed by Marilyn Weber DO on 1/21/2021 at 12:38 PM

## 2021-01-21 NOTE — H&P
[address] [facility] History & Physical - Elizabeth Dove PA-C  On behalf of Dr. Gurjit Cervantes    Patient seen and examined independently by me. The below note has been modified by me to reflect current findings and plans. Labs, cultures, and radiographs where available were reviewed. I discussed patient concerns with the patient's nurse and instructions were given. Please see our orders for the updated patient care plan. Pt with apparent closed loop obstruction. Distended, high pitched bowel sounds. In this setting the risk of ischemic bowel is elevated. Options discussed with pt mother. Plan for exploration later today. Potential diagnostic and therapeutic modalities were discussed with the patient including surgical and nonsurgical options. The risks, complications and benefits of the options were discussed. Complications including, but not limited to, bleeding, wound infection, anastomotic leak, blood clots, bowel obstruction, other organ injury, injury to surrounding structures, intra-abdominal abscess, and need for colostomy were reviewed. The patient was given an opportunity to ask questions. Once answered, the patient is agreeable to proceed with surgery. Electronically signed by Gale Cline DO on 1/21/2021 at 10:50 AM    Pt Name: Larisa Victoria  MRN: [id number]  YOB: 1990  Date of evaluation: 1/20/2021  Primary Care Physician: KAYLI Bynum - CNP  Patient evaluated at the request of  Jenae Arguello CNP  Reason for evaluation: SBO  IMPRESSIONS   1. Partial small bowel obstruction  2. Nausea  3. Leukocytosis  4. Acute hyperglycemia  5. Hypokalemia  6. Bilateral perihilar consolidatons  7.  has a past medical history of Anemia, Asthma, Cerebral palsy ([address] COPD (chronic obstructive pulmonary disease) ([address] GERD (gastroesophageal reflux disease), Heart attack ([address] Mental retardation, and Schizophrenia simplex ([address] PLANS   1.  NPO  2. NG tube to LIWS  3. IV fluids  4. Pain control  5. Antiemetics  6. KUB in AM  7. Replace potassium per protocol  8. Hemoglobin A1C in AM  9. Low dose SSI  10. Procalcitonin   11. Repeat labs in AM  12. Home medications restarted  13. EQV-[id number]  SUBJECTIVE   History of Chief Complaint:    Carey Hernandez is a 31 y. o.female with a history of asthma, schizophrenia, cerebral palsy, MRDD, heart attack, COPD, and hyperlipidemia who presents with abdominal pain. Patient reported periumbilical abdominal pain started this morning and has progressively worsened throughout the day. Nothing makes the pain better or worse. Patient also endorsed nausea but mother at bedside denies patient can vomit due to history of fundoplication and has been \"spitting up\" throughout the day. Patient also endorsed having chills, dizziness, and urinary frequency and dysuria. Patient denies any headaches, chest pain, shortness of breath, and constipation. Patient with history of gastric fundoplication, laparotomy, and abdominal exploration surgery with lysis of adhesions. On exam patient's abdomen was soft, nondistended, with tenderness palpation noted over periumbilical area and left lower quadrant. No guarding or peritoneal signs. CT abdomen pelvis revealed mild to severe partial small bowel obstruction in the right mid gut with spiral configuration to the mesentery suggest internal hernia as a cause. Labs reviewed. Leukocytosis noted at 15.2.  UA appears negative for any UTI. Blood sugar up at 190, no history of diabetes. Potassium 3.1. Chest x-ray reviewed revealed bilateral perihilar consolidations. Case discussed with hospitalist team. Patient placed on low dose SSI and A1C ordered for tomorrow AM. Discussed concern for pneumonia with hospitalist team. Procal ordered, pending results, will consult hospitalist as needed. Plan for patient to be admitted under general surgery for bowel rest, IV fluids, pain control and NG tube.  Case discussed with general surgeon on call, Dr. Jaida Barrios. Past Medical History   has a past medical history of Anemia, Asthma, Cerebral palsy ([facility] [address] COPD (chronic obstructive pulmonary disease) ([facility] [address] GERD (gastroesophageal reflux disease), Heart attack ([facility] [address] Mental retardation, and Schizophrenia simplex ([facility] [address] Past Surgical History   has a past surgical history that includes Gastric fundoplication; laparotomy (3/25/16); Colon surgery; Abdominal exploration surgery (05/27/2016); Eye surgery (1996); Colonoscopy; and Colonoscopy (N/A, 4/25/2019). Medications  Prior to Admission medications    Medication Sig Start Date End Date Taking? Authorizing Provider   midodrine (PROAMATINE) 5 MG tablet take 1 tablet by mouth twice a day 9/29/20   KAYLI Zapata CNP   metoprolol tartrate (LOPRESSOR) 25 MG tablet Take 1 tablet by mouth 2 times daily 9/29/20   KAYLI Zapata CNP   pravastatin (PRAVACHOL) 20 MG tablet take 1 tablet by mouth once daily at bedtime 9/13/20   Historical Provider, MD   benzonatate (TESSALON) 200 MG capsule Take 1 capsule by mouth 3 times daily as needed for Cough (Swallow whole, do not chew) Swallow whole, do not chew 5/23/20   Soraida Daley MD   albuterol (PROVENTIL) (2.5 MG/3ML) 0.083% nebulizer solution inhale contents of 1 vial in nebulizer every 6 hours if needed FOR WHEEZING OR SHORTNESS OF BREATH 12/10/19   KAYLI Rogers CNP   budesonide (PULMICORT) 0.5 MG/2ML nebulizer suspension inhale contents of 1 vial in nebulizer twice a day 12/10/19   KAYLI Perea CNP   QUEtiapine (SEROQUEL) 300 MG tablet Take 1 tablet by mouth daily  8/1/16   Historical Provider, MD   polyethylene glycol (GLYCOLAX) powder Dispense 238 Gram Bottle.   Use as Directed 4/3/19   KAYLI Mckeon CNP   etonogestrel (NEXPLANON) 68 MG implant 68 mg by Subdermal route once    Historical Provider, MD   omeprazole (PRILOSEC) 20 MG delayed release capsule Take 1 capsule by mouth every morning (before breakfast) 18  Delwyn Sicard, APRN - CNP   Sertraline HCl (ZOLOFT PO) Take 150 mg by mouth every morning     Historical Provider, MD   ALPRAZolam (XANAX PO) Take 1 mg by mouth 3 times daily     Historical Provider, MD    Scheduled Meds:  Continuous Infusions:  PRN Meds:. iopamidol  Allergies  has No Known Allergies. Family History  family history includes Diabetes in her mother; High Blood Pressure in her mother. Social History   reports that she is a non-smoker but has been exposed to tobacco smoke. She has never used smokeless tobacco. She reports that she does not drink alcohol or use drugs. Review of Systems:  General Admits chills. Denies any fever  HEENT Admits lightheadedness but denies any headaches  Resp Admits cough. Denies any shortness of breath or wheezing  Cardiac Denies any chest pain, palpitations  GI Admits abdominal pain, nausea    Admit frequency and urgency  Heme Denies bruising or bleeding easily and blood thinners  Endocrine Denies any history of diabetes or thyroid disease  Neuro Denies any focal motor or sensory deficits  OBJECTIVE   CURRENT VITALS:  blood pressure is 124/74 and her pulse is 77. Her respiration is 20 and oxygen saturation is 98%. There is no height or weight on file to calculate BMI. Temperature Range (24h):  No data recorded  BP Range (91S): Systolic (40NQF), SBE:957 , Min:114 , ZBQ:034     Diastolic (20SFU), RGP:72, Min:74, Max:97    Pulse Range (24h): Pulse  Av  Min: 64  Max: 77  Respiration Range (24h): Resp  Av  Min: 18  Max: 22  Current Pulse Ox (24h):  SpO2: 98 %  Pulse Ox Range (24h):  SpO2  Av.2 %  Min: 94 %  Max: 98 %  Oxygen Amount and Delivery:    CONSTITUTIONAL: Alert and oriented, no acute distress and cooperative to examination with proper mood and affect. MRDD  SKIN: No rashes or lesions. HEENT: Head is normocephalic, atraumatic.  EOMI, PERRL  NECK: Supple, symmetrical, trachea midline, no adenopathy  CHEST/LUNGS: Lungs sounds diminished with bilateral rhonchi. No increased work of breathing or respiratory distress  CARDIOVASCULAR: Heart sounds are normal.  Regular rate and rhythm without murmur, gallop or rub. Normal S1 and S2. Distal pulses intact. ABDOMEN: Soft, nondistened, with periumbilical and left lower quadrant tenderness to palpation. No guarding or peritoneal signs. Bowel sounds normal active. Large midline incisional scar and transverse incisional scar in LUQ. RECTAL: deferred, not clinically indicated  NEUROLOGIC: There are no focalizing motor or sensory deficits. CN II-XII are grossly intact. EXTREMITIES: no cyanosis, no clubbing and no edema. LABS     Recent Labs     01/20/21 2000 01/20/21 2045 01/20/21 2111   WBC 15.2*  --   --    HGB 16.5*  --   --    HCT 49.5*  --   --      --   --    NA  --   --  135   K  --   --  3.1*   CL  --   --  99   CO2  --   --  21*   BUN  --   --  15   CREATININE  --   --  0.9   CALCIUM  --   --  10.0   AST  --   --  15   ALT  --   --  13   BILITOT  --   --  0.5   LIPASE  --   --  33.4   NITRU  --  NEGATIVE  --    COLORU  --  DK YELLOW*  --    BACTERIA  --  NONE SEEN  --      RADIOLOGY     CT ABDOMEN PELVIS WO CONTRAST   Final Result   Impression:      Moderate to severe partial small bowel obstruction right mid abdomen. Spiral configuration to the mesentery suggest internal hernia as a cause. This document has been electronically signed by: Lisa Guillermo MD on    01/20/2021 10:48 PM      All CT scans at this facility use dose modulation, iterative    reconstruction, and/or weight-based   dosing when appropriate to reduce radiation dose to as low as reasonably    achievable. XR CHEST PORTABLE   Final Result   Impression:      Possible bilateral perihilar consolidation. Recommend PA and lateral views without bra.       This document has been electronically signed by: Lisa Guillermo MD on    01/20/2021 09:10 PM

## 2021-01-21 NOTE — PROGRESS NOTES
Pt from recovery call light in reach bed alarm on n/g to LIWS in rt nares, abd dressing clean dry and intact, pt taking in ice chips tolerating well.  Mom at the bedside

## 2021-01-22 LAB
ANION GAP SERPL CALCULATED.3IONS-SCNC: 9 MEQ/L (ref 8–16)
BUN BLDV-MCNC: 10 MG/DL (ref 7–22)
CALCIUM SERPL-MCNC: 8.1 MG/DL (ref 8.5–10.5)
CHLORIDE BLD-SCNC: 107 MEQ/L (ref 98–111)
CO2: 21 MEQ/L (ref 23–33)
CREAT SERPL-MCNC: 0.6 MG/DL (ref 0.4–1.2)
ERYTHROCYTE [DISTWIDTH] IN BLOOD BY AUTOMATED COUNT: 13.2 % (ref 11.5–14.5)
ERYTHROCYTE [DISTWIDTH] IN BLOOD BY AUTOMATED COUNT: 46.2 FL (ref 35–45)
GFR SERPL CREATININE-BSD FRML MDRD: > 90 ML/MIN/1.73M2
GLUCOSE BLD-MCNC: 109 MG/DL (ref 70–108)
GLUCOSE BLD-MCNC: 118 MG/DL (ref 70–108)
HCT VFR BLD CALC: 38.8 % (ref 37–47)
HEMOGLOBIN: 12.7 GM/DL (ref 12–16)
MCH RBC QN AUTO: 31.4 PG (ref 26–33)
MCHC RBC AUTO-ENTMCNC: 32.7 GM/DL (ref 32.2–35.5)
MCV RBC AUTO: 95.8 FL (ref 81–99)
PLATELET # BLD: 192 THOU/MM3 (ref 130–400)
PMV BLD AUTO: 11.3 FL (ref 9.4–12.4)
POTASSIUM SERPL-SCNC: 3.3 MEQ/L (ref 3.5–5.2)
RBC # BLD: 4.05 MILL/MM3 (ref 4.2–5.4)
SODIUM BLD-SCNC: 137 MEQ/L (ref 135–145)
WBC # BLD: 9.9 THOU/MM3 (ref 4.8–10.8)

## 2021-01-22 PROCEDURE — 2500000003 HC RX 250 WO HCPCS: Performed by: PHYSICIAN ASSISTANT

## 2021-01-22 PROCEDURE — 6360000002 HC RX W HCPCS: Performed by: SURGERY

## 2021-01-22 PROCEDURE — 82948 REAGENT STRIP/BLOOD GLUCOSE: CPT

## 2021-01-22 PROCEDURE — 94640 AIRWAY INHALATION TREATMENT: CPT

## 2021-01-22 PROCEDURE — 2580000003 HC RX 258: Performed by: SURGERY

## 2021-01-22 PROCEDURE — 80048 BASIC METABOLIC PNL TOTAL CA: CPT

## 2021-01-22 PROCEDURE — 36415 COLL VENOUS BLD VENIPUNCTURE: CPT

## 2021-01-22 PROCEDURE — 94760 N-INVAS EAR/PLS OXIMETRY 1: CPT

## 2021-01-22 PROCEDURE — 99024 POSTOP FOLLOW-UP VISIT: CPT | Performed by: SURGERY

## 2021-01-22 PROCEDURE — 6370000000 HC RX 637 (ALT 250 FOR IP): Performed by: SURGERY

## 2021-01-22 PROCEDURE — 85027 COMPLETE CBC AUTOMATED: CPT

## 2021-01-22 PROCEDURE — 1200000000 HC SEMI PRIVATE

## 2021-01-22 RX ADMIN — SODIUM CHLORIDE, PRESERVATIVE FREE 10 ML: 5 INJECTION INTRAVENOUS at 07:37

## 2021-01-22 RX ADMIN — ALBUTEROL SULFATE 2.5 MG: 2.5 SOLUTION RESPIRATORY (INHALATION) at 13:54

## 2021-01-22 RX ADMIN — MORPHINE SULFATE 2 MG: 2 INJECTION, SOLUTION INTRAMUSCULAR; INTRAVENOUS at 21:01

## 2021-01-22 RX ADMIN — PRAVASTATIN SODIUM 20 MG: 20 TABLET ORAL at 21:01

## 2021-01-22 RX ADMIN — FAMOTIDINE 20 MG: 10 INJECTION, SOLUTION INTRAVENOUS at 21:01

## 2021-01-22 RX ADMIN — BUDESONIDE 500 MCG: 0.5 INHALANT RESPIRATORY (INHALATION) at 21:07

## 2021-01-22 RX ADMIN — MORPHINE SULFATE 2 MG: 2 INJECTION, SOLUTION INTRAMUSCULAR; INTRAVENOUS at 18:26

## 2021-01-22 RX ADMIN — QUETIAPINE FUMARATE 400 MG: 400 TABLET ORAL at 21:01

## 2021-01-22 RX ADMIN — MORPHINE SULFATE 2 MG: 2 INJECTION, SOLUTION INTRAMUSCULAR; INTRAVENOUS at 04:57

## 2021-01-22 RX ADMIN — BUDESONIDE 500 MCG: 0.5 INHALANT RESPIRATORY (INHALATION) at 07:35

## 2021-01-22 RX ADMIN — MORPHINE SULFATE 2 MG: 2 INJECTION, SOLUTION INTRAMUSCULAR; INTRAVENOUS at 08:49

## 2021-01-22 RX ADMIN — SODIUM CHLORIDE: 9 INJECTION, SOLUTION INTRAVENOUS at 13:55

## 2021-01-22 RX ADMIN — POTASSIUM BICARBONATE 40 MEQ: 782 TABLET, EFFERVESCENT ORAL at 05:59

## 2021-01-22 RX ADMIN — MORPHINE SULFATE 2 MG: 2 INJECTION, SOLUTION INTRAMUSCULAR; INTRAVENOUS at 15:07

## 2021-01-22 RX ADMIN — CEFOXITIN SODIUM 2000 MG: 2 POWDER, FOR SOLUTION INTRAVENOUS at 05:36

## 2021-01-22 RX ADMIN — FAMOTIDINE 20 MG: 10 INJECTION, SOLUTION INTRAVENOUS at 07:37

## 2021-01-22 RX ADMIN — ENOXAPARIN SODIUM 40 MG: 40 INJECTION SUBCUTANEOUS at 07:37

## 2021-01-22 ASSESSMENT — PAIN DESCRIPTION - LOCATION: LOCATION: ABDOMEN

## 2021-01-22 ASSESSMENT — PAIN DESCRIPTION - PAIN TYPE: TYPE: SURGICAL PAIN

## 2021-01-22 ASSESSMENT — PAIN SCALES - WONG BAKER
WONGBAKER_NUMERICALRESPONSE: 2
WONGBAKER_NUMERICALRESPONSE: 4
WONGBAKER_NUMERICALRESPONSE: 2
WONGBAKER_NUMERICALRESPONSE: 2

## 2021-01-22 ASSESSMENT — PAIN SCALES - GENERAL
PAINLEVEL_OUTOF10: 6
PAINLEVEL_OUTOF10: 5
PAINLEVEL_OUTOF10: 6
PAINLEVEL_OUTOF10: 5
PAINLEVEL_OUTOF10: 5
PAINLEVEL_OUTOF10: 6

## 2021-01-22 ASSESSMENT — PAIN DESCRIPTION - ORIENTATION
ORIENTATION: LOWER;MID
ORIENTATION: MID

## 2021-01-22 NOTE — PROGRESS NOTES
DO [facility] GENERAL SURGERY  General Surgery Postoperative Progress Note    Pt Name: Gregory Martinez  Medical Record Number: [id number]  Date of Birth 1990   Today's Date: 1/22/2021  ASSESSMENT   1. POD # 1 release of SBO due to closed loop obstruction  2. Cerebral palsy   3. Hypokalemia   has a past medical history of Anemia, Asthma, Cerebral palsy ([facility] [address] COPD (chronic obstructive pulmonary disease) ([facility] [address] GERD (gastroesophageal reflux disease), Heart attack ([facility] [address] Hyperlipidemia, Mental retardation, and Schizophrenia simplex ([facility] [address] PLAN   1. Analgesics and antiemetics as needed  2. IV hydration  3. Clamp NG  4. clear diet as tolerated  5. Increase activity  6. Lovenox for DVT prophylaxis  7. Potassium replacement as needed  SUBJECTIVE   Florentino Garrison is doing well. She denies any nausea or vomiting, has not passed flatus or had a bowel movement. She is tolerating a DIET CLEAR LIQUID;. Her pain is well controlled on current medications. She has been ambulating in the room.    CURRENT MEDICATIONS   Scheduled Meds:   famotidine (PEPCID) injection  20 mg Intravenous BID    insulin lispro  0-6 Units Subcutaneous TID WC    insulin lispro  0-3 Units Subcutaneous Nightly    budesonide  0.5 mg Nebulization BID    metoprolol tartrate  25 mg Oral BID    midodrine  5 mg Oral BID WC    pravastatin  20 mg Oral Nightly    QUEtiapine  400 mg Oral Nightly    sertraline  150 mg Oral QAM    sodium chloride flush  10 mL Intravenous 2 times per day    enoxaparin  40 mg Subcutaneous Daily     Continuous Infusions:   dextrose      sodium chloride 75 mL/hr at 01/21/21 2346     PRN Meds:.ondansetron **OR** ondansetron, morphine **OR** morphine, glucose, dextrose, glucagon (rDNA), dextrose, potassium chloride **OR** potassium alternative oral replacement **OR** potassium chloride, albuterol, ALPRAZolam, sodium chloride flush, iopamidol  OBJECTIVE   CURRENT VITALS:  height is 5' 5\" (1.651 m) and weight is 154 lb 8 oz (70.1 kg). Her oral temperature is 97.8 °F (36.6 °C). Her blood pressure is 122/77 and her pulse is 106. Her respiration is 24 and oxygen saturation is 94%. Temperature Range (24h):Temp: 97.8 °F (36.6 °C) Temp  Av.3 °F (36.8 °C)  Min: 97 °F (36.1 °C)  Max: 98.9 °F (37.2 °C)  BP Range (95N): Systolic (73LTL), WBU:624 , Min:78 , AD     Diastolic (35FCH), UNS:80, Min:41, Max:84    Pulse Range (24h): Pulse  Av.5  Min: 76  Max: 106  Respiration Range (24h): Resp  Av.7  Min: 0  Max: 24  Current Pulse Ox (24h):  SpO2: 94 %  Pulse Ox Range (24h):  SpO2  Av.3 %  Min: 87 %  Max: 100 %  Oxygen Amount and Delivery: O2 Flow Rate (L/min): 2 L/min  Incentive Spirometry Tx:            GENERAL: alert, no distress  LUNGS: clear to ausculation, without wheezes, rales or rhonci  HEART: normal rate and regular rhythm  ABDOMEN: non-distended, soft, incisional tenderness, bowel sounds present in all 4 quadrants and no guarding or peritoneal signs  INCISION: healing well, no significant drainage, no significant erythema  EXTREMITY: no cyanosis, clubbing or edema  In: 1769 [P.O.:660; I.V.:1109]  Out: 1400 [Urine:1050]     Date 21 - 21   Shift 5535-4130 9058-4496 6474-8200 24 Hour Total   INTAKE   P.O.(mL/kg/hr) 460(0.8)   460   I. V.(mL/kg) 552(7.9)   552(7.9)   Shift Total(mL/kg) 4881(08.8)   7481(17.3)   OUTPUT   Urine(mL/kg/hr) 650(1.2)   650   Emesis/NG output(mL/kg) 50(0.7)   50(0.7)   Shift Total(mL/kg) 700(10)   700(10)   Weight (kg) 70.1 70.1 70.1 70.1     LABS     Recent Labs     21  2111 21  0414 21  0450   WBC 15.2*  --  12.4* 9.9   HGB 16.5*  --  15.0 12.7   HCT 49.5*  --  45.4 38.8     --  281 192   NA  --  135 138 137   K  --  3.1* 3.3* 3.3*   CL  --  99 104 107   CO2  --  21* 22* 21*   BUN  --  15 19 10   CREATININE  --  0.9 0.9 0.6   MG  --  1.9 1.8  --    CALCIUM  --  10.0 9.4 8.1*      No results for input(s): PTT, INR in the last 72 hours. Invalid input(s): PT  Recent Labs     01/20/21 2111   AST 15   ALT 13   BILITOT 0.5   LIPASE 33.4     No results for input(s): TROPONINT in the last 72 hours. RADIOLOGY     XR ABDOMEN (KUB) (SINGLE AP VIEW)   Final Result   Impression:   1. Increase in the small bowel distention consistent with distal small    bowel obstruction with paucity of gas in the colon   2. NG tube is at the level of the cardia of the stomach please advanced 5    cm      This document has been electronically signed by: Rosa Maria Bravo MD on    01/21/2021 08:25 AM         CT ABDOMEN PELVIS WO CONTRAST   Final Result   Impression:      Moderate to severe partial small bowel obstruction right mid abdomen. Spiral configuration to the mesentery suggest internal hernia as a cause. This document has been electronically signed by: Aurora Montiel MD on    01/20/2021 10:48 PM      All CT scans at this facility use dose modulation, iterative    reconstruction, and/or weight-based   dosing when appropriate to reduce radiation dose to as low as reasonably    achievable. XR CHEST PORTABLE   Final Result   Impression:      Possible bilateral perihilar consolidation. Recommend PA and lateral views without bra.       This document has been electronically signed by: Aurora Montiel MD on    01/20/2021 09:10 PM               Electronically signed by Heidi Moseley DO on 1/22/2021 at 9:01 AM

## 2021-01-22 NOTE — PROGRESS NOTES
Placed Abd.  Isacc Villeda on pt ambulated in the lawrence about 150 feet   Educated Mom and pt on IS use  Pt getting up to 200 pt forgetting and blowing out hard for pt to understand, but she is trying

## 2021-01-22 NOTE — CARE COORDINATION
1/22/21, 1:40 PM EST    DISCHARGE PLANNING EVALUATION      Spoke with [facility] to initiate referral.  Will need HH orders to complete referral.

## 2021-01-22 NOTE — PROGRESS NOTES
**This is a Medical/ PA/ APRN Student Note and is charted for educational purposes. The non-physician staff attested note is not to be used for billing purposes or to guide patient care. Please see the physician modifications/ attestation for treatment plan/suggestions. This note has been reviewed and feedback has been provided to the student. **  [facility]  On behalf of Dr. Cyndi Rodgers  Postoperative Progress Note  Pt Name: Cathy Jimenez Record Number: [id number]  Date of Birth 1990   Today's Date: 1/22/2021  ASSESSMENT   1. POD # 1 Exploratory laparotomy, adhesiolysis for SBO  2. Hypokalemia  3.  has a past medical history of Anemia, Asthma, Cerebral palsy ([address] COPD (chronic obstructive pulmonary disease) ([address] GERD (gastroesophageal reflux disease), Heart attack ([facility] [address] Hyperlipidemia, Mental retardation, and Schizophrenia simplex ([facility] [address] PLANS   1. Analgesics and antiemetics as needed  2. IV hydration  3. Clamp NG tube, clear liquid diet as tolerated  4. Potassium replacement per protocol  5. IS and respiratory therapy  6. Increase activity  7. Lovenox for DVT prophylaxis  Charlie Gilbert is doing okay. She admits to nausea or vomiting yesterday, states she is not nauseous today, has passed flatus but has not had a bowel movement. She is tolerating a DIET CLEAR LIQUID;. Her pain is well controlled on current medications. She has been OOB but not ambulating in the halls.   CURRENT MEDICATIONS   Scheduled Meds:   famotidine (PEPCID) injection  20 mg Intravenous BID    insulin lispro  0-6 Units Subcutaneous TID     insulin lispro  0-3 Units Subcutaneous Nightly    budesonide  0.5 mg Nebulization BID    metoprolol tartrate  25 mg Oral BID    midodrine  5 mg Oral BID WC    pravastatin  20 mg Oral Nightly    QUEtiapine  400 mg Oral Nightly    sertraline  150 mg Oral QAM    sodium chloride flush  10 mL Intravenous 2 times per day    enoxaparin 40 mg Subcutaneous Daily    cefOXitin  2,000 mg Intravenous Q6H     Continuous Infusions:   dextrose      sodium chloride 75 mL/hr at 21 2346     PRN Meds:.ondansetron **OR** ondansetron, morphine **OR** morphine, glucose, dextrose, glucagon (rDNA), dextrose, potassium chloride **OR** potassium alternative oral replacement **OR** potassium chloride, albuterol, ALPRAZolam, sodium chloride flush, iopamidol  OBJECTIVE   CURRENT VITALS:  height is 5' 5\" (1.651 m) and weight is 154 lb 8 oz (70.1 kg). Her oral temperature is 98.4 °F (36.9 °C). Her blood pressure is 104/63 and her pulse is 93. Her respiration is 16 and oxygen saturation is 93%. Body mass index is 25.71 kg/m². Temperature Range (24h):Temp: 98.4 °F (36.9 °C) Temp  Av.3 °F (36.8 °C)  Min: 97 °F (36.1 °C)  Max: 98.9 °F (37.2 °C)  BP Range (80F): Systolic (19YXB), AAJ:191 , Min:78 , OUJ:982     Diastolic (53CHR), VWC:74, Min:41, Max:84    Pulse Range (24h): Pulse  Av.4  Min: 76  Max: 103  Respiration Range (24h): Resp  Av.6  Min: 0  Max: 23  Current Pulse Ox (24h):  SpO2: 93 %  Pulse Ox Range (24h):  SpO2  Av.3 %  Min: 87 %  Max: 100 %  Oxygen Amount and Delivery: O2 Flow Rate (L/min): 2 L/min  Incentive Spirometry Tx:            GENERAL: alert, no distress  LUNGS: rhonchi present- bilaterally  HEART: normal rate and regular rhythm  ABDOMEN: distention present, soft, incisional tenderness, bowel sounds present in all 4 quadrants and no guarding or peritoneal signs  INCISION: healing well, no significant drainage, no significant erythema  EXTREMITY: no cyanosis, clubbing or edema  In: 4119 [P.O.:660; I.V.:3459]  Out: 1220 [Urine:810]     Date 21 0000 - 21 2359   Shift 3850-1645 9234-6335 5543-5201 24 Hour Total   INTAKE   P.O. 460   460   I. V.(mL/kg/hr) 552   552   Shift Total(mL/kg) 6346(31.7)   8023(58.9)   OUTPUT   Urine(mL/kg/hr) 400   400   Emesis/NG output 50   50   Shift Total(mL/kg) 450(6.4)   450(6.4)   Weight (kg) 70.1 70.1 70.1 70.1     LABS     Recent Labs     01/20/21 2000 01/20/21 2111 01/21/21  0414 01/22/21  0450   WBC 15.2*  --  12.4* 9.9   HGB 16.5*  --  15.0 12.7   HCT 49.5*  --  45.4 38.8     --  281 192   NA  --  135 138 137   K  --  3.1* 3.3* 3.3*   CL  --  99 104 107   CO2  --  21* 22* 21*   BUN  --  15 19 10   CREATININE  --  0.9 0.9 0.6   MG  --  1.9 1.8  --    CALCIUM  --  10.0 9.4 8.1*      No results for input(s): PTT, INR in the last 72 hours. Invalid input(s): PT  Recent Labs     01/20/21 2111   AST 15   ALT 13   BILITOT 0.5   LIPASE 33.4     No results for input(s): TROPONINT in the last 72 hours. RADIOLOGY     XR ABDOMEN (KUB) (SINGLE AP VIEW)   Final Result   Impression:   1. Increase in the small bowel distention consistent with distal small    bowel obstruction with paucity of gas in the colon   2. NG tube is at the level of the cardia of the stomach please advanced 5    cm      This document has been electronically signed by: Elva Hodgkins, MD on    01/21/2021 08:25 AM         CT ABDOMEN PELVIS WO CONTRAST   Final Result   Impression:      Moderate to severe partial small bowel obstruction right mid abdomen. Spiral configuration to the mesentery suggest internal hernia as a cause. This document has been electronically signed by: Khadijah Cohen MD on    01/20/2021 10:48 PM      All CT scans at this facility use dose modulation, iterative    reconstruction, and/or weight-based   dosing when appropriate to reduce radiation dose to as low as reasonably    achievable. XR CHEST PORTABLE   Final Result   Impression:      Possible bilateral perihilar consolidation. Recommend PA and lateral views without bra.       This document has been electronically signed by: Khadijah Cohen MD on    01/20/2021 09:10 PM             Electronically signed by Merlyn Gutierrez on 1/22/2021 at 6:00 AM

## 2021-01-23 LAB
ANION GAP SERPL CALCULATED.3IONS-SCNC: 11 MEQ/L (ref 8–16)
BUN BLDV-MCNC: 6 MG/DL (ref 7–22)
CALCIUM SERPL-MCNC: 8.6 MG/DL (ref 8.5–10.5)
CHLORIDE BLD-SCNC: 105 MEQ/L (ref 98–111)
CO2: 25 MEQ/L (ref 23–33)
CREAT SERPL-MCNC: 0.5 MG/DL (ref 0.4–1.2)
ERYTHROCYTE [DISTWIDTH] IN BLOOD BY AUTOMATED COUNT: 13.5 % (ref 11.5–14.5)
ERYTHROCYTE [DISTWIDTH] IN BLOOD BY AUTOMATED COUNT: 48.8 FL (ref 35–45)
GFR SERPL CREATININE-BSD FRML MDRD: > 90 ML/MIN/1.73M2
GLUCOSE BLD-MCNC: 109 MG/DL (ref 70–108)
GLUCOSE BLD-MCNC: 111 MG/DL (ref 70–108)
GLUCOSE BLD-MCNC: 95 MG/DL (ref 70–108)
GLUCOSE BLD-MCNC: 99 MG/DL (ref 70–108)
HCT VFR BLD CALC: 38.1 % (ref 37–47)
HEMOGLOBIN: 12.3 GM/DL (ref 12–16)
MCH RBC QN AUTO: 31.5 PG (ref 26–33)
MCHC RBC AUTO-ENTMCNC: 32.3 GM/DL (ref 32.2–35.5)
MCV RBC AUTO: 97.7 FL (ref 81–99)
PLATELET # BLD: 201 THOU/MM3 (ref 130–400)
PMV BLD AUTO: 11.6 FL (ref 9.4–12.4)
POTASSIUM SERPL-SCNC: 3.4 MEQ/L (ref 3.5–5.2)
RBC # BLD: 3.9 MILL/MM3 (ref 4.2–5.4)
SODIUM BLD-SCNC: 141 MEQ/L (ref 135–145)
WBC # BLD: 6.5 THOU/MM3 (ref 4.8–10.8)

## 2021-01-23 PROCEDURE — 36415 COLL VENOUS BLD VENIPUNCTURE: CPT

## 2021-01-23 PROCEDURE — 2500000003 HC RX 250 WO HCPCS: Performed by: PHYSICIAN ASSISTANT

## 2021-01-23 PROCEDURE — 94640 AIRWAY INHALATION TREATMENT: CPT

## 2021-01-23 PROCEDURE — 6360000002 HC RX W HCPCS: Performed by: SURGERY

## 2021-01-23 PROCEDURE — 1200000000 HC SEMI PRIVATE

## 2021-01-23 PROCEDURE — 2580000003 HC RX 258: Performed by: SURGERY

## 2021-01-23 PROCEDURE — 94760 N-INVAS EAR/PLS OXIMETRY 1: CPT

## 2021-01-23 PROCEDURE — 6370000000 HC RX 637 (ALT 250 FOR IP): Performed by: SURGERY

## 2021-01-23 PROCEDURE — 80048 BASIC METABOLIC PNL TOTAL CA: CPT

## 2021-01-23 PROCEDURE — 99024 POSTOP FOLLOW-UP VISIT: CPT | Performed by: SURGERY

## 2021-01-23 PROCEDURE — 2700000000 HC OXYGEN THERAPY PER DAY

## 2021-01-23 PROCEDURE — 85027 COMPLETE CBC AUTOMATED: CPT

## 2021-01-23 PROCEDURE — 82948 REAGENT STRIP/BLOOD GLUCOSE: CPT

## 2021-01-23 PROCEDURE — 51798 US URINE CAPACITY MEASURE: CPT

## 2021-01-23 RX ORDER — HYDROCODONE BITARTRATE AND ACETAMINOPHEN 5; 325 MG/1; MG/1
2 TABLET ORAL EVERY 4 HOURS PRN
Status: DISCONTINUED | OUTPATIENT
Start: 2021-01-23 | End: 2021-01-24 | Stop reason: HOSPADM

## 2021-01-23 RX ORDER — HYDROCODONE BITARTRATE AND ACETAMINOPHEN 5; 325 MG/1; MG/1
1 TABLET ORAL EVERY 4 HOURS PRN
Status: DISCONTINUED | OUTPATIENT
Start: 2021-01-23 | End: 2021-01-24 | Stop reason: HOSPADM

## 2021-01-23 RX ADMIN — SODIUM CHLORIDE, PRESERVATIVE FREE 10 ML: 5 INJECTION INTRAVENOUS at 08:32

## 2021-01-23 RX ADMIN — ENOXAPARIN SODIUM 40 MG: 40 INJECTION SUBCUTANEOUS at 08:41

## 2021-01-23 RX ADMIN — ALBUTEROL SULFATE 2.5 MG: 2.5 SOLUTION RESPIRATORY (INHALATION) at 11:58

## 2021-01-23 RX ADMIN — HYDROCODONE BITARTRATE AND ACETAMINOPHEN 1 TABLET: 5; 325 TABLET ORAL at 17:54

## 2021-01-23 RX ADMIN — ALPRAZOLAM 1 MG: 0.5 TABLET ORAL at 20:31

## 2021-01-23 RX ADMIN — SODIUM CHLORIDE: 9 INJECTION, SOLUTION INTRAVENOUS at 02:37

## 2021-01-23 RX ADMIN — FAMOTIDINE 20 MG: 10 INJECTION, SOLUTION INTRAVENOUS at 20:24

## 2021-01-23 RX ADMIN — HYDROCODONE BITARTRATE AND ACETAMINOPHEN 1 TABLET: 5; 325 TABLET ORAL at 22:38

## 2021-01-23 RX ADMIN — HYDROCODONE BITARTRATE AND ACETAMINOPHEN 1 TABLET: 5; 325 TABLET ORAL at 11:59

## 2021-01-23 RX ADMIN — PRAVASTATIN SODIUM 20 MG: 20 TABLET ORAL at 20:24

## 2021-01-23 RX ADMIN — FAMOTIDINE 20 MG: 10 INJECTION, SOLUTION INTRAVENOUS at 08:32

## 2021-01-23 RX ADMIN — MIDODRINE HYDROCHLORIDE 5 MG: 5 TABLET ORAL at 08:39

## 2021-01-23 RX ADMIN — QUETIAPINE FUMARATE 400 MG: 400 TABLET ORAL at 20:24

## 2021-01-23 RX ADMIN — SODIUM CHLORIDE: 9 INJECTION, SOLUTION INTRAVENOUS at 14:09

## 2021-01-23 RX ADMIN — ALPRAZOLAM 1 MG: 0.5 TABLET ORAL at 06:11

## 2021-01-23 RX ADMIN — POTASSIUM BICARBONATE 40 MEQ: 782 TABLET, EFFERVESCENT ORAL at 08:40

## 2021-01-23 RX ADMIN — MORPHINE SULFATE 2 MG: 2 INJECTION, SOLUTION INTRAMUSCULAR; INTRAVENOUS at 00:09

## 2021-01-23 RX ADMIN — BUDESONIDE 500 MCG: 0.5 INHALANT RESPIRATORY (INHALATION) at 08:10

## 2021-01-23 RX ADMIN — SERTRALINE 150 MG: 100 TABLET, FILM COATED ORAL at 08:39

## 2021-01-23 RX ADMIN — BUDESONIDE 500 MCG: 0.5 INHALANT RESPIRATORY (INHALATION) at 21:08

## 2021-01-23 RX ADMIN — MIDODRINE HYDROCHLORIDE 5 MG: 5 TABLET ORAL at 16:36

## 2021-01-23 ASSESSMENT — PAIN DESCRIPTION - LOCATION
LOCATION: ABDOMEN
LOCATION: ABDOMEN

## 2021-01-23 ASSESSMENT — PAIN SCALES - WONG BAKER
WONGBAKER_NUMERICALRESPONSE: 4
WONGBAKER_NUMERICALRESPONSE: 6

## 2021-01-23 ASSESSMENT — PAIN SCALES - GENERAL
PAINLEVEL_OUTOF10: 4
PAINLEVEL_OUTOF10: 6
PAINLEVEL_OUTOF10: 6
PAINLEVEL_OUTOF10: 5

## 2021-01-23 ASSESSMENT — PAIN DESCRIPTION - ORIENTATION: ORIENTATION: LOWER;MID

## 2021-01-23 ASSESSMENT — PAIN DESCRIPTION - PAIN TYPE
TYPE: SURGICAL PAIN
TYPE: SURGICAL PAIN

## 2021-01-23 NOTE — PROGRESS NOTES
[facility] GENERAL SURGERY  General Surgery Postoperative Progress Note    Pt Name: Shaw Young  Medical Record Number: [id number]  Date of Birth 1990   Today's Date: 1/23/2021  ASSESSMENT   1. POD # 2 release of SBO due to closed loop obstruction  2. Cerebral palsy   3. Hypokalemia   has a past medical history of Anemia, Asthma, Cerebral palsy ([facility] [address] COPD (chronic obstructive pulmonary disease) ([facility] [address] GERD (gastroesophageal reflux disease), Heart attack ([facility] [address] Hyperlipidemia, Mental retardation, and Schizophrenia simplex ([facility] [address] PLAN   1. Analgesics and antiemetics as needed  2. IV hydration  3. Tolerated NG tube clamped has stayed out  4. Advance diet as tolerated  5. Increase activity  6. Lovenox for DVT prophylaxis  7. Potassium replacement as needed  SUBJECTIVE   Christian Zhang is doing well. She denies any nausea or vomiting, has not passed flatus or had a bowel movement. Bowel sounds remain hypoactive. Mother requests diet advanced. She is tolerating a DIET LOW FIBER;. Her pain is well controlled on current medications. She has been ambulating in the room. CURRENT MEDICATIONS   Scheduled Meds:   famotidine (PEPCID) injection  20 mg Intravenous BID    insulin lispro  0-6 Units Subcutaneous TID WC    insulin lispro  0-3 Units Subcutaneous Nightly    budesonide  0.5 mg Nebulization BID    metoprolol tartrate  25 mg Oral BID    midodrine  5 mg Oral BID WC    pravastatin  20 mg Oral Nightly    QUEtiapine  400 mg Oral Nightly    sertraline  150 mg Oral QAM    sodium chloride flush  10 mL Intravenous 2 times per day    enoxaparin  40 mg Subcutaneous Daily     Continuous Infusions:   dextrose      sodium chloride 75 mL/hr at 01/23/21 0237     PRN Meds:. HYDROcodone 5 mg - acetaminophen **OR** HYDROcodone 5 mg - acetaminophen, ondansetron **OR** ondansetron, morphine **OR** morphine, glucose, dextrose, glucagon (rDNA), dextrose, potassium chloride **OR** potassium alternative oral replacement **OR** potassium chloride, albuterol, ALPRAZolam, sodium chloride flush, iopamidol  OBJECTIVE   CURRENT VITALS:  height is 5' 5\" (1.651 m) and weight is 154 lb 8 oz (70.1 kg). Her oral temperature is 98 °F (36.7 °C). Her blood pressure is 98/70 and her pulse is 111. Her respiration is 22 and oxygen saturation is 95%. Temperature Range (24h):Temp: 98 °F (36.7 °C) Temp  Av.4 °F (36.9 °C)  Min: 98 °F (36.7 °C)  Max: 98.9 °F (37.2 °C)  BP Range (83K): Systolic (25OBK), LBW:413 , Min:98 , BKD:148     Diastolic (37QLT), YMO:01, Min:57, Max:70    Pulse Range (24h): Pulse  Av.9  Min: 94  Max: 113  Respiration Range (24h): Resp  Av.5  Min: 20  Max: 24  Current Pulse Ox (24h):  SpO2: 95 %  Pulse Ox Range (24h):  SpO2  Av.3 %  Min: 92 %  Max: 99 %  Oxygen Amount and Delivery: O2 Flow Rate (L/min): 2 L/min  Incentive Spirometry Tx:            GENERAL: alert, no distress  LUNGS: clear to ausculation, without wheezes, rales or rhonci  HEART: normal rate and regular rhythm  ABDOMEN: non-distended, soft, incisional tenderness, bowel sounds very hypoactive present in all 4 quadrants and no guarding or peritoneal signs  INCISION: healing well, no significant drainage, no significant erythema  EXTREMITY: no cyanosis, clubbing or edema  In: 1641.4 [P.O.:380; I.V.:1261.4]  Out: 950 [Urine:950]     Date 21 - 21   Shift 1889-2186 0638-6218 6571-1177 24 Hour Total   INTAKE   P.O.(mL/kg/hr) 60(0.1)   60   I. V.(mL/kg) 689. 7(9.8)   689. 7(9.8)   Shift Total(mL/kg) 749. 7(10.7)   749. 7(10.7)   OUTPUT   Urine(mL/kg/hr) 350(0.6)   350   Shift Total(mL/kg) 350(5)   350(5)   Weight (kg) 70.1 70.1 70.1 70.1     LABS     Recent Labs     21  2111 21  0414 21  0450 21  0607   WBC  --  12.4* 9.9 6.5   HGB  --  15.0 12.7 12.3   HCT  --  45.4 38.8 38.1   PLT  --  281 192 201    138 137 141   K 3.1* 3.3* 3.3* 3.4*   CL 99 104 107 105   CO2 21* 22* 21* 25   BUN 15 19 10 6* CREATININE 0.9 0.9 0.6 0.5   MG 1.9 1.8  --   --    CALCIUM 10.0 9.4 8.1* 8.6      No results for input(s): PTT, INR in the last 72 hours. Invalid input(s): PT  Recent Labs     01/20/21 2111   AST 15   ALT 13   BILITOT 0.5   LIPASE 33.4     No results for input(s): TROPONINT in the last 72 hours. RADIOLOGY     XR ABDOMEN (KUB) (SINGLE AP VIEW)   Final Result   Impression:   1. Increase in the small bowel distention consistent with distal small    bowel obstruction with paucity of gas in the colon   2. NG tube is at the level of the cardia of the stomach please advanced 5    cm      This document has been electronically signed by: Howard Monteiro MD on    01/21/2021 08:25 AM         CT ABDOMEN PELVIS WO CONTRAST   Final Result   Impression:      Moderate to severe partial small bowel obstruction right mid abdomen. Spiral configuration to the mesentery suggest internal hernia as a cause. This document has been electronically signed by: Layton Vera MD on    01/20/2021 10:48 PM      All CT scans at this facility use dose modulation, iterative    reconstruction, and/or weight-based   dosing when appropriate to reduce radiation dose to as low as reasonably    achievable. XR CHEST PORTABLE   Final Result   Impression:      Possible bilateral perihilar consolidation. Recommend PA and lateral views without bra.       This document has been electronically signed by: Layton Vera MD on    01/20/2021 09:10 PM               Electronically signed by Tonio Solorzano MD on 1/23/2021 at 9:10 AM

## 2021-01-23 NOTE — PROCEDURES
A Bladder scan was performed at 1850 . The residual amount was measured to be 21 ML. Report of results was given to [email or web address].

## 2021-01-24 VITALS
WEIGHT: 154.5 LBS | SYSTOLIC BLOOD PRESSURE: 110 MMHG | OXYGEN SATURATION: 96 % | BODY MASS INDEX: 25.74 KG/M2 | RESPIRATION RATE: 20 BRPM | HEIGHT: 65 IN | DIASTOLIC BLOOD PRESSURE: 79 MMHG | HEART RATE: 94 BPM | TEMPERATURE: 97.3 F

## 2021-01-24 LAB — POTASSIUM SERPL-SCNC: 3.3 MEQ/L (ref 3.5–5.2)

## 2021-01-24 PROCEDURE — 6370000000 HC RX 637 (ALT 250 FOR IP): Performed by: SURGERY

## 2021-01-24 PROCEDURE — 99024 POSTOP FOLLOW-UP VISIT: CPT | Performed by: SURGERY

## 2021-01-24 PROCEDURE — 6360000002 HC RX W HCPCS: Performed by: SURGERY

## 2021-01-24 PROCEDURE — 2500000003 HC RX 250 WO HCPCS: Performed by: PHYSICIAN ASSISTANT

## 2021-01-24 PROCEDURE — 36415 COLL VENOUS BLD VENIPUNCTURE: CPT

## 2021-01-24 PROCEDURE — 2580000003 HC RX 258: Performed by: SURGERY

## 2021-01-24 PROCEDURE — APPSS60 APP SPLIT SHARED TIME 46-60 MINUTES: Performed by: PHYSICIAN ASSISTANT

## 2021-01-24 PROCEDURE — 94640 AIRWAY INHALATION TREATMENT: CPT

## 2021-01-24 PROCEDURE — 84132 ASSAY OF SERUM POTASSIUM: CPT

## 2021-01-24 PROCEDURE — 6370000000 HC RX 637 (ALT 250 FOR IP): Performed by: PHYSICIAN ASSISTANT

## 2021-01-24 RX ORDER — DOCUSATE SODIUM 100 MG/1
100 CAPSULE, LIQUID FILLED ORAL DAILY
Status: DISCONTINUED | OUTPATIENT
Start: 2021-01-24 | End: 2021-01-24 | Stop reason: HOSPADM

## 2021-01-24 RX ORDER — PSEUDOEPHEDRINE HCL 30 MG
100 TABLET ORAL DAILY
COMMUNITY
Start: 2021-01-25

## 2021-01-24 RX ORDER — HYDROCODONE BITARTRATE AND ACETAMINOPHEN 5; 325 MG/1; MG/1
1 TABLET ORAL EVERY 8 HOURS PRN
Qty: 15 TABLET | Refills: 0 | Status: SHIPPED | OUTPATIENT
Start: 2021-01-24 | End: 2021-01-29

## 2021-01-24 RX ADMIN — HYDROCODONE BITARTRATE AND ACETAMINOPHEN 1 TABLET: 5; 325 TABLET ORAL at 11:50

## 2021-01-24 RX ADMIN — SODIUM CHLORIDE, PRESERVATIVE FREE 10 ML: 5 INJECTION INTRAVENOUS at 09:03

## 2021-01-24 RX ADMIN — DOCUSATE SODIUM 100 MG: 100 CAPSULE, LIQUID FILLED ORAL at 11:44

## 2021-01-24 RX ADMIN — BUDESONIDE 500 MCG: 0.5 INHALANT RESPIRATORY (INHALATION) at 07:44

## 2021-01-24 RX ADMIN — ENOXAPARIN SODIUM 40 MG: 40 INJECTION SUBCUTANEOUS at 09:03

## 2021-01-24 RX ADMIN — MIDODRINE HYDROCHLORIDE 5 MG: 5 TABLET ORAL at 09:03

## 2021-01-24 RX ADMIN — POTASSIUM BICARBONATE 40 MEQ: 782 TABLET, EFFERVESCENT ORAL at 10:36

## 2021-01-24 RX ADMIN — METOPROLOL TARTRATE 25 MG: 25 TABLET ORAL at 09:05

## 2021-01-24 RX ADMIN — SERTRALINE 150 MG: 100 TABLET, FILM COATED ORAL at 09:04

## 2021-01-24 RX ADMIN — SODIUM CHLORIDE: 9 INJECTION, SOLUTION INTRAVENOUS at 03:29

## 2021-01-24 RX ADMIN — FAMOTIDINE 20 MG: 10 INJECTION, SOLUTION INTRAVENOUS at 09:02

## 2021-01-24 ASSESSMENT — PAIN DESCRIPTION - ORIENTATION: ORIENTATION: LOWER;MID

## 2021-01-24 ASSESSMENT — PAIN SCALES - GENERAL: PAINLEVEL_OUTOF10: 4

## 2021-01-24 ASSESSMENT — PAIN DESCRIPTION - LOCATION
LOCATION: ABDOMEN
LOCATION: ABDOMEN

## 2021-01-24 ASSESSMENT — PAIN DESCRIPTION - PAIN TYPE: TYPE: SURGICAL PAIN

## 2021-01-24 ASSESSMENT — PAIN SCALES - WONG BAKER: WONGBAKER_NUMERICALRESPONSE: 4

## 2021-01-24 ASSESSMENT — PAIN DESCRIPTION - DESCRIPTORS: DESCRIPTORS: SORE

## 2021-01-24 NOTE — PROGRESS NOTES
AVS, home health orders, and RX faxed to [address] [facility]. Message was left with agency to notify of patient being discharged tonight. Discharge instructions reviewed with patient and mother. Questions answered and they stated understanding. Chlorhexidine soap and incentive spirometer sent. Patient has difficultly using incentive, but encouraged mother to have patient deep breathe and cough. Patient's midline staples intact, with edges well approximated. No drainage. Abdominal binder in place.

## 2021-01-24 NOTE — PROGRESS NOTES
Dr. Gabby Torres covering for Dr. Mookie Morrison Surgery Postoperative Progress Note    Pt Name: Verner Calender Record Number: [id number]  Date of Birth 1990   Today's Date: 1/24/2021  ASSESSMENT   1. POD # 3 Exploratory laparotomy, Release of SBO due to closed loop obstruction  2. Cerebral palsy   3. Hypokalemia   has a past medical history of Anemia, Asthma, Cerebral palsy ([facility] [address] COPD (chronic obstructive pulmonary disease) ([facility] [address] GERD (gastroesophageal reflux disease), Heart attack ([facility] [address] Hyperlipidemia, Mental retardation, and Schizophrenia simplex ([facility] [address] PLAN   1. Analgesics and antiemetics as needed  2. IV hydration  3. Tolerated NG tube clamped has stayed out  4. Advance diet as tolerated  5. Increase activity  6. Lovenox for DVT prophylaxis  7. Potassium replacement as needed  8. Bowel regimen increased today  Chidi Wade is doing well. Per nursing staff no new issues overnight. Potassium replaced. Her abdomen is softly distended and tender to palpation today. Bowel sounds remain hypoactive. Diet was advanced yesterday per mother's request. She is passing gas but has not had a bowel movement. She is tolerating a DIET LOW FIBER; She has been ambulating in the room. Mother would like to go home when possible, is aware we are awaiting bowel function. Bowel regimen increased. Per RN Pt will need HH at discharge. CURRENT MEDICATIONS   Scheduled Meds:   famotidine (PEPCID) injection  20 mg Intravenous BID    budesonide  0.5 mg Nebulization BID    metoprolol tartrate  25 mg Oral BID    midodrine  5 mg Oral BID WC    pravastatin  20 mg Oral Nightly    QUEtiapine  400 mg Oral Nightly    sertraline  150 mg Oral QAM    sodium chloride flush  10 mL Intravenous 2 times per day    enoxaparin  40 mg Subcutaneous Daily     Continuous Infusions:   dextrose      sodium chloride Stopped (01/24/21 0340)     PRN Meds:. HYDROcodone 5 mg - acetaminophen **OR** HYDROcodone 5 mg - acetaminophen, ondansetron **OR** ondansetron, morphine **OR** morphine, glucose, dextrose, glucagon (rDNA), dextrose, potassium chloride **OR** potassium alternative oral replacement **OR** potassium chloride, albuterol, ALPRAZolam, sodium chloride flush, iopamidol  OBJECTIVE   CURRENT VITALS:  height is 5' 5\" (1.651 m) and weight is 154 lb 8 oz (70.1 kg). Her oral temperature is 98.1 °F (36.7 °C). Her blood pressure is 94/65 and her pulse is 81. Her respiration is 20 and oxygen saturation is 93%. Temperature Range (24h):Temp: 98.1 °F (36.7 °C) Temp  Av.2 °F (36.8 °C)  Min: 97.6 °F (36.4 °C)  Max: 98.7 °F (37.1 °C)  BP Range (57D): Systolic (87IPP), ZYN:142 , Min:94 , JFJ:833     Diastolic (34RRO), IPR:65, Min:57, Max:75    Pulse Range (24h): Pulse  Av.4  Min: 79  Max: 110  Respiration Range (24h): Resp  Av.8  Min: 20  Max: 24  Current Pulse Ox (24h):  SpO2: 93 %  Pulse Ox Range (24h):  SpO2  Av.4 %  Min: 90 %  Max: 97 %  Oxygen Amount and Delivery: O2 Flow Rate (L/min): 2 L/min  Incentive Spirometry Tx:            GENERAL: Awake, alert, NAD. Laying in hospital bed. LUNGS: Clear to ausculation, without wheezes, rales or rhonchi. HEART: Normal rate and regular rhythm. ABDOMEN: Softly distended. Incisional tenderness. Hypoactive bowel sounds. INCISION: Healing well, no significant drainage, no significant erythema. EXTREMITY: No cyanosis, clubbing or edema. In: 2917 [P.O.:320; I.V.:2597]  Out: 550 [Urine:550]     Date 21 0000 - 21   Shift 7144-2466 9648-4764 1976-1838 24 Hour Total   INTAKE   P.O.  120  120   I. V.(mL/kg/hr)  2116.7  2116.7   Shift Total(mL/kg)  2236. 7(31.9)  2236. 7(31.9)   OUTPUT   Urine(mL/kg/hr)  450  450   Shift Total(mL/kg)  450(6.4)  450(6.4)   Weight (kg) 70.1 70.1 70.1 70.1     LABS     Recent Labs     21  0450 21  0607 21  0930   WBC 9.9 6.5  --    HGB 12.7 12.3  --    HCT 38.8 38.1  --     201  --    NA 137 141  --    K 3.3* 3.4* 3.3*    105  --    CO2 21* 25  --    BUN 10 6*  --    CREATININE 0.6 0.5  --    CALCIUM 8.1* 8.6  --       No results for input(s): PTT, INR in the last 72 hours. Invalid input(s): PT  No results for input(s): AST, ALT, BILITOT, BILIDIR, AMYLASE, LIPASE, LDH, LACTA in the last 72 hours. No results for input(s): TROPONINT in the last 72 hours. RADIOLOGY     XR ABDOMEN (KUB) (SINGLE AP VIEW)   Final Result   Impression:   1. Increase in the small bowel distention consistent with distal small    bowel obstruction with paucity of gas in the colon   2. NG tube is at the level of the cardia of the stomach please advanced 5    cm      This document has been electronically signed by: Kiersten Tapia MD on    01/21/2021 08:25 AM         CT ABDOMEN PELVIS WO CONTRAST   Final Result   Impression:      Moderate to severe partial small bowel obstruction right mid abdomen. Spiral configuration to the mesentery suggest internal hernia as a cause. This document has been electronically signed by: Stacey Ramírez MD on    01/20/2021 10:48 PM      All CT scans at this facility use dose modulation, iterative    reconstruction, and/or weight-based   dosing when appropriate to reduce radiation dose to as low as reasonably    achievable. XR CHEST PORTABLE   Final Result   Impression:      Possible bilateral perihilar consolidation. Recommend PA and lateral views without bra.       This document has been electronically signed by: Stacey Ramírez MD on    01/20/2021 09:10 PM           Electronically signed by Jacklyn Small PA-C on 1/24/2021 at 11:30 AM     Pt tyler diet, + flatus  Mom desire to take pt home  Pt discharged

## 2021-01-26 ENCOUNTER — TELEPHONE (OUTPATIENT)
Dept: SURGERY | Age: 31
End: 2021-01-26

## 2021-01-26 NOTE — TELEPHONE ENCOUNTER
Pt is s/p ex lap, CECILIA 1/21. Mom calling the office concerned that when pt wipes after a bowel movement, there is some bright red blood on the toilet paper. No abdominal pain. Discussed this could just be irritation and to monitor at this time-call or go to ED if large amount of blood in the toilet. Mom concerned there may be something else wrong and requesting your input. Please advise.

## 2021-01-29 ENCOUNTER — HOSPITAL ENCOUNTER (EMERGENCY)
Age: 31
Discharge: HOME | End: 2021-01-29
Attending: EMERGENCY MEDICINE
Payer: COMMERCIAL

## 2021-01-29 ENCOUNTER — OFFICE VISIT (OUTPATIENT)
Dept: SURGERY | Age: 31
End: 2021-01-29

## 2021-01-29 VITALS
BODY MASS INDEX: 26.29 KG/M2 | HEIGHT: 64 IN | RESPIRATION RATE: 16 BRPM | SYSTOLIC BLOOD PRESSURE: 122 MMHG | DIASTOLIC BLOOD PRESSURE: 71 MMHG | OXYGEN SATURATION: 98 % | HEART RATE: 88 BPM | TEMPERATURE: 97.5 F | WEIGHT: 154 LBS

## 2021-01-29 VITALS
SYSTOLIC BLOOD PRESSURE: 118 MMHG | OXYGEN SATURATION: 97 % | WEIGHT: 154 LBS | HEIGHT: 65 IN | HEART RATE: 102 BPM | RESPIRATION RATE: 14 BRPM | TEMPERATURE: 97.5 F | BODY MASS INDEX: 25.66 KG/M2 | DIASTOLIC BLOOD PRESSURE: 72 MMHG

## 2021-01-29 DIAGNOSIS — K56.600 PARTIAL SMALL BOWEL OBSTRUCTION (HCC): Primary | ICD-10-CM

## 2021-01-29 DIAGNOSIS — F69 BEHAVIOR PROBLEM, ADULT: Primary | ICD-10-CM

## 2021-01-29 LAB
ALBUMIN SERPL-MCNC: 4.2 G/DL (ref 3.5–5.1)
ALP BLD-CCNC: 69 U/L (ref 38–126)
ALT SERPL-CCNC: 19 U/L (ref 11–66)
AMPHETAMINE+METHAMPHETAMINE URINE SCREEN: NEGATIVE
ANION GAP SERPL CALCULATED.3IONS-SCNC: 12 MEQ/L (ref 8–16)
AST SERPL-CCNC: 25 U/L (ref 5–40)
BACTERIA: ABNORMAL /HPF
BARBITURATE QUANTITATIVE URINE: NEGATIVE
BASOPHILS # BLD: 0.2 %
BASOPHILS ABSOLUTE: 0 THOU/MM3 (ref 0–0.1)
BENZODIAZEPINE QUANTITATIVE URINE: POSITIVE
BILIRUB SERPL-MCNC: 0.4 MG/DL (ref 0.3–1.2)
BILIRUBIN URINE: NEGATIVE
BLOOD, URINE: ABNORMAL
BUN BLDV-MCNC: 8 MG/DL (ref 7–22)
CALCIUM SERPL-MCNC: 9.4 MG/DL (ref 8.5–10.5)
CANNABINOID QUANTITATIVE URINE: NEGATIVE
CASTS 2: ABNORMAL /LPF
CASTS UA: ABNORMAL /LPF
CHARACTER, URINE: ABNORMAL
CHLORIDE BLD-SCNC: 100 MEQ/L (ref 98–111)
CO2: 29 MEQ/L (ref 23–33)
COCAINE METABOLITE QUANTITATIVE URINE: NEGATIVE
COLOR: ABNORMAL
CREAT SERPL-MCNC: 0.7 MG/DL (ref 0.4–1.2)
CRYSTALS, UA: ABNORMAL
EOSINOPHIL # BLD: 1.7 %
EOSINOPHILS ABSOLUTE: 0.1 THOU/MM3 (ref 0–0.4)
EPITHELIAL CELLS, UA: ABNORMAL /HPF
ERYTHROCYTE [DISTWIDTH] IN BLOOD BY AUTOMATED COUNT: 13.2 % (ref 11.5–14.5)
ERYTHROCYTE [DISTWIDTH] IN BLOOD BY AUTOMATED COUNT: 46.5 FL (ref 35–45)
GFR SERPL CREATININE-BSD FRML MDRD: > 90 ML/MIN/1.73M2
GLUCOSE BLD-MCNC: 107 MG/DL (ref 70–108)
GLUCOSE URINE: NEGATIVE MG/DL
HCT VFR BLD CALC: 41 % (ref 37–47)
HEMOGLOBIN: 13.6 GM/DL (ref 12–16)
IMMATURE GRANS (ABS): 0.04 THOU/MM3 (ref 0–0.07)
IMMATURE GRANULOCYTES: 0.6 %
KETONES, URINE: 15
LEUKOCYTE ESTERASE, URINE: ABNORMAL
LYMPHOCYTES # BLD: 15.4 %
LYMPHOCYTES ABSOLUTE: 1 THOU/MM3 (ref 1–4.8)
MCH RBC QN AUTO: 31.8 PG (ref 26–33)
MCHC RBC AUTO-ENTMCNC: 33.2 GM/DL (ref 32.2–35.5)
MCV RBC AUTO: 95.8 FL (ref 81–99)
MISCELLANEOUS 2: ABNORMAL
MONOCYTES # BLD: 6.7 %
MONOCYTES ABSOLUTE: 0.4 THOU/MM3 (ref 0.4–1.3)
MUCUS: ABNORMAL
NITRITE, URINE: NEGATIVE
NUCLEATED RED BLOOD CELLS: 0 /100 WBC
OPIATES, URINE: POSITIVE
OSMOLALITY CALCULATION: 280.1 MOSMOL/KG (ref 275–300)
OXYCODONE: NEGATIVE
PH UA: 5.5 (ref 5–9)
PHENCYCLIDINE QUANTITATIVE URINE: NEGATIVE
PLATELET # BLD: 305 THOU/MM3 (ref 130–400)
PMV BLD AUTO: 10.7 FL (ref 9.4–12.4)
POTASSIUM REFLEX MAGNESIUM: 3.7 MEQ/L (ref 3.5–5.2)
PREGNANCY, URINE: NEGATIVE
PROTEIN UA: 30
RBC # BLD: 4.28 MILL/MM3 (ref 4.2–5.4)
RBC URINE: ABNORMAL /HPF
RENAL EPITHELIAL, UA: ABNORMAL
SEG NEUTROPHILS: 75.4 %
SEGMENTED NEUTROPHILS ABSOLUTE COUNT: 4.8 THOU/MM3 (ref 1.8–7.7)
SODIUM BLD-SCNC: 141 MEQ/L (ref 135–145)
SPECIFIC GRAVITY, URINE: 1.03 (ref 1–1.03)
TOTAL PROTEIN: 6.9 G/DL (ref 6.1–8)
UROBILINOGEN, URINE: 1 EU/DL (ref 0–1)
WBC # BLD: 6.4 THOU/MM3 (ref 4.8–10.8)
WBC UA: ABNORMAL /HPF
YEAST: ABNORMAL

## 2021-01-29 PROCEDURE — 85025 COMPLETE CBC W/AUTO DIFF WBC: CPT

## 2021-01-29 PROCEDURE — 81025 URINE PREGNANCY TEST: CPT

## 2021-01-29 PROCEDURE — 81001 URINALYSIS AUTO W/SCOPE: CPT

## 2021-01-29 PROCEDURE — 36415 COLL VENOUS BLD VENIPUNCTURE: CPT

## 2021-01-29 PROCEDURE — 99282 EMERGENCY DEPT VISIT SF MDM: CPT

## 2021-01-29 PROCEDURE — 99024 POSTOP FOLLOW-UP VISIT: CPT | Performed by: SURGERY

## 2021-01-29 PROCEDURE — 80053 COMPREHEN METABOLIC PANEL: CPT

## 2021-01-29 PROCEDURE — 80307 DRUG TEST PRSMV CHEM ANLYZR: CPT

## 2021-01-29 ASSESSMENT — PAIN DESCRIPTION - ORIENTATION: ORIENTATION: LEFT;RIGHT

## 2021-01-29 ASSESSMENT — PAIN DESCRIPTION - LOCATION: LOCATION: FOOT

## 2021-01-29 ASSESSMENT — PAIN DESCRIPTION - PAIN TYPE: TYPE: ACUTE PAIN

## 2021-01-29 ASSESSMENT — PATIENT HEALTH QUESTIONNAIRE - PHQ9: SUM OF ALL RESPONSES TO PHQ QUESTIONS 1-9: 4
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
## 2021-01-29 NOTE — LETTER
Kacy Jolley,   [address] [address] [address]  [phone]     Work excuse    Pt Name: Ashley Mccray  Today's Date: 1/29/2021    To Whom It May Concern,    Wilber Valle was evaluated today and may return to work on Feb 8th. 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 Should you have any questions or require additional details, please feel free to contact my office. Sincerely,      Anupam Jovel.  Walker Crowder
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
## 2021-01-29 NOTE — LETTER
Nakul Galvez DO  [address] [facility]. [address]  [place] [address]  [phone]     Pt Name: Hernando Lux  Medical Record Number: [id number]  Date of Birth 1990   Today's Date: 1/29/2021    Yaima Berkowitz was evaluated in the office today. My assessment and plans are listed below. ASSESSMENT      Diagnosis Orders   1. Partial small bowel obstruction (HCC)      S/p ex lap release of obstruction 1/21/21   Incision is clean, dry and intact or healing as expected  PLANS:     Pathology reviewed with the patient who understands. All questions were answered. Patient Instructions   No lifting, pushing or pulling more than 30 lbs for 2 weeks, then 50 lbs for 2 weeks, then 80 lbs for 2 weeks. No restrictions after 6 weeks. Every other staple removed. Follow up: Return in about 1 week (around 2/5/2021). If I can provide any additional assistance or you have any concerns, please feel free to contact me. Thank you for allowing to participate in the care of your patients. Sincerely,      Madyson Galicia.  Walker Crowder

## 2021-01-29 NOTE — ED NOTES
Reassessment of the patients Aggressive Behavior, Vaginal Bleeding, and Suicidal   is unchanged, the patients pain reassessment is a 0/10, Side rails up times 2, call light in reach, will continue to monitor.      Thong Tapia RN  01/29/21 4080

## 2021-01-29 NOTE — ED TRIAGE NOTES
Presents to ED with c/o aggressive behavior at home and vaginal bleeding. Mom states bleeding has been going on for a few months. Patient has an implant and was to be changed feburary 2nd. Mom states aggressive behavior started yesterday. States she was \"hitting, kicking, throwing stuff, pouring lemonade on furniture, slapping\". Patient reports being suicidal with no plan. Resting comfortably in bed. Calm at this time. Mom at bedside.

## 2021-01-30 ENCOUNTER — HOSPITAL ENCOUNTER (EMERGENCY)
Age: 31
Discharge: HOME | End: 2021-01-31
Attending: EMERGENCY MEDICINE
Payer: COMMERCIAL

## 2021-01-30 VITALS
HEART RATE: 76 BPM | OXYGEN SATURATION: 96 % | TEMPERATURE: 98.3 F | RESPIRATION RATE: 18 BRPM | HEIGHT: 64 IN | SYSTOLIC BLOOD PRESSURE: 128 MMHG | BODY MASS INDEX: 26.29 KG/M2 | DIASTOLIC BLOOD PRESSURE: 82 MMHG | WEIGHT: 154 LBS

## 2021-01-30 DIAGNOSIS — F69 BEHAVIOR PROBLEM, ADULT: Primary | ICD-10-CM

## 2021-01-30 LAB
ACETAMINOPHEN LEVEL: < 5 UG/ML (ref 0–20)
ALBUMIN SERPL-MCNC: 4.1 G/DL (ref 3.5–5.1)
ALP BLD-CCNC: 63 U/L (ref 38–126)
ALT SERPL-CCNC: 19 U/L (ref 11–66)
AMPHETAMINE+METHAMPHETAMINE URINE SCREEN: NEGATIVE
ANION GAP SERPL CALCULATED.3IONS-SCNC: 11 MEQ/L (ref 8–16)
AST SERPL-CCNC: 21 U/L (ref 5–40)
BACTERIA: ABNORMAL /HPF
BARBITURATE QUANTITATIVE URINE: NEGATIVE
BASOPHILS # BLD: 0.5 %
BASOPHILS ABSOLUTE: 0 THOU/MM3 (ref 0–0.1)
BENZODIAZEPINE QUANTITATIVE URINE: POSITIVE
BILIRUB SERPL-MCNC: 0.4 MG/DL (ref 0.3–1.2)
BILIRUBIN URINE: NEGATIVE
BLOOD, URINE: ABNORMAL
BUN BLDV-MCNC: 11 MG/DL (ref 7–22)
CALCIUM SERPL-MCNC: 9.2 MG/DL (ref 8.5–10.5)
CANNABINOID QUANTITATIVE URINE: NEGATIVE
CASTS 2: ABNORMAL /LPF
CASTS UA: ABNORMAL /LPF
CHARACTER, URINE: ABNORMAL
CHLORIDE BLD-SCNC: 103 MEQ/L (ref 98–111)
CO2: 29 MEQ/L (ref 23–33)
COCAINE METABOLITE QUANTITATIVE URINE: NEGATIVE
COLOR: YELLOW
CREAT SERPL-MCNC: 0.7 MG/DL (ref 0.4–1.2)
CRYSTALS, UA: ABNORMAL
EOSINOPHIL # BLD: 2.5 %
EOSINOPHILS ABSOLUTE: 0.2 THOU/MM3 (ref 0–0.4)
EPITHELIAL CELLS, UA: ABNORMAL /HPF
ERYTHROCYTE [DISTWIDTH] IN BLOOD BY AUTOMATED COUNT: 12.9 % (ref 11.5–14.5)
ERYTHROCYTE [DISTWIDTH] IN BLOOD BY AUTOMATED COUNT: 45.4 FL (ref 35–45)
ETHYL ALCOHOL, SERUM: < 0.01 %
GFR SERPL CREATININE-BSD FRML MDRD: > 90 ML/MIN/1.73M2
GLUCOSE BLD-MCNC: 98 MG/DL (ref 70–108)
GLUCOSE URINE: NEGATIVE MG/DL
HCT VFR BLD CALC: 39.1 % (ref 37–47)
HEMOGLOBIN: 12.7 GM/DL (ref 12–16)
IMMATURE GRANS (ABS): 0.04 THOU/MM3 (ref 0–0.07)
IMMATURE GRANULOCYTES: 0.6 %
KETONES, URINE: 40
LEUKOCYTE ESTERASE, URINE: ABNORMAL
LYMPHOCYTES # BLD: 18.8 %
LYMPHOCYTES ABSOLUTE: 1.2 THOU/MM3 (ref 1–4.8)
MCH RBC QN AUTO: 31.2 PG (ref 26–33)
MCHC RBC AUTO-ENTMCNC: 32.5 GM/DL (ref 32.2–35.5)
MCV RBC AUTO: 96.1 FL (ref 81–99)
MISCELLANEOUS 2: ABNORMAL
MONOCYTES # BLD: 8.4 %
MONOCYTES ABSOLUTE: 0.5 THOU/MM3 (ref 0.4–1.3)
NITRITE, URINE: NEGATIVE
NUCLEATED RED BLOOD CELLS: 0 /100 WBC
OPIATES, URINE: POSITIVE
OSMOLALITY CALCULATION: 284.4 MOSMOL/KG (ref 275–300)
OXYCODONE: NEGATIVE
PH UA: 5.5 (ref 5–9)
PHENCYCLIDINE QUANTITATIVE URINE: NEGATIVE
PLATELET # BLD: 282 THOU/MM3 (ref 130–400)
PMV BLD AUTO: 10.7 FL (ref 9.4–12.4)
POTASSIUM SERPL-SCNC: 3.5 MEQ/L (ref 3.5–5.2)
PREGNANCY, SERUM: NEGATIVE
PROTEIN UA: 30
RBC # BLD: 4.07 MILL/MM3 (ref 4.2–5.4)
RBC URINE: > 100 /HPF
RENAL EPITHELIAL, UA: ABNORMAL
SALICYLATE, SERUM: < 0.3 MG/DL (ref 2–10)
SEG NEUTROPHILS: 69.2 %
SEGMENTED NEUTROPHILS ABSOLUTE COUNT: 4.4 THOU/MM3 (ref 1.8–7.7)
SODIUM BLD-SCNC: 143 MEQ/L (ref 135–145)
SPECIFIC GRAVITY, URINE: 1.03 (ref 1–1.03)
TOTAL PROTEIN: 6.8 G/DL (ref 6.1–8)
TSH SERPL DL<=0.05 MIU/L-ACNC: 0.81 UIU/ML (ref 0.4–4.2)
UROBILINOGEN, URINE: 1 EU/DL (ref 0–1)
WBC # BLD: 6.3 THOU/MM3 (ref 4.8–10.8)
WBC UA: ABNORMAL /HPF
YEAST: ABNORMAL

## 2021-01-30 PROCEDURE — 81001 URINALYSIS AUTO W/SCOPE: CPT

## 2021-01-30 PROCEDURE — 85025 COMPLETE CBC W/AUTO DIFF WBC: CPT

## 2021-01-30 PROCEDURE — 80053 COMPREHEN METABOLIC PANEL: CPT

## 2021-01-30 PROCEDURE — 82077 ASSAY SPEC XCP UR&BREATH IA: CPT

## 2021-01-30 PROCEDURE — 96372 THER/PROPH/DIAG INJ SC/IM: CPT

## 2021-01-30 PROCEDURE — 87186 SC STD MICRODIL/AGAR DIL: CPT

## 2021-01-30 PROCEDURE — 80143 DRUG ASSAY ACETAMINOPHEN: CPT

## 2021-01-30 PROCEDURE — 99284 EMERGENCY DEPT VISIT MOD MDM: CPT

## 2021-01-30 PROCEDURE — 84703 CHORIONIC GONADOTROPIN ASSAY: CPT

## 2021-01-30 PROCEDURE — 36415 COLL VENOUS BLD VENIPUNCTURE: CPT

## 2021-01-30 PROCEDURE — 87077 CULTURE AEROBIC IDENTIFY: CPT

## 2021-01-30 PROCEDURE — 80307 DRUG TEST PRSMV CHEM ANLYZR: CPT

## 2021-01-30 PROCEDURE — 80179 DRUG ASSAY SALICYLATE: CPT

## 2021-01-30 PROCEDURE — 84443 ASSAY THYROID STIM HORMONE: CPT

## 2021-01-30 PROCEDURE — 87086 URINE CULTURE/COLONY COUNT: CPT

## 2021-01-30 ASSESSMENT — PAIN DESCRIPTION - LOCATION: LOCATION: ABDOMEN;INCISION

## 2021-01-30 ASSESSMENT — SLEEP AND FATIGUE QUESTIONNAIRES
DO YOU HAVE DIFFICULTY SLEEPING: NO
AVERAGE NUMBER OF SLEEP HOURS: 8
DO YOU USE A SLEEP AID: NO

## 2021-01-30 ASSESSMENT — PAIN DESCRIPTION - DESCRIPTORS: DESCRIPTORS: SORE

## 2021-01-30 ASSESSMENT — PAIN DESCRIPTION - PAIN TYPE: TYPE: ACUTE PAIN

## 2021-01-30 ASSESSMENT — PAIN DESCRIPTION - FREQUENCY: FREQUENCY: CONTINUOUS

## 2021-01-30 NOTE — DISCHARGE SUMMARY
Discharge Summary   General Surgery    Patient Identification:  Bradley Chester  : 1990  MRN: [id number]   Account: [de-identified]     Admit date: 2021  Discharge date: 21  Attending provider: Dr. Mitch Hernandez       Primary care provider: KAYLI Nelson CNP     Discharge Diagnoses: Active Problems:    Partial small bowel obstruction (HCC)  Resolved Problems:    * No resolved hospital problems. *    Hospital Course:   Bradley Chester is a 31 y.o. female admitted to [address] [place] [address] on 2021 for Partial small bowel obstruction. She underwent Exploratory laparotomy with lysis of adhesions with Dr. Mitch Hernandez 21. Her diet and activity were advanced postoperatively and she was able to be discharged home with Home Health 21. Discharge Medications:    Augustin Boby   Home Medication Instructions HO    Printed on:21 8243   Medication Information                      acetaminophen (TYLENOL) 500 MG tablet  Take 500 mg by mouth every 6 hours as needed for Pain             albuterol (PROVENTIL) (2.5 MG/3ML) 0.083% nebulizer solution  inhale contents of 1 vial in nebulizer every 6 hours if needed FOR WHEEZING OR SHORTNESS OF BREATH             ALPRAZolam (XANAX PO)  Take 1 mg by mouth 3 times daily              benzonatate (TESSALON) 200 MG capsule  Take 1 capsule by mouth 3 times daily as needed for Cough (Swallow whole, do not chew) Swallow whole, do not chew             budesonide (PULMICORT) 0.5 MG/2ML nebulizer suspension  inhale contents of 1 vial in nebulizer twice a day             docusate sodium (COLACE, DULCOLAX) 100 MG CAPS  Take 100 mg by mouth daily             etonogestrel (NEXPLANON) 68 MG implant  68 mg by Subdermal route once             metoprolol tartrate (LOPRESSOR) 25 MG tablet  Take 1 tablet by mouth 2 times daily             midodrine (PROAMATINE) 5 MG tablet  take 1 tablet by mouth twice a day             omeprazole (PRILOSEC) 20 MG delayed release capsule  Take 20 mg by mouth daily             polyethylene glycol (GLYCOLAX) powder  Dispense 238 Gram Bottle. Use as Directed             pravastatin (PRAVACHOL) 20 MG tablet  take 1 tablet by mouth once daily at bedtime             QUEtiapine (SEROQUEL) 300 MG tablet  Take 400 mg by mouth daily              Sertraline HCl (ZOLOFT PO)  Take 150 mg by mouth every morning                  Patient Instructions:    Discharge lab work: None  Activity: Activity as tolerated  Diet: No diet orders on file    Code Status: Prior    Follow-up visits:   [facility] [address]  [facility]  [phone]    Schedule an appointment as soon as possible for a visit in 1 week      [facility]  [phone]    Schedule an appointment as soon as possible for a visit in 1 week       [address] [address] [phone]        Procedures: 1/21/21 - Ex lap with lysis of adhesions    Consults:   None    Examination:  Vitals:  Vitals:    01/24/21 0852 01/24/21 1013 01/24/21 1110 01/24/21 1443   BP: 110/70 99/70 94/65 110/79   Pulse: 110 99 81 94   Resp: 24 20 20 20   Temp: 97.7 °F (36.5 °C)  98.1 °F (36.7 °C) 97.3 °F (36.3 °C)   TempSrc: Oral  Oral Oral   SpO2: 92% 93% 93% 96%   Weight:       Height:         Weight: Weight: 154 lb 8 oz (70.1 kg)     24 hour intake/output:No intake or output data in the 24 hours ending 01/30/21 0851    GENERAL: Awake, alert, NAD. Laying in hospital bed. LUNGS: Clear to ausculation, without wheezes, rales or rhonchi. HEART: Normal rate and regular rhythm. ABDOMEN: Softly distended. Incisional tenderness. Hypoactive bowel sounds. INCISION: Healing well, no significant drainage, no significant erythema. EXTREMITY: No cyanosis, clubbing or edema. Significant Diagnostics:   Radiology: No results found.     Labs:   Recent Results (from the past 72 hour(s))   Pregnancy, Urine    Collection Time: 01/29/21  5:05 PM   Result Value Ref Range    Pregnancy, Urine NEGATIVE NEGATIVE   Urine Drug Screen    Collection Time: 01/29/21  5:05 PM   Result Value Ref Range    AMPHETAMINE+METHAMPHETAMINE URINE SCREEN Negative NEGATIVE    Barbiturate Quant, Ur Negative NEGATIVE    Benzodiazepine Quant, Ur POSITIVE NEGATIVE    Cannabinoid Quant, Ur Negative NEGATIVE    Cocaine Metab Quant, Ur Negative NEGATIVE    Opiates, Urine POSITIVE NEGATIVE    Oxycodone Negative NEGATIVE    PCP Quant, Ur Negative NEGATIVE   Urine with Reflexed Micro    Collection Time: 01/29/21  5:05 PM   Result Value Ref Range    Glucose, Ur NEGATIVE NEGATIVE mg/dl    Bilirubin Urine NEGATIVE NEGATIVE    Ketones, Urine 15 (A) NEGATIVE    Specific Gravity, Urine 1.027 1.002 - 1.030    Blood, Urine LARGE (A) NEGATIVE    pH, UA 5.5 5.0 - 9.0    Protein, UA 30 (A) NEGATIVE    Urobilinogen, Urine 1.0 0.0 - 1.0 eu/dl    Nitrite, Urine NEGATIVE NEGATIVE    Leukocyte Esterase, Urine SMALL (A) NEGATIVE    Color, UA ORANGE (A) STRAW-YELLOW    Character, Urine TURBID (A) CLEAR-SL CLOUD    RBC, UA 25-50 0-2/hpf /hpf    WBC, UA 2-4 0-4/hpf /hpf    Epithelial Cells, UA 3-5 3-5/hpf /hpf    Mucus, UA THREADS NONE SEEN/THREA    Bacteria, UA FEW FEW/NONE SEEN /hpf    Casts UA 8-15 HYALINE NONE SEEN /lpf    Crystals, UA NONE SEEN NONE SEEN    Renal Epithelial, UA NONE SEEN NONE SEEN    Yeast, UA NONE SEEN NONE SEEN    CASTS 2 NONE SEEN NONE SEEN /lpf    MISCELLANEOUS 2 NONE SEEN    CBC Auto Differential    Collection Time: 01/29/21  5:15 PM   Result Value Ref Range    WBC 6.4 4.8 - 10.8 thou/mm3    RBC 4.28 4.20 - 5.40 mill/mm3    Hemoglobin 13.6 12.0 - 16.0 gm/dl    Hematocrit 41.0 37.0 - 47.0 %    MCV 95.8 81.0 - 99.0 fL    MCH 31.8 26.0 - 33.0 pg    MCHC 33.2 32.2 - 35.5 gm/dl    RDW-CV 13.2 11.5 - 14.5 %    RDW-SD 46.5 (H) 35.0 - 45.0 fL    Platelets 005 538 - 311 thou/mm3    MPV 10.7 9.4 - 12.4 fL    Seg Neutrophils 75.4 %    Lymphocytes 15.4 %    Monocytes 6.7 %    Eosinophils 1.7 %    Basophils 0.2 %    Immature Granulocytes 0.6 %    Segs Absolute 4.8 1.8 - 7.7 thou/mm3    Lymphocytes Absolute 1.0 1.0 - 4.8 thou/mm3    Monocytes Absolute 0.4 0.4 - 1.3 thou/mm3    Eosinophils Absolute 0.1 0.0 - 0.4 thou/mm3    Basophils Absolute 0.0 0.0 - 0.1 thou/mm3    Immature Grans (Abs) 0.04 0.00 - 0.07 thou/mm3    nRBC 0 /100 wbc   Comprehensive Metabolic Panel w/ Reflex to MG    Collection Time: 01/29/21  5:15 PM   Result Value Ref Range    Glucose 107 70 - 108 mg/dL    CREATININE 0.7 0.4 - 1.2 mg/dL    BUN 8 7 - 22 mg/dL    Sodium 141 135 - 145 meq/L    Potassium reflex Magnesium 3.7 3.5 - 5.2 meq/L    Chloride 100 98 - 111 meq/L    CO2 29 23 - 33 meq/L    Calcium 9.4 8.5 - 10.5 mg/dL    AST 25 5 - 40 U/L    Alkaline Phosphatase 69 38 - 126 U/L    Total Protein 6.9 6.1 - 8.0 g/dL    Albumin 4.2 3.5 - 5.1 g/dL    Total Bilirubin 0.4 0.3 - 1.2 mg/dL    ALT 19 11 - 66 U/L   Anion Gap    Collection Time: 01/29/21  5:15 PM   Result Value Ref Range    Anion Gap 12.0 8.0 - 16.0 meq/L   Glomerular Filtration Rate, Estimated    Collection Time: 01/29/21  5:15 PM   Result Value Ref Range    Est, Glom Filt Rate >90 ml/min/1.73m2   Osmolality    Collection Time: 01/29/21  5:15 PM   Result Value Ref Range    Osmolality Calc 280.1 275.0 - 300.0 mOsmol/kg     Discharge condition: Stable  Disposition: Home  Time spent on discharge: >60 minutes     Electronically signed by Britton Jo PA-C on 1/30/2021 at 8:51 AM

## 2021-01-31 PROCEDURE — 6360000002 HC RX W HCPCS: Performed by: STUDENT IN AN ORGANIZED HEALTH CARE EDUCATION/TRAINING PROGRAM

## 2021-01-31 RX ORDER — HALOPERIDOL 5 MG/ML
5 INJECTION INTRAMUSCULAR ONCE
Status: COMPLETED | OUTPATIENT
Start: 2021-01-31 | End: 2021-01-31

## 2021-01-31 RX ADMIN — HALOPERIDOL LACTATE 5 MG: 5 INJECTION, SOLUTION INTRAMUSCULAR at 01:41

## 2021-01-31 ASSESSMENT — ENCOUNTER SYMPTOMS
ABDOMINAL PAIN: 1
SHORTNESS OF BREATH: 0
EYE ITCHING: 0
DIARRHEA: 0
SINUS PAIN: 0
EYE REDNESS: 0
SINUS PRESSURE: 0
COLOR CHANGE: 0
VOMITING: 0
ABDOMINAL DISTENTION: 0
NAUSEA: 0
RHINORRHEA: 0
CHEST TIGHTNESS: 0
EYE DISCHARGE: 0

## 2021-01-31 NOTE — ED NOTES
[address] work at bedside. Pt parents updated on POC. Pt resting on cot, respirations easy and unlabored.      Lane Nelson RN  01/30/21 5286

## 2021-01-31 NOTE — PROGRESS NOTES
Spoke with patients medical provider. Patient is medically cleared. Spoke with Dr. Rene Zazueta. Patient to be discharged and follow up outpatient. Family and patient not receptive to plan at this time. Informed patients medical provider.

## 2021-01-31 NOTE — ED PROVIDER NOTES
ATTENDING NOTE:    I supervised and discussed the history, physical exam and the management of this patient with the resident. I reviewed the resident's note and agree with the documented findings and plan of care. Please see my additional note.     Electronically verified by Luis Hoang MD  02/03/21 6347

## 2021-01-31 NOTE — ED NOTES
ED nurse-to-nurse bedside report    Chief Complaint   Patient presents with    Altered Mental Status      LOC: alert and orientated to name, place, date  Vital signs   Vitals:    01/30/21 1936 01/30/21 2057 01/30/21 2150   BP: 125/89 136/78 128/82   Pulse: 78 71 76   Resp: 18 18 18   Temp: 98.3 °F (36.8 °C)     TempSrc: Oral     SpO2: 98% 97% 96%   Weight: 154 lb (69.9 kg)     Height: 5' 4\" (1.626 m)        Pain:    Pain Interventions: Positioning  Pain Goal: No Pain  Oxygen: No    Current needs required Room Air   Telemetry: No  LDAs:    Continuous Infusions:   Mobility: Independent  Sims Fall Risk Score: No flowsheet data found.   Fall Interventions: Hourly Rounding  Report given to: Mitzi Calderon RN  01/30/21 8486

## 2021-01-31 NOTE — ED PROVIDER NOTES
[place] ENCOUNTER          Pt Name: Camryn Prince  MRN: [id number]  [place] 1990  Date of evaluation: 1/30/2021  Treating Resident Physician: Jenny Moreno DO  Supervising Physician: Enrique Hernandez MD    CHIEF COMPLAINT       Chief Complaint   Patient presents with    Altered Mental Status     History obtained from the patient. HISTORY OF PRESENT ILLNESS    Camryn Prince is a 31 y.o. female with a past medical history of MRDD, cerebral palsy, and schizophrenia who presents to the emergency department for evaluation of aggressive and violent behavior. The patient was seen with her family at bedside who notes that she has been more agitated and violent since having surgery for a small bowel obstruction last week. The patient's family state that she has had periods of increased agitation, is hitting people within her home, and pulling other people's hair. Patient's family also reports that she has been throwing pillows and dumping food and medications on the floor. When asked why the patient is acting this way, the patient reports that she is concerned that she is going to go to group home but is not able to fully articulate why she feels that way. Denies suicidal ideation. She denies active auditory or visual hallucinations, however states that she has \"seeing faces \"and has heard voices saying \"stop doing that. \"    She was seen and evaluated here yesterday evening for the same complaint, and was evaluated by [facility], who recommended that she did not meet inpatient psychiatric criteria and was discharged home with outpatient follow-up with Prashanth. The patient's family reports that they attempted contacting Prashanth, however have not yet been able to follow-up and returned to the emergency department because the patient's behavior has persisted.   The patient's family were asking for inpatient psychiatric admission because of her change LAPAROTOMY, LYSIS OF ADHESIONS performed by Alicia Daily DO at [address]   No current facility-administered medications for this encounter. Current Outpatient Medications:     docusate sodium (COLACE, DULCOLAX) 100 MG CAPS, Take 100 mg by mouth daily, Disp: , Rfl:     omeprazole (PRILOSEC) 20 MG delayed release capsule, Take 20 mg by mouth daily, Disp: , Rfl:     acetaminophen (TYLENOL) 500 MG tablet, Take 500 mg by mouth every 6 hours as needed for Pain, Disp: , Rfl:     midodrine (PROAMATINE) 5 MG tablet, take 1 tablet by mouth twice a day, Disp: 180 tablet, Rfl: 3    metoprolol tartrate (LOPRESSOR) 25 MG tablet, Take 1 tablet by mouth 2 times daily, Disp: 180 tablet, Rfl: 2    pravastatin (PRAVACHOL) 20 MG tablet, take 1 tablet by mouth once daily at bedtime, Disp: , Rfl:     benzonatate (TESSALON) 200 MG capsule, Take 1 capsule by mouth 3 times daily as needed for Cough (Swallow whole, do not chew) Swallow whole, do not chew, Disp: 15 capsule, Rfl: 0    albuterol (PROVENTIL) (2.5 MG/3ML) 0.083% nebulizer solution, inhale contents of 1 vial in nebulizer every 6 hours if needed FOR WHEEZING OR SHORTNESS OF BREATH, Disp: 375 mL, Rfl: 11    budesonide (PULMICORT) 0.5 MG/2ML nebulizer suspension, inhale contents of 1 vial in nebulizer twice a day, Disp: 120 mL, Rfl: 11    QUEtiapine (SEROQUEL) 300 MG tablet, Take 400 mg by mouth daily , Disp: , Rfl:     polyethylene glycol (GLYCOLAX) powder, Dispense 238 Gram Bottle.   Use as Directed, Disp: 238 g, Rfl: 0    etonogestrel (NEXPLANON) 68 MG implant, 68 mg by Subdermal route once, Disp: , Rfl:     Sertraline HCl (ZOLOFT PO), Take 150 mg by mouth every morning , Disp: , Rfl:     ALPRAZolam (XANAX PO), Take 1 mg by mouth 3 times daily , Disp: , Rfl:       SOCIAL HISTORY     Social History     Social History Narrative    Not on file     Social History     Tobacco Use    Smoking status: Passive Smoke Exposure - Never Smoker    Smokeless tobacco: Never Used   Substance Use Topics    Alcohol use: No    Drug use: No         ALLERGIES   No Known Allergies      FAMILY HISTORY     Family History   Problem Relation Age of Onset    Diabetes Mother     High Blood Pressure Mother     Colon Cancer Neg Hx     Breast Cancer Neg Hx     Cancer Neg Hx          PREVIOUS RECORDS   Previous records reviewed: As noted in HPI. PHYSICAL EXAM     ED Triage Vitals [01/30/21 1936]   BP Temp Temp Source Pulse Resp SpO2 Height Weight   125/89 98.3 °F (36.8 °C) Oral 78 18 98 % 5' 4\" (1.626 m) 154 lb (69.9 kg)     Initial vital signs and nursing assessment reviewed and normal. Body mass index is 26.43 kg/m². Pulsoximetry is normal per my interpretation. Additional Vital Signs:  Vitals:    01/30/21 2150   BP: 128/82   Pulse: 76   Resp: 18   Temp:    SpO2: 96%       Physical Exam  Vitals signs and nursing note reviewed. Constitutional:       General: She is not in acute distress. Appearance: Normal appearance. She is not ill-appearing. HENT:      Head: Normocephalic and atraumatic. Nose: Nose normal. No congestion or rhinorrhea. Mouth/Throat:      Mouth: Mucous membranes are moist.      Pharynx: Oropharynx is clear. No oropharyngeal exudate or posterior oropharyngeal erythema. Eyes:      Extraocular Movements: Extraocular movements intact. Pupils: Pupils are equal, round, and reactive to light. Neck:      Musculoskeletal: Normal range of motion and neck supple. Cardiovascular:      Rate and Rhythm: Normal rate and regular rhythm. Pulses: Normal pulses. Heart sounds: Normal heart sounds. Pulmonary:      Effort: Pulmonary effort is normal.      Breath sounds: Normal breath sounds. Abdominal:      General: Abdomen is flat. There is no distension. Palpations: Abdomen is soft. Tenderness: There is abdominal tenderness.       Comments: Patient with a midline surgical incision which is clean dry and intact and with staples in place. Abdomen is appropriately tender to palpation. Musculoskeletal: Normal range of motion. General: No swelling or tenderness. Skin:     General: Skin is warm and dry. Neurological:      General: No focal deficit present. Mental Status: She is alert and oriented to person, place, and time. Mental status is at baseline. Psychiatric:         Mood and Affect: Mood normal.         Behavior: Behavior normal.      Comments: No active auditory or visual hallucinations. Patient resting comfortably in bed and in no acute distress and not exhibiting agitated or violent behavior. MEDICAL DECISION MAKING   Initial Assessment: 26-year-old female presented to the emergency department for evaluation of behavior problem. Plan: Medical clearance labs, NADIR evaluation.         ED RESULTS   Laboratory results:  Labs Reviewed   CBC WITH AUTO DIFFERENTIAL - Abnormal; Notable for the following components:       Result Value    RBC 4.07 (*)     RDW-SD 45.4 (*)     All other components within normal limits   URINE WITH REFLEXED MICRO - Abnormal; Notable for the following components:    Ketones, Urine 40 (*)     Blood, Urine LARGE (*)     Protein, UA 30 (*)     Leukocyte Esterase, Urine MODERATE (*)     Character, Urine CLOUDY (*)     All other components within normal limits   SALICYLATE LEVEL - Abnormal; Notable for the following components:    Salicylate, Serum < 0.3 (*)     All other components within normal limits   CULTURE, REFLEXED, URINE    Narrative:     Source: urine, clean catch       Site:           Current Antibiotics: not stated   COMPREHENSIVE METABOLIC PANEL   ETHANOL   URINE DRUG SCREEN   TSH WITHOUT REFLEX   ANION GAP   GLOMERULAR FILTRATION RATE, ESTIMATED   OSMOLALITY   HCG, SERUM, QUALITATIVE   ACETAMINOPHEN LEVEL       Radiologic studies results:  No orders to display       ED Medications administered this visit:   Medications   haloperidol lactate (HALDOL) injection 5 mg (5 mg Intramuscular Given 1/31/21 0141)         ED COURSE     ED Course as of Jan 31 0216   Sun Jan 31, 2021   0002 Medically stable for [facility] AN [facility]FFIL[facility]ATE OF [facility] evaluation    [DO]   0130 After being told that the patient did not meet inpatient psychiatric criteria and was to be discharged home she became agitated and began throwing the monitor cables and her pillows off the bed. She additionally scratched one of her family members on the arm when they attempted to put her socks and shoes on. The patient was not observed to have this behavior earlier in her emergency department course. Will order 5 mg of Haldol and plan to discharge the patient home. Discharge plan was discussed with ED attending physician Dr. Donato Morataya. [DO]      ED Course User Index  [DO] Nic Ritter DO       Strict return precautions and follow up instructions were discussed with the patient prior to discharge, with which the patient agrees. MEDICATION CHANGES     New Prescriptions    No medications on file         FINAL DISPOSITION   Work-up in the emergency department was remarkable for unremarkable medical clearance labs. [facility] evaluated the patient and did not recommend inpatient psychiatric admission. Given that this is the patient's second presentation to the emergency department for this complaint and because the patient's family felt that they were unable to adequately handle her symptoms at home, we attempted to arrange transfer to the [facility]. The patient's family however, reported not wanting to have the patient transferred to [facility] because of concerns related to her being in contact with the other [facility] patients and due to their prior experiences at [address]. After being told the patient did not meet inpatient psychiatric criteria, she was observed to have agitated behavior and was given a 5 mg IM dose of Haldol prior to discharge.     The patient and patient's family are instructed to follow-up with her primary care provider and with Prashanth as an outpatient regarding their emergency department visit today. The patient's family report that they have a ready contacted Prashanth and are expecting a follow-up call on Monday. The patient was subsequently discharged home from the emergency department. Case was signed out to ED attending physician Dr. Vicente Olivas at shift change. Final diagnoses:   Behavior problem, adult     Condition: condition: good  Dispo: Discharge to home      This transcription was electronically signed. Parts of this transcriptions may have been dictated by use of voice recognition software and electronically transcribed, and parts may have been transcribed with the assistance of an ED scribe. The transcription may contain errors not detected in proofreading. Please refer to my supervising physician's documentation if my documentation differs.     Electronically Signed: Rashmi Houston, 01/31/21, 2:16 AM         Rashmi Houston, DO  Resident  01/31/21 100 Tommie Xie DO  Resident  01/31/21 5181

## 2021-01-31 NOTE — PROGRESS NOTES
Provisional Diagnosis:    Unspecified mood disorder     Risk, Psychosocial and Contextual Factors:  Psych history, no identified triggers, combative behavior, MRDD     Current MH Treatment:    Patient reports she is a client of [facility], patient is prescribed Xanax Seroquel, Zoloft       Present Suicidal Behavior:      Verbal:  Denied         Attempt: Denied     Access to Weapons:  Denied     Current Suicide Risk: Low, Moderate or High:  Low       Past Suicidal Behavior:       Verbal:  Denied     Attempt: Denied     Self-Injurious/Self-Mutilation: Denied     Traumatic Event Within Past 2 Weeks:   Patient's family reports patient had her third surgery on bowels. Current Abuse: Denied     Legal: Denied     Violence: Family reports Irritable. Impulsive, Combative. Clinician did not observe these behaviors in ED     Protective Factors:  Family support, music, adequate housing    Housing:    Patient lives with her mother in their own home. CPAP/Oxygen/Ambulation Difficulties: Denied     Basic Vital Signs Normal?: Check with Patients Nurse prior to Calling Psychiatry    Critical Labs?: Check with Patients Nurse prior to Calling Psychiatry    Clinical Summary:      Patient is a 31year old  female who presents to the ED voluntarily reporting combative behavior with no identified triggers. Family reports that patient kicking, hitting, flighting, spitting, hitting. Patients reports that she is throwing her medication on the floor. Patient states that she has poured pop all over herself. Patient reports that she may go to long term because of it. Family reports that patient does not have the ability to articulate why these behaviors have occurred. Family reports that these behaviors have escalated since Thursday. Family reports that patient has exhibited these behavior in the past or when she get aggravated over the past 15 year. Family report that sometimes she becomes fixated on names and places.    Family reports that patient sees psych at [facility] and the [facility]. Family reports recent medication change from 300mg to 400mg at the beginning of January. Family noticed significant improvement. Family reports that on Friday mother called [facility] and the [facility] and they stated on Monday provider would get in contact with them. Patient has an upcoming appointment in April. Suicidal thoughts and or plans denied. Homicidal thoughts and/or plans denied. No delusions noted. Hallucinations denied. AOD denied. Family would like admission because they cannot manage care at home. Family reports that they cannot keep holding her down with her staples in her stomach. Level of Care Disposition:      2250 Patient assessed. Patient will be passed to oncoming clinician.

## 2021-01-31 NOTE — ED TRIAGE NOTES
Patient to ED for complaint of altered mental status following a surgical procedure. Family at bedside and states that patient is becoming increasingly more violent at home. Per report, patient is hitting, kicking, and pulling their hair. They state that this has happened previously after surgical procedures. They report that they brought her in last night and did not feel that she should be discharged but she was. They are concerned for patient's health and their own safety.

## 2021-01-31 NOTE — ED NOTES
Patient up to restroom to obtain urine specimen, back to bed without difficulty.      Jonathan Erickson RN  01/30/21 0549

## 2021-02-01 LAB
ORGANISM: ABNORMAL
URINE CULTURE REFLEX: ABNORMAL

## 2021-02-02 NOTE — PROGRESS NOTES
Pharmacy Note  ED Culture Follow-up    Rosa Tristan is a 31 y.o. female. Allergies: Patient has no known allergies. Labs:  Lab Results   Component Value Date    BUN 11 01/30/2021    CREATININE 0.7 01/30/2021    WBC 6.3 01/30/2021     Estimated Creatinine Clearance: 112 mL/min (based on SCr of 0.7 mg/dL). Current antimicrobials:   none    ASSESSMENT:  Micro results:   Urine culture: positive for proteus     PLAN:  Need for intervention: No  Discussed with: Jean Pierre Shannon PA-C  Chosen treatment:    No treatment indicated    Patient response:   No need to contact patient    Called/sent in prescription to: Not applicable    Please call with any questions.  Carter Farah, PharmD 5:29 PM 2/2/2021

## 2021-02-03 ENCOUNTER — HOSPITAL ENCOUNTER (EMERGENCY)
Age: 31
Discharge: HOME | End: 2021-02-04
Attending: EMERGENCY MEDICINE
Payer: COMMERCIAL

## 2021-02-03 ENCOUNTER — APPOINTMENT (OUTPATIENT)
Dept: GENERAL RADIOLOGY | Age: 31
End: 2021-02-03
Payer: COMMERCIAL

## 2021-02-03 ENCOUNTER — APPOINTMENT (OUTPATIENT)
Dept: CT IMAGING | Age: 31
End: 2021-02-03
Payer: COMMERCIAL

## 2021-02-03 DIAGNOSIS — T50.905A ADVERSE EFFECT OF DRUG, INITIAL ENCOUNTER: Primary | ICD-10-CM

## 2021-02-03 LAB
ALBUMIN SERPL-MCNC: 4.6 G/DL (ref 3.5–5.1)
ALP BLD-CCNC: 73 U/L (ref 38–126)
ALT SERPL-CCNC: 15 U/L (ref 11–66)
AMMONIA: 32 UMOL/L (ref 11–60)
ANION GAP SERPL CALCULATED.3IONS-SCNC: 12 MEQ/L (ref 8–16)
AST SERPL-CCNC: 20 U/L (ref 5–40)
BASOPHILS # BLD: 0.7 %
BASOPHILS ABSOLUTE: 0 THOU/MM3 (ref 0–0.1)
BILIRUB SERPL-MCNC: 0.3 MG/DL (ref 0.3–1.2)
BILIRUBIN DIRECT: < 0.2 MG/DL (ref 0–0.3)
BUN BLDV-MCNC: 12 MG/DL (ref 7–22)
CALCIUM SERPL-MCNC: 9.7 MG/DL (ref 8.5–10.5)
CHLORIDE BLD-SCNC: 101 MEQ/L (ref 98–111)
CO2: 25 MEQ/L (ref 23–33)
CREAT SERPL-MCNC: 0.7 MG/DL (ref 0.4–1.2)
EOSINOPHIL # BLD: 1.3 %
EOSINOPHILS ABSOLUTE: 0.1 THOU/MM3 (ref 0–0.4)
ERYTHROCYTE [DISTWIDTH] IN BLOOD BY AUTOMATED COUNT: 12.8 % (ref 11.5–14.5)
ERYTHROCYTE [DISTWIDTH] IN BLOOD BY AUTOMATED COUNT: 45 FL (ref 35–45)
GFR SERPL CREATININE-BSD FRML MDRD: > 90 ML/MIN/1.73M2
GLUCOSE BLD-MCNC: 110 MG/DL (ref 70–108)
HCT VFR BLD CALC: 42.8 % (ref 37–47)
HEMOGLOBIN: 13.9 GM/DL (ref 12–16)
IMMATURE GRANS (ABS): 0.03 THOU/MM3 (ref 0–0.07)
IMMATURE GRANULOCYTES: 0.4 %
LIPASE: 56.2 U/L (ref 5.6–51.3)
LYMPHOCYTES # BLD: 27.6 %
LYMPHOCYTES ABSOLUTE: 1.9 THOU/MM3 (ref 1–4.8)
MAGNESIUM: 2 MG/DL (ref 1.6–2.4)
MCH RBC QN AUTO: 31.2 PG (ref 26–33)
MCHC RBC AUTO-ENTMCNC: 32.5 GM/DL (ref 32.2–35.5)
MCV RBC AUTO: 96.2 FL (ref 81–99)
MONOCYTES # BLD: 6.8 %
MONOCYTES ABSOLUTE: 0.5 THOU/MM3 (ref 0.4–1.3)
NUCLEATED RED BLOOD CELLS: 0 /100 WBC
OSMOLALITY CALCULATION: 276.1 MOSMOL/KG (ref 275–300)
PLATELET # BLD: 347 THOU/MM3 (ref 130–400)
PMV BLD AUTO: 11.4 FL (ref 9.4–12.4)
POTASSIUM SERPL-SCNC: 3.9 MEQ/L (ref 3.5–5.2)
PREGNANCY, SERUM: NEGATIVE
RBC # BLD: 4.45 MILL/MM3 (ref 4.2–5.4)
SEG NEUTROPHILS: 63.2 %
SEGMENTED NEUTROPHILS ABSOLUTE COUNT: 4.4 THOU/MM3 (ref 1.8–7.7)
SODIUM BLD-SCNC: 138 MEQ/L (ref 135–145)
TOTAL PROTEIN: 7.6 G/DL (ref 6.1–8)
TROPONIN T: < 0.01 NG/ML
WBC # BLD: 6.9 THOU/MM3 (ref 4.8–10.8)

## 2021-02-03 PROCEDURE — 82248 BILIRUBIN DIRECT: CPT

## 2021-02-03 PROCEDURE — 99284 EMERGENCY DEPT VISIT MOD MDM: CPT

## 2021-02-03 PROCEDURE — 36415 COLL VENOUS BLD VENIPUNCTURE: CPT

## 2021-02-03 PROCEDURE — 83735 ASSAY OF MAGNESIUM: CPT

## 2021-02-03 PROCEDURE — 81001 URINALYSIS AUTO W/SCOPE: CPT

## 2021-02-03 PROCEDURE — 70450 CT HEAD/BRAIN W/O DYE: CPT

## 2021-02-03 PROCEDURE — 83690 ASSAY OF LIPASE: CPT

## 2021-02-03 PROCEDURE — 71045 X-RAY EXAM CHEST 1 VIEW: CPT

## 2021-02-03 PROCEDURE — 84484 ASSAY OF TROPONIN QUANT: CPT

## 2021-02-03 PROCEDURE — 80053 COMPREHEN METABOLIC PANEL: CPT

## 2021-02-03 PROCEDURE — 96374 THER/PROPH/DIAG INJ IV PUSH: CPT

## 2021-02-03 PROCEDURE — 84703 CHORIONIC GONADOTROPIN ASSAY: CPT

## 2021-02-03 PROCEDURE — 82140 ASSAY OF AMMONIA: CPT

## 2021-02-03 PROCEDURE — 85025 COMPLETE CBC W/AUTO DIFF WBC: CPT

## 2021-02-03 RX ORDER — HALOPERIDOL 5 MG
5 TABLET ORAL 2 TIMES DAILY PRN
COMMUNITY
End: 2021-02-05

## 2021-02-03 ASSESSMENT — ENCOUNTER SYMPTOMS
SHORTNESS OF BREATH: 0
COUGH: 0
ABDOMINAL DISTENTION: 0
EYE PAIN: 0
WHEEZING: 0
TROUBLE SWALLOWING: 0
SINUS PRESSURE: 0
SORE THROAT: 0
PHOTOPHOBIA: 0
DIARRHEA: 0
EYE DISCHARGE: 0
NAUSEA: 0
ABDOMINAL PAIN: 0
CONSTIPATION: 0
EYE REDNESS: 0
BACK PAIN: 0
BLOOD IN STOOL: 0
RHINORRHEA: 0
CHOKING: 0
VOMITING: 0
EYE ITCHING: 0
VOICE CHANGE: 0
CHEST TIGHTNESS: 0

## 2021-02-03 ASSESSMENT — PAIN DESCRIPTION - PAIN TYPE: TYPE: ACUTE PAIN

## 2021-02-04 VITALS
RESPIRATION RATE: 24 BRPM | OXYGEN SATURATION: 95 % | SYSTOLIC BLOOD PRESSURE: 119 MMHG | TEMPERATURE: 98.4 F | WEIGHT: 154 LBS | HEIGHT: 64 IN | DIASTOLIC BLOOD PRESSURE: 78 MMHG | HEART RATE: 87 BPM | BODY MASS INDEX: 26.29 KG/M2

## 2021-02-04 LAB
BACTERIA: ABNORMAL /HPF
BILIRUBIN URINE: NEGATIVE
BLOOD, URINE: NEGATIVE
CASTS 2: ABNORMAL /LPF
CASTS UA: ABNORMAL /LPF
CHARACTER, URINE: ABNORMAL
COLOR: YELLOW
CRYSTALS, UA: ABNORMAL
EPITHELIAL CELLS, UA: ABNORMAL /HPF
GLUCOSE URINE: NEGATIVE MG/DL
KETONES, URINE: NEGATIVE
LEUKOCYTE ESTERASE, URINE: NEGATIVE
MISCELLANEOUS 2: ABNORMAL
MUCUS: ABNORMAL
NITRITE, URINE: NEGATIVE
PH UA: 5 (ref 5–9)
PROTEIN UA: ABNORMAL
RBC URINE: ABNORMAL /HPF
RENAL EPITHELIAL, UA: ABNORMAL
SPECIFIC GRAVITY, URINE: 1.03 (ref 1–1.03)
UROBILINOGEN, URINE: 1 EU/DL (ref 0–1)
WBC UA: ABNORMAL /HPF
YEAST: ABNORMAL

## 2021-02-04 PROCEDURE — 6370000000 HC RX 637 (ALT 250 FOR IP): Performed by: EMERGENCY MEDICINE

## 2021-02-04 PROCEDURE — 96374 THER/PROPH/DIAG INJ IV PUSH: CPT

## 2021-02-04 PROCEDURE — 6360000002 HC RX W HCPCS: Performed by: EMERGENCY MEDICINE

## 2021-02-04 RX ORDER — BENZTROPINE MESYLATE 1 MG/ML
1 INJECTION INTRAMUSCULAR; INTRAVENOUS ONCE
Status: COMPLETED | OUTPATIENT
Start: 2021-02-04 | End: 2021-02-04

## 2021-02-04 RX ORDER — ALPRAZOLAM 0.5 MG/1
0.5 TABLET ORAL ONCE
Status: COMPLETED | OUTPATIENT
Start: 2021-02-04 | End: 2021-02-04

## 2021-02-04 RX ADMIN — ALPRAZOLAM 0.5 MG: 0.5 TABLET ORAL at 01:06

## 2021-02-04 RX ADMIN — BENZTROPINE MESYLATE 1 MG: 1 INJECTION INTRAMUSCULAR; INTRAVENOUS at 00:58

## 2021-02-04 NOTE — PROGRESS NOTES
Rosa Mcfadden. [facility], [address] [place] [address]. [address]  [place] [address]  [phone]  Post Procedure Evaluation in Office    Pt Name: Rosa Tristan  Date of Birth 1990   Today's Date: 2/5/2021  Medical Record Number: [id number]  Referring Provider: No ref. provider found  Primary Care Provider: KAYLI Hardin CNP  Chief Complaint   Patient presents with    Post-Op Check     s/p Exploratory laparotomy, adhesiolysis-1/21/2021 Last seen in the office on 1/29/2021     ASSESSMENT       Diagnosis Orders   1. Partial small bowel obstruction (HCC)      S/P release of SBO 1/21/21   Incision is clean, dry and intact or healing as expected   PLANS      Pathology reviewed with the patient who understands. All questions were answered. Patient Instructions   No lifting, pushing or pulling more than 30 lbs for 2 weeks, then 50 lbs for 2 weeks, then 80 lbs for 2 weeks. No restrictions after 6 weeks. Remaining staples removed. Follow up: Return for As needed. Instructed to call if any concerns. Jamar Basurto is seen today for post-op follow-up. She is S/P release of SBO 1/21/21. She is tolerating a regular diet, having regular bowel movements. Symptoms and activity have gradually improved compared to preoperative. The surgical site is clean and has no drainage. Pain is controlled without any narcotic pain medications. She has compliant with postoperative instructions. Past Medical History   has a past medical history of Anemia, Asthma, Cerebral palsy ([address] COPD (chronic obstructive pulmonary disease) ([address] GERD (gastroesophageal reflux disease), Heart attack ([address] Hyperlipidemia, Mental retardation, and Schizophrenia simplex ([address]   Past Surgical History has a past surgical history that includes Gastric fundoplication; laparotomy (3/25/16); Colon surgery; Abdominal exploration surgery (05/27/2016); Eye surgery (1996); Colonoscopy; Colonoscopy (N/A, 4/25/2019); Abdomen surgery; and laparotomy (N/A, 1/21/2021). Medications  has a current medication list which includes the following prescription(s): docusate, omeprazole, acetaminophen, midodrine, metoprolol tartrate, pravastatin, quetiapine, polyethylene glycol, etonogestrel, sertraline hcl, alprazolam, albuterol, budesonide, and benzonatate. Allergies  is allergic to haldol [haloperidol]. Social History   reports that she is a non-smoker but has been exposed to tobacco smoke. She has never used smokeless tobacco. She reports that she does not drink alcohol or use drugs. Health Screening Exams  Health Maintenance   Topic Date Due    Hepatitis C screen  1990    Varicella vaccine (1 of 2 - 2-dose childhood series) 03/28/1990    Pneumococcal 0-64 years Vaccine (1 of 1 - PPSV23) 03/28/1995    HIV screen  03/28/2004    DTaP/Tdap/Td vaccine (1 - Tdap) 03/28/2008    Cervical cancer screen  03/28/2010    Lipid screen  03/27/2020    Flu vaccine (1) 09/01/2020    Hepatitis A vaccine  Aged Out    Hepatitis B vaccine  Aged Out    Hib vaccine  Aged Out    Meningococcal (ACWY) vaccine  Aged Out     Review of Systems  History obtained from the patient. Constitutional: Denies any fever, chills, fatigue. Wound: Denies any rash, skin color changes or wound problems. Resp: Denies any cough, shortness of breath. CV: Denies any chest pain, orthopnea or syncope. GI: Denies any nausea, vomiting, blood in the stool, constipation or diarrhea. Positive for incisional discomfort only. : Denies any hematuria, hesitancy or dysuria.       OBJECTIVE VITALS:  weight is 149 lb (67.6 kg). Her temporal temperature is 97.1 °F (36.2 °C). Her blood pressure is 112/78 and her pulse is 78. Her respiration is 18 and oxygen saturation is 98%. Pain Score:   0 - No pain  CONSTITUTIONAL: Alert and oriented times 3, no acute distress and cooperative to examination. SKIN: Skin color, texture, turgor normal. No rashes or lesions. INCISION: wound margins intact and healing well. No signs of infection. No drainage. LUNGS: Chest expands equally bilaterally upon respiration, no accessory muscle used. Ausculation reveals no wheezes, rales or rhonchi. CARDIOVASCULAR: Heart sounds are normal.  Regular rate and rhythm without murmur, gallop or rub. Normal S1 and S2.  ABDOMEN: Soft, nondistended, no masses or organomegaly. Bowel sounds normal. laparotomy scar present with prominent healing ridge. No sign of recurrence, hematoma or seroma. Tenderness to palpation incisional only. Remaining staples removed without incident. Katerine Newton NEUROLOGIC: No sensory or motor nerve irritation       Thank you for the interesting evaluation. Further recommendations as listed above.        Electronically signed by Tiny Lam DO on 2/5/2021 at 12:41 PM

## 2021-02-04 NOTE — ED NOTES
Bed: 012A  Expected date: 2/3/21  Expected time: 10:22 PM  Means of arrival: [facility]  Comments:     Savage Roa RN  02/03/21 6912

## 2021-02-04 NOTE — ED PROVIDER NOTES
[facility]  eMERGENCY dEPARTMENT eNCOUnter          CHIEF COMPLAINT       Chief Complaint   Patient presents with    Headache    Dizziness    Tremors       Nurses Notes reviewed and I agree except as noted in the HPI. HISTORY OF PRESENT ILLNESS    Hannah Lucas is a 31 y.o. female who presents mild headache, involuntary facial movements changed posture mild shaking of the hands. Patient was recently started on Cipro for a urinary tract infection. She was also recently started on Haldol. Caregiver at bedside cannot remember when this started although Cipro was only started yesterday and she thinks that she had these problems beforehand. Apparently because of behavioral problems the patient was started on Haldol. Patient is currently also on Seroquel Zoloft and Xanax. Patient has not had any falls. She feels steady on her feet. She has had no chest pain shortness of breath abdominal pain or changes in bowel or bladder habits. Currently the patient is resting comfortably on the cot no apparent distress. REVIEW OF SYSTEMS     Review of Systems   Constitutional: Negative for activity change, appetite change, diaphoresis, fatigue and unexpected weight change. HENT: Negative for congestion, ear discharge, ear pain, hearing loss, rhinorrhea, sinus pressure, sore throat, trouble swallowing and voice change. Eyes: Negative for photophobia, pain, discharge, redness and itching. Respiratory: Negative for cough, choking, chest tightness, shortness of breath and wheezing. Cardiovascular: Negative for chest pain, palpitations and leg swelling. Gastrointestinal: Negative for abdominal distention, abdominal pain, blood in stool, constipation, diarrhea, nausea and vomiting. Endocrine: Negative for polydipsia, polyphagia and polyuria. Genitourinary: Negative for decreased urine volume, difficulty urinating, dysuria, enuresis, frequency, hematuria and urgency.    Musculoskeletal: Negative for arthralgias, back pain, gait problem, myalgias, neck pain and neck stiffness. Skin: Negative for pallor and rash. Allergic/Immunologic: Negative for immunocompromised state. Neurological: Positive for headaches. Negative for dizziness, tremors, seizures, syncope, facial asymmetry, weakness, light-headedness and numbness. Involuntary movements changed posture mild shaking of hands   Hematological: Negative for adenopathy. Does not bruise/bleed easily. Psychiatric/Behavioral: Negative for agitation, hallucinations and suicidal ideas. The patient is not nervous/anxious. PAST MEDICAL HISTORY    has a past medical history of Anemia, Asthma, Cerebral palsy ([facility] [address] COPD (chronic obstructive pulmonary disease) ([facility] [address] GERD (gastroesophageal reflux disease), Heart attack ([facility] [address] Hyperlipidemia, Mental retardation, and Schizophrenia simplex ([facility] [address] SURGICAL HISTORY      has a past surgical history that includes Gastric fundoplication; laparotomy (3/25/16); Colon surgery; Abdominal exploration surgery (05/27/2016); Eye surgery (1996); Colonoscopy; Colonoscopy (N/A, 4/25/2019); Abdomen surgery; and laparotomy (N/A, 1/21/2021).     CURRENT MEDICATIONS       Discharge Medication List as of 2/4/2021  1:04 AM      CONTINUE these medications which have NOT CHANGED    Details   haloperidol (HALDOL) 5 MG tablet Take 5 mg by mouth 2 times daily as needed for AgitationHistorical Med      docusate sodium (COLACE, DULCOLAX) 100 MG CAPS Take 100 mg by mouth dailyOTC      omeprazole (PRILOSEC) 20 MG delayed release capsule Take 20 mg by mouth dailyHistorical Med      acetaminophen (TYLENOL) 500 MG tablet Take 500 mg by mouth every 6 hours as needed for PainHistorical Med      midodrine (PROAMATINE) 5 MG tablet take 1 tablet by mouth twice a day, Disp-180 tablet,R-3Normal      metoprolol tartrate (LOPRESSOR) 25 MG tablet Take 1 tablet by mouth 2 times daily, Disp-180 tablet,R-2Normal      pravastatin (PRAVACHOL) 20 MG tablet take 1 tablet by mouth once daily at bedtimeHistorical Med      benzonatate (TESSALON) 200 MG capsule Take 1 capsule by mouth 3 times daily as needed for Cough (Swallow whole, do not chew) Swallow whole, do not chew, Disp-15 capsule, R-0Print      albuterol (PROVENTIL) (2.5 MG/3ML) 0.083% nebulizer solution inhale contents of 1 vial in nebulizer every 6 hours if needed FOR WHEEZING OR SHORTNESS OF BREATH, Disp-375 mL, R-11Normal      budesonide (PULMICORT) 0.5 MG/2ML nebulizer suspension inhale contents of 1 vial in nebulizer twice a day, Disp-120 mL, R-11Normal      QUEtiapine (SEROQUEL) 300 MG tablet Take 400 mg by mouth daily Historical Med      polyethylene glycol (GLYCOLAX) powder Dispense 238 Gram Bottle. Use as Directed, Disp-238 g, R-0Normal      etonogestrel (NEXPLANON) 68 MG implant 68 mg by Subdermal route once, Subdermal, ONCE, Historical Med      Sertraline HCl (ZOLOFT PO) Take 150 mg by mouth every morning Historical Med      ALPRAZolam (XANAX PO) Take 1 mg by mouth 3 times daily Historical Med             ALLERGIES     has No Known Allergies. FAMILY HISTORY     She indicated that her mother is alive. She indicated that her father is alive. She indicated that the status of her neg hx is unknown.   family history includes Diabetes in her mother; High Blood Pressure in her mother. SOCIAL HISTORY      reports that she is a non-smoker but has been exposed to tobacco smoke. She has never used smokeless tobacco. She reports that she does not drink alcohol or use drugs. PHYSICAL EXAM     INITIAL VITALS:  height is 5' 4\" (1.626 m) and weight is 154 lb (69.9 kg). Her oral temperature is 98.4 °F (36.9 °C). Her blood pressure is 119/78 and her pulse is 87. Her respiration is 24 and oxygen saturation is 95%. Physical Exam  Vitals signs and nursing note reviewed. Constitutional:       General: She is not in acute distress. Appearance: She is well-developed.  She is not diaphoretic. HENT:      Head: Normocephalic and atraumatic. Right Ear: Hearing, tympanic membrane, ear canal and external ear normal. No hemotympanum. Left Ear: Hearing, tympanic membrane, ear canal and external ear normal. No hemotympanum. Nose: Nose normal.      Mouth/Throat:      Lips: Pink. Mouth: Mucous membranes are moist.      Tongue: No lesions. Palate: No mass. Pharynx: Oropharynx is clear. No oropharyngeal exudate or posterior oropharyngeal erythema. Tonsils: No tonsillar exudate. Eyes:      General: Lids are everted, no foreign bodies appreciated. No scleral icterus. Right eye: No discharge. Left eye: No discharge. Extraocular Movements: Extraocular movements intact. Right eye: Normal extraocular motion and no nystagmus. Left eye: Normal extraocular motion and no nystagmus. Conjunctiva/sclera: Conjunctivae normal.      Pupils: Pupils are equal, round, and reactive to light. Neck:      Musculoskeletal: Normal range of motion and neck supple. Thyroid: No thyromegaly. Vascular: No JVD. Trachea: No tracheal deviation. Cardiovascular:      Rate and Rhythm: Normal rate and regular rhythm. Heart sounds: Normal heart sounds, S1 normal and S2 normal. No murmur. No friction rub. No gallop. Pulmonary:      Effort: Pulmonary effort is normal.      Breath sounds: Normal breath sounds. No stridor. No wheezing, rhonchi or rales. Chest:      Chest wall: No tenderness. Abdominal:      General: Bowel sounds are normal. There is no distension. Palpations: Abdomen is soft. There is no mass. Tenderness: There is no abdominal tenderness. There is no guarding or rebound. Hernia: No hernia is present. Musculoskeletal: Normal range of motion. General: No tenderness. Lymphadenopathy:      Cervical: No cervical adenopathy. Skin:     General: Skin is warm and dry.       Findings: No bruising, ecchymosis, lesion or rash. Neurological:      Mental Status: She is alert and oriented to person, place, and time. GCS: GCS eye subscore is 4. GCS verbal subscore is 5. GCS motor subscore is 6. Cranial Nerves: Cranial nerves are intact. No cranial nerve deficit. Sensory: Sensation is intact. Motor: Motor function is intact. Coordination: Coordination is intact. Coordination normal.      Gait: Gait is intact. Deep Tendon Reflexes: Reflexes are normal and symmetric. Reflex Scores:       Tricep reflexes are 2+ on the right side and 2+ on the left side. Bicep reflexes are 2+ on the right side and 2+ on the left side. Brachioradialis reflexes are 2+ on the right side and 2+ on the left side. Patellar reflexes are 2+ on the right side and 2+ on the left side. Achilles reflexes are 2+ on the right side and 2+ on the left side. Comments: No active involuntary movements at this time. Psychiatric:         Speech: Speech normal.         Behavior: Behavior normal.         Thought Content: Thought content normal.         Judgment: Judgment normal.           DIFFERENTIAL DIAGNOSIS:   Tardive dyskinesia, medication reaction, electrolyte disorder, kidney dysfunction liver dysfunction less likely stroke or seizure. DIAGNOSTIC RESULTS     EKG: All EKG's are interpreted by the Emergency Department Physician who either signs or Co-signs this chart in the absence of a cardiologist.  None    RADIOLOGY: non-plain film images(s) such as CT, Ultrasound and MRI are read by the radiologist.  CT HEAD WO CONTRAST   Final Result   Impression:   Mild nonspecific periventricular and deep white matter disease.       This document has been electronically signed by: Krupa Graves MD on    02/04/2021 12:10 AM      All CT scans at this facility use dose modulation, iterative    reconstruction, and/or weight-based   dosing when appropriate to reduce radiation dose to as low as reasonably    achievable. XR CHEST PORTABLE   Final Result   Vague airspace filling within the left lower lung, suspicious for    pneumonia. This document has been electronically signed by: Yusuf Astorga MD on    02/03/2021 11:11 PM             LABS:   Labs Reviewed   BASIC METABOLIC PANEL - Abnormal; Notable for the following components:       Result Value    Glucose 110 (*)     All other components within normal limits   LIPASE - Abnormal; Notable for the following components:    Lipase 56.2 (*)     All other components within normal limits   URINE WITH REFLEXED MICRO - Abnormal; Notable for the following components:    Protein, UA TRACE (*)     All other components within normal limits   CBC WITH AUTO DIFFERENTIAL   HEPATIC FUNCTION PANEL   TROPONIN   MAGNESIUM   HCG, SERUM, QUALITATIVE   AMMONIA   ANION GAP   GLOMERULAR FILTRATION RATE, ESTIMATED   OSMOLALITY       EMERGENCY DEPARTMENT COURSE:   Vitals:    Vitals:    02/03/21 2233 02/03/21 2308 02/03/21 2342 02/04/21 0103   BP: 117/74  (!) 126/91 119/78   Pulse: 96  97 87   Resp: 20  20 24   Temp:  98.4 °F (36.9 °C)     TempSrc:  Oral     SpO2: 96%  96% 95%   Weight:  154 lb (69.9 kg)     Height:  5' 4\" (1.626 m)       Patient was assessed at bedside appropriate labs and imaging were ordered. I fear that the patient may have tardive dyskinesia secondary to being on 2 antipsychotics. In any event I reviewed all the labs and imaging. They were all within normal limits. Patient was given Cogentin here as well as Xanax. She takes Xanax at home. I instructed the family to follow-up with a psychiatrist so that they could discuss changing the medications or changing the dosages. Patient's family understood and agreed with the plan. Patient is subsequently discharged home in mother's care in good condition. Patient has what appears to be a medication reaction this is most likely tardive dyskinesia from her antipsychotics.   Caregivers are

## 2021-02-05 ENCOUNTER — OFFICE VISIT (OUTPATIENT)
Dept: SURGERY | Age: 31
End: 2021-02-05

## 2021-02-05 ENCOUNTER — TELEPHONE (OUTPATIENT)
Dept: PULMONOLOGY | Age: 31
End: 2021-02-05

## 2021-02-05 VITALS
TEMPERATURE: 97.1 F | BODY MASS INDEX: 25.58 KG/M2 | WEIGHT: 149 LBS | DIASTOLIC BLOOD PRESSURE: 78 MMHG | HEART RATE: 78 BPM | RESPIRATION RATE: 18 BRPM | SYSTOLIC BLOOD PRESSURE: 112 MMHG | OXYGEN SATURATION: 98 %

## 2021-02-05 DIAGNOSIS — K56.600 PARTIAL SMALL BOWEL OBSTRUCTION (HCC): Primary | ICD-10-CM

## 2021-02-05 DIAGNOSIS — J45.909 MODERATE ASTHMA WITHOUT COMPLICATION, UNSPECIFIED WHETHER PERSISTENT: ICD-10-CM

## 2021-02-05 PROCEDURE — 99024 POSTOP FOLLOW-UP VISIT: CPT | Performed by: SURGERY

## 2021-02-05 RX ORDER — ALBUTEROL SULFATE 2.5 MG/3ML
SOLUTION RESPIRATORY (INHALATION)
Qty: 375 ML | Refills: 11 | Status: SHIPPED | OUTPATIENT
Start: 2021-02-05

## 2021-02-05 RX ORDER — BUDESONIDE 0.5 MG/2ML
INHALANT ORAL
Qty: 120 ML | Refills: 11 | Status: SHIPPED | OUTPATIENT
Start: 2021-02-05 | End: 2021-05-06 | Stop reason: SDUPTHER

## 2021-02-07 ENCOUNTER — HOSPITAL ENCOUNTER (EMERGENCY)
Age: 31
Discharge: HOME | DRG: 753 | End: 2021-02-07
Attending: FAMILY MEDICINE
Payer: COMMERCIAL

## 2021-02-07 ENCOUNTER — APPOINTMENT (OUTPATIENT)
Dept: CT IMAGING | Age: 31
DRG: 753 | End: 2021-02-07
Payer: COMMERCIAL

## 2021-02-07 VITALS
BODY MASS INDEX: 24.83 KG/M2 | SYSTOLIC BLOOD PRESSURE: 123 MMHG | HEART RATE: 80 BPM | DIASTOLIC BLOOD PRESSURE: 77 MMHG | RESPIRATION RATE: 18 BRPM | TEMPERATURE: 98.7 F | WEIGHT: 149 LBS | OXYGEN SATURATION: 98 % | HEIGHT: 65 IN

## 2021-02-07 DIAGNOSIS — K45.8 INTERNAL HERNIA: ICD-10-CM

## 2021-02-07 DIAGNOSIS — R10.84 GENERALIZED ABDOMINAL PAIN: Primary | ICD-10-CM

## 2021-02-07 LAB
ALBUMIN SERPL-MCNC: 4.6 G/DL (ref 3.5–5.1)
ALP BLD-CCNC: 69 U/L (ref 38–126)
ALT SERPL-CCNC: 18 U/L (ref 11–66)
ANION GAP SERPL CALCULATED.3IONS-SCNC: 12 MEQ/L (ref 8–16)
AST SERPL-CCNC: 20 U/L (ref 5–40)
BASOPHILS # BLD: 1.1 %
BASOPHILS ABSOLUTE: 0.1 THOU/MM3 (ref 0–0.1)
BILIRUB SERPL-MCNC: 0.6 MG/DL (ref 0.3–1.2)
BUN BLDV-MCNC: 9 MG/DL (ref 7–22)
CALCIUM SERPL-MCNC: 9.5 MG/DL (ref 8.5–10.5)
CHLORIDE BLD-SCNC: 103 MEQ/L (ref 98–111)
CO2: 25 MEQ/L (ref 23–33)
CREAT SERPL-MCNC: 0.8 MG/DL (ref 0.4–1.2)
EOSINOPHIL # BLD: 1.8 %
EOSINOPHILS ABSOLUTE: 0.1 THOU/MM3 (ref 0–0.4)
ERYTHROCYTE [DISTWIDTH] IN BLOOD BY AUTOMATED COUNT: 12.6 % (ref 11.5–14.5)
ERYTHROCYTE [DISTWIDTH] IN BLOOD BY AUTOMATED COUNT: 44 FL (ref 35–45)
GFR SERPL CREATININE-BSD FRML MDRD: 83 ML/MIN/1.73M2
GLUCOSE BLD-MCNC: 106 MG/DL (ref 70–108)
HCT VFR BLD CALC: 42.9 % (ref 37–47)
HEMOGLOBIN: 14.1 GM/DL (ref 12–16)
IMMATURE GRANS (ABS): 0.01 THOU/MM3 (ref 0–0.07)
IMMATURE GRANULOCYTES: 0.2 %
LYMPHOCYTES # BLD: 33 %
LYMPHOCYTES ABSOLUTE: 1.8 THOU/MM3 (ref 1–4.8)
MCH RBC QN AUTO: 31.1 PG (ref 26–33)
MCHC RBC AUTO-ENTMCNC: 32.9 GM/DL (ref 32.2–35.5)
MCV RBC AUTO: 94.7 FL (ref 81–99)
MONOCYTES # BLD: 8.3 %
MONOCYTES ABSOLUTE: 0.5 THOU/MM3 (ref 0.4–1.3)
NUCLEATED RED BLOOD CELLS: 0 /100 WBC
OSMOLALITY CALCULATION: 278.5 MOSMOL/KG (ref 275–300)
PLATELET # BLD: 310 THOU/MM3 (ref 130–400)
PMV BLD AUTO: 11.7 FL (ref 9.4–12.4)
POTASSIUM REFLEX MAGNESIUM: 3.7 MEQ/L (ref 3.5–5.2)
PREGNANCY, SERUM: NEGATIVE
RBC # BLD: 4.53 MILL/MM3 (ref 4.2–5.4)
SEG NEUTROPHILS: 55.6 %
SEGMENTED NEUTROPHILS ABSOLUTE COUNT: 3.1 THOU/MM3 (ref 1.8–7.7)
SODIUM BLD-SCNC: 140 MEQ/L (ref 135–145)
TOTAL PROTEIN: 8 G/DL (ref 6.1–8)
WBC # BLD: 5.5 THOU/MM3 (ref 4.8–10.8)

## 2021-02-07 PROCEDURE — 96375 TX/PRO/DX INJ NEW DRUG ADDON: CPT

## 2021-02-07 PROCEDURE — 80053 COMPREHEN METABOLIC PANEL: CPT

## 2021-02-07 PROCEDURE — 96374 THER/PROPH/DIAG INJ IV PUSH: CPT

## 2021-02-07 PROCEDURE — 99283 EMERGENCY DEPT VISIT LOW MDM: CPT

## 2021-02-07 PROCEDURE — 85025 COMPLETE CBC W/AUTO DIFF WBC: CPT

## 2021-02-07 PROCEDURE — 74177 CT ABD & PELVIS W/CONTRAST: CPT

## 2021-02-07 PROCEDURE — 36415 COLL VENOUS BLD VENIPUNCTURE: CPT

## 2021-02-07 PROCEDURE — 6360000004 HC RX CONTRAST MEDICATION: Performed by: FAMILY MEDICINE

## 2021-02-07 PROCEDURE — 2580000003 HC RX 258: Performed by: FAMILY MEDICINE

## 2021-02-07 PROCEDURE — 6360000002 HC RX W HCPCS

## 2021-02-07 PROCEDURE — 84703 CHORIONIC GONADOTROPIN ASSAY: CPT

## 2021-02-07 PROCEDURE — 96376 TX/PRO/DX INJ SAME DRUG ADON: CPT

## 2021-02-07 PROCEDURE — 6360000002 HC RX W HCPCS: Performed by: EMERGENCY MEDICINE

## 2021-02-07 RX ORDER — 0.9 % SODIUM CHLORIDE 0.9 %
1000 INTRAVENOUS SOLUTION INTRAVENOUS ONCE
Status: COMPLETED | OUTPATIENT
Start: 2021-02-07 | End: 2021-02-07

## 2021-02-07 RX ORDER — LORAZEPAM 2 MG/ML
1 INJECTION INTRAMUSCULAR ONCE
Status: COMPLETED | OUTPATIENT
Start: 2021-02-07 | End: 2021-02-07

## 2021-02-07 RX ORDER — LORAZEPAM 2 MG/ML
INJECTION INTRAMUSCULAR
Status: COMPLETED
Start: 2021-02-07 | End: 2021-02-07

## 2021-02-07 RX ADMIN — LORAZEPAM 1 MG: 2 INJECTION INTRAMUSCULAR; INTRAVENOUS at 20:52

## 2021-02-07 RX ADMIN — SODIUM CHLORIDE 1000 ML: 9 INJECTION, SOLUTION INTRAVENOUS at 17:43

## 2021-02-07 RX ADMIN — IOPAMIDOL 80 ML: 755 INJECTION, SOLUTION INTRAVENOUS at 19:17

## 2021-02-07 RX ADMIN — LORAZEPAM 1 MG: 2 INJECTION INTRAMUSCULAR; INTRAVENOUS at 21:17

## 2021-02-07 RX ADMIN — LORAZEPAM 1 MG: 2 INJECTION INTRAMUSCULAR at 20:52

## 2021-02-07 NOTE — ED TRIAGE NOTES
Pt to er. Pt c/o lt lower and mid abd pain that started today after eating a bowl of cereal. Family states pt currently has a UTI and is on cipro. States also had bowel obstruction surgery on jan 21st. Denies fevers. Denies emesis or diarrhea. Pt alert. Pt hx of MRDD. Call light in reach.

## 2021-02-07 NOTE — ED PROVIDER NOTES
EMERGENCY DEPARTMENT ENCOUNTER     CHIEF COMPLAINT   Chief Complaint   Patient presents with    Abdominal Pain        HPI   Matthew Modi is a 31 y.o. female with MR and hx of gastric fundoplication and recent hernia repair surgery performed by Dr. Amena Kemp on 1/21, who presents with diffuse abdominal pain, onset was this morning. The duration has been constant since the onset. The patient has no associated nausea or vomiting, and claimed she had an ok bowel movement. She felt much sicker by in January when she had a SBO. There are no alleviating factors. The context is that the symptoms started spontaneously, without any known precipitants. REVIEW OF SYSTEMS   GI: See history of present illness   Cardiac: No chest pain or syncope   Pulmonary: No difficulty breathing or new cough   General: No fevers   : No hematuria or dysuria   See HPI for further details. All other review of systems are reviewed and are otherwise negative.      PAST MEDICAL & SURGICAL HISTORY   Past Medical History:   Diagnosis Date    Anemia     Asthma     Cerebral palsy ([address]     COPD (chronic obstructive pulmonary disease) (HCC)     GERD (gastroesophageal reflux disease)     Heart attack ([facility] [address] 3/2016    Hyperlipidemia     Mental retardation     Schizophrenia simplex ([facility] [address]       Past Surgical History:   Procedure Laterality Date    ABDOMEN SURGERY      ABDOMINAL EXPLORATION SURGERY  05/27/2016    lysis of adhesions 2 times    COLON SURGERY      COLONOSCOPY      COLONOSCOPY N/A 4/25/2019    COLONOSCOPY performed by Sindi Alexander MD at [address]  3/25/16    exploratory, lysis of adhesions, reduction of internal hernia    LAPAROTOMY N/A 1/21/2021    EXPLORATORY LAPAROTOMY, LYSIS OF ADHESIONS performed by Henri Rosa DO at [address]   Current Outpatient Rx   Medication Sig Dispense Refill    albuterol (PROVENTIL) (2.5 MG/3ML) 0.083% nebulizer solution inhale contents of 1 vial in nebulizer every 6 hours if needed FOR WHEEZING OR SHORTNESS OF BREATH 375 mL 11    budesonide (PULMICORT) 0.5 MG/2ML nebulizer suspension inhale contents of 1 vial in nebulizer twice a day 120 mL 11    docusate sodium (COLACE, DULCOLAX) 100 MG CAPS Take 100 mg by mouth daily      omeprazole (PRILOSEC) 20 MG delayed release capsule Take 20 mg by mouth daily      acetaminophen (TYLENOL) 500 MG tablet Take 500 mg by mouth every 6 hours as needed for Pain      midodrine (PROAMATINE) 5 MG tablet take 1 tablet by mouth twice a day 180 tablet 3    metoprolol tartrate (LOPRESSOR) 25 MG tablet Take 1 tablet by mouth 2 times daily 180 tablet 2    pravastatin (PRAVACHOL) 20 MG tablet take 1 tablet by mouth once daily at bedtime      benzonatate (TESSALON) 200 MG capsule Take 1 capsule by mouth 3 times daily as needed for Cough (Swallow whole, do not chew) Swallow whole, do not chew (Patient not taking: Reported on 2/5/2021) 15 capsule 0    QUEtiapine (SEROQUEL) 300 MG tablet Take 400 mg by mouth daily       polyethylene glycol (GLYCOLAX) powder Dispense 238 Gram Bottle.   Use as Directed 238 g 0    etonogestrel (NEXPLANON) 68 MG implant 68 mg by Subdermal route once      Sertraline HCl (ZOLOFT PO) Take 150 mg by mouth every morning       ALPRAZolam (XANAX PO) Take 1 mg by mouth 3 times daily           ALLERGIES   Allergies   Allergen Reactions    Haldol [Haloperidol] Other (See Comments)     tarditive dyskinesia        SOCIAL AND FAMILY HISTORY   Social History     Socioeconomic History    Marital status: Single     Spouse name: None    Number of children: None    Years of education: None    Highest education level: None   Occupational History    None   Social Needs    Financial resource strain: None    Food insecurity     Worry: None     Inability: None    Transportation needs     Medical: None     Non-medical: None   Tobacco Use    Smoking 2. Moderate gaseous distention of several bowel loops, predominantly:, Most consistent with ileus. The previous noted findings of small bowel obstruction not seen on today's study. .   3. Findings of interval abdominal surgery since prior study. **This report has been created using voice recognition software. It may contain minor errors which are inherent in voice recognition technology. **      Final report electronically signed by Dr. Sherwin Boyer on 2/7/2021 7:40 PM           LABS   Labs Reviewed   GLOMERULAR FILTRATION RATE, ESTIMATED - Abnormal; Notable for the following components:       Result Value    Est, Glom Filt Rate 83 (*)     All other components within normal limits   CBC WITH AUTO DIFFERENTIAL   COMPREHENSIVE METABOLIC PANEL W/ REFLEX TO MG FOR LOW K   HCG, SERUM, QUALITATIVE   ANION GAP   OSMOLALITY        ED COURSE & MEDICAL DECISION MAKING   Pertinent Labs & Imaging studies reviewed and interpreted. (See chart for details)   See EMR for medications prescribed   Vitals:    02/07/21 1951   BP: 123/77   Pulse: 80   Resp: 18   Temp:    SpO2: 98%        Differential diagnosis: hernia incarceration, non-specific abdominal pain, Ischemic Bowel, Bowel Obstruction, Acute Cholecystitis, Acute Appendicitis, other     FINAL IMPRESSION   1. Generalized abdominal pain    2. Internal hernia         PLAN   MDM - pt likely has non-specific abdominal pain or adhesions pain. Pt may have SBO due to the pain, but due to her MR status, it is difficult to know the exact circumstances of events. I did hear bowel sounds without tympany. No need for pain control, did advise NPO status as pt was given a norco tablet by her mother Shelby Larson PTA. Re-assessment at [address] discussed case with on-call general surgeon Dr. Edinson Mai, re CT findings. Appear the CT showed stable internal hernia with recent surgery but negative SBO. Pt will be dc home now.  Dr. Edinson Mai recommended regular bowel regimen and that the patient contact Dr. Lalitha Beard office on Monday. Pt was given the results of the workup, as were her parents. Anticipatory guidance given prior to discharge.     Electronically signed by: Ciara Keller MD, 2/7/2021 8:12 PM   (This note was completed with a voice recognition program)         Amy Griffin MD  02/07/21 2012

## 2021-02-07 NOTE — ED NOTES
Pt appears to be resting comfortably in bed with eyes open, family at bedside. Pt mother asked when patient would be brought down for CT, wait time explained, pt is alert and oriented, respirations are equal and unlabored.  Pt denies further needs at this time, will continue to monitor     [facility]  02/07/21 1819

## 2021-02-08 ENCOUNTER — HOSPITAL ENCOUNTER (INPATIENT)
Age: 31
LOS: 14 days | Discharge: HOME | DRG: 753 | End: 2021-02-22
Attending: PSYCHIATRY & NEUROLOGY | Admitting: PSYCHIATRY & NEUROLOGY
Payer: COMMERCIAL

## 2021-02-08 ENCOUNTER — TELEPHONE (OUTPATIENT)
Dept: SURGERY | Age: 31
End: 2021-02-08

## 2021-02-08 DIAGNOSIS — R45.850 HOMICIDAL IDEATION: Primary | ICD-10-CM

## 2021-02-08 DIAGNOSIS — R45.851 SUICIDAL IDEATION: ICD-10-CM

## 2021-02-08 PROBLEM — F32.9 MDD (MAJOR DEPRESSIVE DISORDER), SINGLE EPISODE: Status: ACTIVE | Noted: 2021-02-08

## 2021-02-08 LAB
ACETAMINOPHEN LEVEL: < 5 UG/ML (ref 0–20)
ALBUMIN SERPL-MCNC: 4.3 G/DL (ref 3.5–5.1)
ALP BLD-CCNC: 63 U/L (ref 38–126)
ALT SERPL-CCNC: 18 U/L (ref 11–66)
AMPHETAMINE+METHAMPHETAMINE URINE SCREEN: NEGATIVE
ANION GAP SERPL CALCULATED.3IONS-SCNC: 11 MEQ/L (ref 8–16)
AST SERPL-CCNC: 22 U/L (ref 5–40)
BARBITURATE QUANTITATIVE URINE: NEGATIVE
BASOPHILS # BLD: 0.7 %
BASOPHILS ABSOLUTE: 0.1 THOU/MM3 (ref 0–0.1)
BENZODIAZEPINE QUANTITATIVE URINE: POSITIVE
BILIRUB SERPL-MCNC: 0.6 MG/DL (ref 0.3–1.2)
BUN BLDV-MCNC: 7 MG/DL (ref 7–22)
CALCIUM SERPL-MCNC: 9.2 MG/DL (ref 8.5–10.5)
CANNABINOID QUANTITATIVE URINE: NEGATIVE
CHLORIDE BLD-SCNC: 100 MEQ/L (ref 98–111)
CO2: 26 MEQ/L (ref 23–33)
COCAINE METABOLITE QUANTITATIVE URINE: NEGATIVE
CREAT SERPL-MCNC: 0.7 MG/DL (ref 0.4–1.2)
EOSINOPHIL # BLD: 0.5 %
EOSINOPHILS ABSOLUTE: 0 THOU/MM3 (ref 0–0.4)
ERYTHROCYTE [DISTWIDTH] IN BLOOD BY AUTOMATED COUNT: 12.6 % (ref 11.5–14.5)
ERYTHROCYTE [DISTWIDTH] IN BLOOD BY AUTOMATED COUNT: 44.2 FL (ref 35–45)
ETHYL ALCOHOL, SERUM: < 0.01 %
GFR SERPL CREATININE-BSD FRML MDRD: > 90 ML/MIN/1.73M2
GLUCOSE BLD-MCNC: 96 MG/DL (ref 70–108)
HCT VFR BLD CALC: 41.1 % (ref 37–47)
HEMOGLOBIN: 13.5 GM/DL (ref 12–16)
IMMATURE GRANS (ABS): 0.03 THOU/MM3 (ref 0–0.07)
IMMATURE GRANULOCYTES: 0.4 %
LYMPHOCYTES # BLD: 28.8 %
LYMPHOCYTES ABSOLUTE: 2.1 THOU/MM3 (ref 1–4.8)
MCH RBC QN AUTO: 31.4 PG (ref 26–33)
MCHC RBC AUTO-ENTMCNC: 32.8 GM/DL (ref 32.2–35.5)
MCV RBC AUTO: 95.6 FL (ref 81–99)
MONOCYTES # BLD: 8 %
MONOCYTES ABSOLUTE: 0.6 THOU/MM3 (ref 0.4–1.3)
NUCLEATED RED BLOOD CELLS: 0 /100 WBC
OPIATES, URINE: NEGATIVE
OSMOLALITY CALCULATION: 271.7 MOSMOL/KG (ref 275–300)
OXYCODONE: NEGATIVE
PHENCYCLIDINE QUANTITATIVE URINE: NEGATIVE
PLATELET # BLD: 295 THOU/MM3 (ref 130–400)
PMV BLD AUTO: 11.4 FL (ref 9.4–12.4)
POTASSIUM SERPL-SCNC: 3.2 MEQ/L (ref 3.5–5.2)
PREGNANCY, SERUM: NEGATIVE
RBC # BLD: 4.3 MILL/MM3 (ref 4.2–5.4)
SALICYLATE, SERUM: < 0.3 MG/DL (ref 2–10)
SARS-COV-2, NAAT: NOT DETECTED
SEG NEUTROPHILS: 61.6 %
SEGMENTED NEUTROPHILS ABSOLUTE COUNT: 4.6 THOU/MM3 (ref 1.8–7.7)
SODIUM BLD-SCNC: 137 MEQ/L (ref 135–145)
TOTAL PROTEIN: 7.6 G/DL (ref 6.1–8)
TSH SERPL DL<=0.05 MIU/L-ACNC: 0.97 UIU/ML (ref 0.4–4.2)
WBC # BLD: 7.4 THOU/MM3 (ref 4.8–10.8)

## 2021-02-08 PROCEDURE — 80307 DRUG TEST PRSMV CHEM ANLYZR: CPT

## 2021-02-08 PROCEDURE — 80053 COMPREHEN METABOLIC PANEL: CPT

## 2021-02-08 PROCEDURE — 84443 ASSAY THYROID STIM HORMONE: CPT

## 2021-02-08 PROCEDURE — 82077 ASSAY SPEC XCP UR&BREATH IA: CPT

## 2021-02-08 PROCEDURE — 85025 COMPLETE CBC W/AUTO DIFF WBC: CPT

## 2021-02-08 PROCEDURE — U0002 COVID-19 LAB TEST NON-CDC: HCPCS

## 2021-02-08 PROCEDURE — 99285 EMERGENCY DEPT VISIT HI MDM: CPT

## 2021-02-08 PROCEDURE — 80179 DRUG ASSAY SALICYLATE: CPT

## 2021-02-08 PROCEDURE — 36415 COLL VENOUS BLD VENIPUNCTURE: CPT

## 2021-02-08 PROCEDURE — 1240000000 HC EMOTIONAL WELLNESS R&B

## 2021-02-08 PROCEDURE — 6360000002 HC RX W HCPCS: Performed by: PSYCHIATRY & NEUROLOGY

## 2021-02-08 PROCEDURE — 80143 DRUG ASSAY ACETAMINOPHEN: CPT

## 2021-02-08 PROCEDURE — 84703 CHORIONIC GONADOTROPIN ASSAY: CPT

## 2021-02-08 PROCEDURE — 2500000003 HC RX 250 WO HCPCS: Performed by: PSYCHIATRY & NEUROLOGY

## 2021-02-08 RX ORDER — TRAZODONE HYDROCHLORIDE 50 MG/1
50 TABLET ORAL NIGHTLY PRN
Status: DISCONTINUED | OUTPATIENT
Start: 2021-02-08 | End: 2021-02-22 | Stop reason: HOSPADM

## 2021-02-08 RX ORDER — MAGNESIUM HYDROXIDE/ALUMINUM HYDROXICE/SIMETHICONE 120; 1200; 1200 MG/30ML; MG/30ML; MG/30ML
30 SUSPENSION ORAL EVERY 6 HOURS PRN
Status: DISCONTINUED | OUTPATIENT
Start: 2021-02-08 | End: 2021-02-22 | Stop reason: HOSPADM

## 2021-02-08 RX ORDER — LORAZEPAM 2 MG/ML
2 INJECTION INTRAMUSCULAR EVERY 6 HOURS PRN
Status: DISCONTINUED | OUTPATIENT
Start: 2021-02-08 | End: 2021-02-22 | Stop reason: HOSPADM

## 2021-02-08 RX ORDER — LORAZEPAM 1 MG/1
2 TABLET ORAL EVERY 6 HOURS PRN
Status: DISCONTINUED | OUTPATIENT
Start: 2021-02-08 | End: 2021-02-22 | Stop reason: HOSPADM

## 2021-02-08 RX ORDER — CHLORPROMAZINE HYDROCHLORIDE 25 MG/1
50 TABLET, FILM COATED ORAL EVERY 6 HOURS PRN
Status: DISCONTINUED | OUTPATIENT
Start: 2021-02-08 | End: 2021-02-22 | Stop reason: HOSPADM

## 2021-02-08 RX ORDER — HYDROXYZINE HYDROCHLORIDE 25 MG/1
50 TABLET, FILM COATED ORAL 3 TIMES DAILY PRN
Status: DISCONTINUED | OUTPATIENT
Start: 2021-02-08 | End: 2021-02-22 | Stop reason: HOSPADM

## 2021-02-08 RX ORDER — TRAZODONE HYDROCHLORIDE 50 MG/1
50 TABLET ORAL NIGHTLY PRN
Status: DISCONTINUED | OUTPATIENT
Start: 2021-02-09 | End: 2021-02-08

## 2021-02-08 RX ORDER — ACETAMINOPHEN 325 MG/1
650 TABLET ORAL EVERY 4 HOURS PRN
Status: DISCONTINUED | OUTPATIENT
Start: 2021-02-08 | End: 2021-02-22 | Stop reason: HOSPADM

## 2021-02-08 RX ORDER — CHLORPROMAZINE HYDROCHLORIDE 25 MG/ML
50 INJECTION INTRAMUSCULAR EVERY 6 HOURS PRN
Status: DISCONTINUED | OUTPATIENT
Start: 2021-02-08 | End: 2021-02-22 | Stop reason: HOSPADM

## 2021-02-08 RX ORDER — IBUPROFEN 200 MG
400 TABLET ORAL EVERY 6 HOURS PRN
Status: DISCONTINUED | OUTPATIENT
Start: 2021-02-08 | End: 2021-02-22 | Stop reason: HOSPADM

## 2021-02-08 RX ADMIN — LORAZEPAM 2 MG: 2 INJECTION, SOLUTION INTRAMUSCULAR; INTRAVENOUS at 23:38

## 2021-02-08 RX ADMIN — CHLORPROMAZINE HYDROCHLORIDE 50 MG: 25 INJECTION INTRAMUSCULAR at 23:38

## 2021-02-08 ASSESSMENT — ENCOUNTER SYMPTOMS
NAUSEA: 0
RHINORRHEA: 0
COUGH: 0
SHORTNESS OF BREATH: 0
VOMITING: 0
ABDOMINAL PAIN: 0

## 2021-02-08 ASSESSMENT — SLEEP AND FATIGUE QUESTIONNAIRES: DO YOU HAVE DIFFICULTY SLEEPING: NO

## 2021-02-08 ASSESSMENT — PAIN SCALES - WONG BAKER: WONGBAKER_NUMERICALRESPONSE: 0

## 2021-02-08 NOTE — PROGRESS NOTES
Level A paged at this time. Spoke with patients nurse. Declines need for assessment. Patient not suicidal or homicidal. Request this clinician provide patient with information for potential group home placement. Provided patient with information for [facility]. Patients mom reports patient does have a  and appointment soon. Patients mom unsure of group home placement but will follow up. Patient continues to report she does not want to go home.
General
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
## 2021-02-08 NOTE — TELEPHONE ENCOUNTER
CT showing no evidence of small bowel obstruction. There is dilation of the colon however which is likely the cause of her symptoms.

## 2021-02-08 NOTE — TELEPHONE ENCOUNTER
Patients mother called stating that she took  Pt to ED this weekend for abdominal pain. Stated that the ED doc reccommended her to call to call and have you review the CT scan that she had done yesterday. Patients mother wondering if you have any recommendations. Stated patient is still having abdominal pain that comes and goes, Please advise.
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
## 2021-02-08 NOTE — ED PROVIDER NOTES
[facility] [address] [address] EMERGENCY DEPT  Emergency Department     Care of Elise Sierra was assumed from previous ED provider. The patient's initial evaluation and plan have been discussed with the ED prior provider who initially cared for the patient . All pertinent data has been reviewed. Time of signout is 1930. This patient is a 31 y.o. Female with abdominal pain. Discharged home. On my examination, Patient upset with family and Nursing staff. HEENT: WNL  Lungs: Clear and equal bilaterally. No increased work of breathing or respiratory distress.   Heart: Rate and rhythm regular, no murmurs clicks or gallops  Abdomen: Soft, nondistended, nontender   Lower extremities: no edema, negative Homans bilaterally  Neuro: Awake and alert, no lateralizing deficits, cranial nerves II through XII grossly intact bilaterally    EMERGENCY DEPARTMENT COURSE / MEDICAL DECISION MAKING:       MEDICATIONS GIVEN:  Orders Placed This Encounter   Medications    0.9 % sodium chloride bolus    iopamidol (ISOVUE-370) 76 % injection 80 mL    LORazepam (ATIVAN) injection 1 mg    LORazepam (ATIVAN) 2 MG/ML injection     Alicia Pino: cabinet override    LORazepam (ATIVAN) injection 1 mg     LABS / RADIOLOGY:     Results for orders placed or performed during the hospital encounter of 02/07/21   CBC Auto Differential   Result Value Ref Range    WBC 5.5 4.8 - 10.8 thou/mm3    RBC 4.53 4.20 - 5.40 mill/mm3    Hemoglobin 14.1 12.0 - 16.0 gm/dl    Hematocrit 42.9 37.0 - 47.0 %    MCV 94.7 81.0 - 99.0 fL    MCH 31.1 26.0 - 33.0 pg    MCHC 32.9 32.2 - 35.5 gm/dl    RDW-CV 12.6 11.5 - 14.5 %    RDW-SD 44.0 35.0 - 45.0 fL    Platelets 170 266 - 626 thou/mm3    MPV 11.7 9.4 - 12.4 fL    Seg Neutrophils 55.6 %    Lymphocytes 33.0 %    Monocytes 8.3 %    Eosinophils 1.8 %    Basophils 1.1 %    Immature Granulocytes 0.2 %    Segs Absolute 3.1 1.8 - 7.7 thou/mm3    Lymphocytes Absolute 1.8 1.0 - 4.8 thou/mm3    Monocytes Absolute 0.5 0.4 - 1.3 thou/mm3    Eosinophils Absolute 0.1 0.0 - 0.4 thou/mm3    Basophils Absolute 0.1 0.0 - 0.1 thou/mm3    Immature Grans (Abs) 0.01 0.00 - 0.07 thou/mm3    nRBC 0 /100 wbc   Comprehensive Metabolic Panel w/ Reflex to MG   Result Value Ref Range    Glucose 106 70 - 108 mg/dL    CREATININE 0.8 0.4 - 1.2 mg/dL    BUN 9 7 - 22 mg/dL    Sodium 140 135 - 145 meq/L    Potassium reflex Magnesium 3.7 3.5 - 5.2 meq/L    Chloride 103 98 - 111 meq/L    CO2 25 23 - 33 meq/L    Calcium 9.5 8.5 - 10.5 mg/dL    AST 20 5 - 40 U/L    Alkaline Phosphatase 69 38 - 126 U/L    Total Protein 8.0 6.1 - 8.0 g/dL    Albumin 4.6 3.5 - 5.1 g/dL    Total Bilirubin 0.6 0.3 - 1.2 mg/dL    ALT 18 11 - 66 U/L   HCG Qualitative, Serum   Result Value Ref Range    Preg, Serum NEGATIVE NEGATIVE   Anion Gap   Result Value Ref Range    Anion Gap 12.0 8.0 - 16.0 meq/L   Glomerular Filtration Rate, Estimated   Result Value Ref Range    Est, Glom Filt Rate 83 (A) ml/min/1.73m2   Osmolality   Result Value Ref Range    Osmolality Calc 278.5 275.0 - 300.0 mOsmol/kg         RECENT VITALS:     Temp: 98.7 °F (37.1 °C),  Pulse: 80, Resp: 18, BP: 123/77, SpO2: 98 %    MDM/ED Course  ED Course as of Feb 08 1241   Mon Feb 08, 2021   1236 Patient seen by me, after my colleague end of shift. The appropriate work-up was performed. Patient refused to leave the ED. Patient was upset in room. She stated \" I am bull shitting the doctors. I do not want to go home. I want to be admitted for the attention\" Given ativan to help calm patient. She was discharge in stable condition. [DD]      ED Course User Index  [DD] Maryjane Otoole DO           CLINICAL IMPRESSION      1. Generalized abdominal pain    2.  Internal hernia          DISPOSITION/PLAN   DISPOSITION Decision To Discharge 02/07/2021 08:06:49 PM      PATIENT REFERRED TO:  DO Kamilla Yip [address]  [address]  [address]  [phone]    Schedule an appointment as soon as possible for a visit in 1 day  call Dr. [facility] office to confer, perhaps set up office visit      DISCHARGE MEDICATIONS:  Discharge Medication List as of 2/7/2021  8:10 PM                 (Please note that portions of this note were completed with a voice recognition program.  Efforts were made to edit the dictations but occasionally words are mis-transcribed.)      Haresh Philip DO (electronically signed)  Attending Emergency Physician        Haresh Philip DO  02/08/21 9949
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
## 2021-02-09 PROBLEM — F39 UNSPECIFIED MOOD (AFFECTIVE) DISORDER (HCC): Status: ACTIVE | Noted: 2021-02-08

## 2021-02-09 PROCEDURE — 2500000003 HC RX 250 WO HCPCS: Performed by: PSYCHIATRY & NEUROLOGY

## 2021-02-09 PROCEDURE — 6370000000 HC RX 637 (ALT 250 FOR IP): Performed by: PHYSICIAN ASSISTANT

## 2021-02-09 PROCEDURE — 6360000002 HC RX W HCPCS: Performed by: PHYSICIAN ASSISTANT

## 2021-02-09 PROCEDURE — 90792 PSYCH DIAG EVAL W/MED SRVCS: CPT | Performed by: PSYCHIATRY & NEUROLOGY

## 2021-02-09 PROCEDURE — 1240000000 HC EMOTIONAL WELLNESS R&B

## 2021-02-09 PROCEDURE — 6370000000 HC RX 637 (ALT 250 FOR IP): Performed by: PSYCHIATRY & NEUROLOGY

## 2021-02-09 PROCEDURE — APPSS30 APP SPLIT SHARED TIME 16-30 MINUTES: Performed by: PHYSICIAN ASSISTANT

## 2021-02-09 PROCEDURE — 6360000002 HC RX W HCPCS: Performed by: PSYCHIATRY & NEUROLOGY

## 2021-02-09 RX ORDER — ALPRAZOLAM 0.5 MG/1
1 TABLET ORAL 3 TIMES DAILY
Status: DISCONTINUED | OUTPATIENT
Start: 2021-02-09 | End: 2021-02-22 | Stop reason: HOSPADM

## 2021-02-09 RX ORDER — BUDESONIDE 0.5 MG/2ML
1 INHALANT ORAL 2 TIMES DAILY
Status: DISCONTINUED | OUTPATIENT
Start: 2021-02-09 | End: 2021-02-22 | Stop reason: HOSPADM

## 2021-02-09 RX ORDER — PANTOPRAZOLE SODIUM 40 MG/1
40 TABLET, DELAYED RELEASE ORAL
Status: DISCONTINUED | OUTPATIENT
Start: 2021-02-09 | End: 2021-02-22 | Stop reason: HOSPADM

## 2021-02-09 RX ORDER — QUETIAPINE FUMARATE 400 MG/1
400 TABLET, FILM COATED ORAL DAILY
Status: DISCONTINUED | OUTPATIENT
Start: 2021-02-09 | End: 2021-02-15

## 2021-02-09 RX ORDER — CIPROFLOXACIN 250 MG/1
250 TABLET, FILM COATED ORAL 2 TIMES DAILY
Status: COMPLETED | OUTPATIENT
Start: 2021-02-09 | End: 2021-02-10

## 2021-02-09 RX ORDER — MIDODRINE HYDROCHLORIDE 5 MG/1
5 TABLET ORAL 2 TIMES DAILY WITH MEALS
Status: DISCONTINUED | OUTPATIENT
Start: 2021-02-09 | End: 2021-02-22 | Stop reason: HOSPADM

## 2021-02-09 RX ORDER — ALBUTEROL SULFATE 2.5 MG/3ML
2.5 SOLUTION RESPIRATORY (INHALATION) EVERY 6 HOURS PRN
Status: DISCONTINUED | OUTPATIENT
Start: 2021-02-09 | End: 2021-02-22 | Stop reason: HOSPADM

## 2021-02-09 RX ORDER — CIPROFLOXACIN 250 MG/1
250 TABLET, FILM COATED ORAL 2 TIMES DAILY
Status: ON HOLD | COMMUNITY
Start: 2021-02-02 | End: 2021-02-22 | Stop reason: HOSPADM

## 2021-02-09 RX ORDER — PRAVASTATIN SODIUM 20 MG
20 TABLET ORAL NIGHTLY
Status: DISCONTINUED | OUTPATIENT
Start: 2021-02-09 | End: 2021-02-22 | Stop reason: HOSPADM

## 2021-02-09 RX ORDER — POLYETHYLENE GLYCOL 3350 17 G/17G
17 POWDER, FOR SOLUTION ORAL DAILY
Status: DISCONTINUED | OUTPATIENT
Start: 2021-02-09 | End: 2021-02-18 | Stop reason: SDUPTHER

## 2021-02-09 RX ORDER — DOCUSATE SODIUM 100 MG/1
100 CAPSULE, LIQUID FILLED ORAL DAILY
Status: DISCONTINUED | OUTPATIENT
Start: 2021-02-09 | End: 2021-02-22 | Stop reason: HOSPADM

## 2021-02-09 RX ORDER — ACETAMINOPHEN 500 MG
500 TABLET ORAL EVERY 6 HOURS PRN
Status: DISCONTINUED | OUTPATIENT
Start: 2021-02-09 | End: 2021-02-22 | Stop reason: HOSPADM

## 2021-02-09 RX ADMIN — PANTOPRAZOLE SODIUM 40 MG: 40 TABLET, DELAYED RELEASE ORAL at 13:19

## 2021-02-09 RX ADMIN — ALPRAZOLAM 1 MG: 0.5 TABLET ORAL at 20:21

## 2021-02-09 RX ADMIN — SERTRALINE 150 MG: 100 TABLET, FILM COATED ORAL at 13:18

## 2021-02-09 RX ADMIN — ALPRAZOLAM 1 MG: 0.5 TABLET ORAL at 13:18

## 2021-02-09 RX ADMIN — BUDESONIDE 1000 MCG: 0.5 INHALANT RESPIRATORY (INHALATION) at 17:29

## 2021-02-09 RX ADMIN — PRAVASTATIN SODIUM 20 MG: 20 TABLET ORAL at 20:19

## 2021-02-09 RX ADMIN — CIPROFLOXACIN 250 MG: 250 TABLET, FILM COATED ORAL at 20:19

## 2021-02-09 RX ADMIN — LORAZEPAM 2 MG: 2 INJECTION, SOLUTION INTRAMUSCULAR; INTRAVENOUS at 05:38

## 2021-02-09 RX ADMIN — MIDODRINE HYDROCHLORIDE 5 MG: 5 TABLET ORAL at 13:18

## 2021-02-09 RX ADMIN — CHLORPROMAZINE HYDROCHLORIDE 50 MG: 25 INJECTION INTRAMUSCULAR at 05:39

## 2021-02-09 RX ADMIN — QUETIAPINE FUMARATE 400 MG: 400 TABLET ORAL at 20:19

## 2021-02-09 RX ADMIN — IBUPROFEN 400 MG: 200 TABLET, FILM COATED ORAL at 13:17

## 2021-02-09 RX ADMIN — ACETAMINOPHEN 650 MG: 325 TABLET ORAL at 04:42

## 2021-02-09 RX ADMIN — METOPROLOL TARTRATE 25 MG: 25 TABLET ORAL at 20:19

## 2021-02-09 RX ADMIN — TRAZODONE HYDROCHLORIDE 50 MG: 50 TABLET ORAL at 20:19

## 2021-02-09 RX ADMIN — CIPROFLOXACIN 250 MG: 250 TABLET, FILM COATED ORAL at 13:17

## 2021-02-09 RX ADMIN — METOPROLOL TARTRATE 25 MG: 25 TABLET ORAL at 13:18

## 2021-02-09 RX ADMIN — DOCUSATE SODIUM 100 MG: 100 CAPSULE, LIQUID FILLED ORAL at 13:17

## 2021-02-09 RX ADMIN — ACETAMINOPHEN 650 MG: 325 TABLET ORAL at 20:20

## 2021-02-09 RX ADMIN — ALUMINUM HYDROXIDE, MAGNESIUM HYDROXIDE, AND SIMETHICONE 30 ML: 200; 200; 20 SUSPENSION ORAL at 20:20

## 2021-02-09 ASSESSMENT — SLEEP AND FATIGUE QUESTIONNAIRES
AVERAGE NUMBER OF SLEEP HOURS: 8
DO YOU USE A SLEEP AID: NO

## 2021-02-09 ASSESSMENT — PAIN DESCRIPTION - LOCATION
LOCATION: ARM
LOCATION: BACK

## 2021-02-09 ASSESSMENT — PAIN SCALES - GENERAL
PAINLEVEL_OUTOF10: 7
PAINLEVEL_OUTOF10: 7
PAINLEVEL_OUTOF10: 0
PAINLEVEL_OUTOF10: 5

## 2021-02-09 ASSESSMENT — PAIN DESCRIPTION - ORIENTATION
ORIENTATION: LOWER
ORIENTATION: RIGHT;LEFT

## 2021-02-09 ASSESSMENT — PAIN DESCRIPTION - PROGRESSION
CLINICAL_PROGRESSION: GRADUALLY IMPROVING
CLINICAL_PROGRESSION: NOT CHANGED

## 2021-02-09 ASSESSMENT — LIFESTYLE VARIABLES: HISTORY_ALCOHOL_USE: NO

## 2021-02-09 ASSESSMENT — PATIENT HEALTH QUESTIONNAIRE - PHQ9: SUM OF ALL RESPONSES TO PHQ QUESTIONS 1-9: 3
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
## 2021-02-09 NOTE — ED NOTES
Patient sitting in bed with eyes open. Patient denies any pain or discomfort at this time. Patient did not voice further assistance will continue to monitor Patient.       Burton Patel  02/08/21 3010
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
## 2021-02-09 NOTE — ED NOTES
Patient in bed with eyes open. Unable to obtain Patient urine sample at this time. Will follow up and continue to monitor Patient.       Lewis Toribio  02/08/21 4929

## 2021-02-09 NOTE — BH NOTE
At 3398 staff and PA entered seclusion room with telepsych device so Dr. Ashkan Aldrich could talk with patient. At this time seclusion was discontinued due to patient sleeping and appearing to be calmer when interacting with staff. Patient was debriefed on unit expectations and rules and verbalized understanding of behaviors expected for her to remain out of seclusion.
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
## 2021-02-09 NOTE — PLAN OF CARE
Patient has attended part of one group so far and has also been out on the unit today so she is working toward her socialization goal for this shift. Patient will be encouraged to attend all groups on the unit daily during her hospital stay.
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
## 2021-02-09 NOTE — ED NOTES
Patient urine sample obtained and send to lab at this time. Will continue to monitor Patient.       Tamea Cough  02/08/21 2018

## 2021-02-09 NOTE — PLAN OF CARE
Goal: Teaching initiated upon admission  2/9/2021 1838 by Jeniffer Dunlap LPN  Outcome: Ongoing  Note: No evidence of learning noted at this time during shift due to cognitive delay. 2/9/2021 1800 by Jeniffer Dunlap LPN  Outcome: Ongoing     Problem: Discharge Planning:  Goal: Discharged to appropriate level of care  Description: Discharged to appropriate level of care  2/9/2021 1838 by Jeniffer Dunlap LPN  Outcome: Ongoing  Note: No discharge plans noted at this time during shift. 2/9/2021 1800 by Jeniffer Dunlap LPN  Outcome: Ongoing     Problem: General Medical Problem-Behavioral Health  Goal: STG-Medication therapy compliance  2/9/2021 1838 by Jeniffer Dunlap LPN  Outcome: Ongoing  Note: Patient compliant with medication per report from patient and her mother. 2/9/2021 1800 by Jeniffer Dunlap LPN  Outcome: Ongoing     Problem: Coping:  Goal: Ability to identify problematic behaviors that deter socialization will improve  Description: Ability to identify problematic behaviors that deter socialization will improve  2/9/2021 1838 by Jeniffer Dunlap LPN  Outcome: Ongoing  Note: Poor socialization noted with staff and peers. 2/9/2021 1800 by Jeniffer Dunlap LPN  Outcome: Ongoing     Problem: Restraint Use - Violent/Self-Destructive Behavior:  Goal: Absence of restraint indications  Description: Absence of restraint indications  2/9/2021 1838 by Jeniffer Dunlap LPN  Outcome: Completed  Note: Seclusion was discontinued at 0910 during shift. Afterwards patient remained calm, cooperative and resting in room. Out on unit for meals and needs. No additional behaviors noted. 2/9/2021 1800 by Jeniffer Dunlap LPN  Outcome: Ongoing  Goal: Absence of restraint-related injury  Description: Absence of restraint-related injury  2/9/2021 1838 by Jeniffer Dunlap LPN  Outcome: Completed  Note: No injuries noted related to restraints.   2/9/2021 1800 by Jeniffer Dunlap LPN  Outcome: Ongoing Problem: Altered Mood, Depressive Behavior:  Goal: Able to verbalize support systems  Description: Able to verbalize support systems  2/9/2021 1838 by Silvia Campuzano LPN  Outcome: Completed  Note: Patient able to verbalize support system. Patient reports her mother and grandmother are supportive. 2/9/2021 1800 by Silvia Campuzano LPN  Outcome: Ongoing    Care plan reviewed with patient. Patient does verbalize understanding of the plan of care and does not contribute to goal setting due to cognitive delay.

## 2021-02-09 NOTE — BH NOTE
Group Therapy Note    Date: 2/9/2021     Start Time:  1000  End Time:   1050    Number of Participants:   9    Type of Group: Recreational/Anxiety/Stress    Patient's Goal:   Increase knowledge of stress management. Increase socialization. Notes:  Patient came to group but did not participate with others. Patient left the group early. Status After Intervention:  Unchanged    Participation Level:  Active Listener    Participation Quality: Resistant      Speech:  mute      Thought Process/Content:  guarded      Affective Functioning: Constricted/Restricted      Mood: anxious      Level of consciousness:  Preoccupied and Inattentive      Response to Learning: Resistant      Endings: None Reported    Modes of Intervention: Education, Support, Socialization, Exploration, Problem-solving and Activity      Discipline Responsible: Psychoeducational Specialist      Signature:  Dinh Gilliam
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
## 2021-02-09 NOTE — PROGRESS NOTES
At 0530 patient came out to the nurses station and starting tearing up things off the counter. Pt started to push chairs over in the dining room. [place] police called for support. Pt continues to escalate and tell campus police \"I don't want to stop\". Pt also states \"I want my xaneys\". Advised patient that she did not get her xanax due to her receiving ativan and thorazine last evening. Pt was cooperative with medication that was given per order. Pt then started throwing her blankets out of her room. Pt continues to escalate pushing chairs in the dining room, reaching over nurses station at pulling papers off the wall and tearing them up. At [phone] pt was escorted down to the seclusion room. Advised of why she was placed in seclusion and how the seclusion can be discontinued. Pt appears to be verbalized understanding. Pt's guardian Jennifer Krishnamurthy informed of situation.
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
## 2021-02-09 NOTE — BH NOTE
Patient was not able to attend the goal group this AM due to being in the seclusion room at time of group.

## 2021-02-09 NOTE — ED NOTES
Unable to collect urine sample at this time. Will follow up, and continue to monitor patient.       Bishop Mac  02/08/21 0795

## 2021-02-09 NOTE — PROGRESS NOTES
BEHAVIORAL SERVICES: One - Hour In- Person Review  For Management of Violent or Self - Destructive Behavior    Seclusion/Restraint:  Seclusion    Reason for Intervention: Patient violent, throwing items at staff, trying to hit,kick staff    Response to Intervention:  No evidence of change    Medical Record reviewed and discussed precipitating events/behaviors with RN initiating Intervention:  Yes    Patient Medical Status:  Vital Signs:   Respiratory Status:   Circulatory Status:   Skin Integrity:    Orientation: oriented to person, place and situation    Mood/Affect: angry and anxious    Speech: Normal rate and tone    Thought Content: coherent, appears to know what she is doing    Thought Processes: Goal-Directed    Rationale for continued use of intervention: Patient continues to be a risk of acting out behavior. Was asking staff for her xanax. She stated she needs to keep her hands off staff to stay out of seclusion. Rationale for discontinuing intervention: Patient is able to: not be a danger to staff or others.    One Hour Review Evaluation Physician Notification:  yes

## 2021-02-09 NOTE — ED NOTES
ED to inpatient nurses report    Chief Complaint   Patient presents with    Psychiatric Evaluation      Present to ED from home  LOC: alert and orientated to name, place, date  Vital signs   Vitals:    02/08/21 1846 02/08/21 1851   BP:  (!) 155/96   Pulse: 123 69   Resp:  20   Temp: 97.8 °F (36.6 °C)    TempSrc: Oral Oral   SpO2:  96%   Weight:  218 lb (98.9 kg)   Height:  5' 3\" (1.6 m)      Oxygen Baseline room air    Current needs required none Bipap/Cpap No  LDAs:    Mobility: Independent  Pending ED orders: none  Present condition: stable      Electronically signed by Katy Teran RN on 2/8/2021 at 8:26 PM     Katy Teran, [address] [place] [address]  02/08/21 2027

## 2021-02-09 NOTE — ED PROVIDER NOTES
has a past surgical history that includes Gastric fundoplication; laparotomy (3/25/16); Colon surgery; Abdominal exploration surgery (05/27/2016); Eye surgery (1996); Colonoscopy; Colonoscopy (N/A, 4/25/2019); Abdomen surgery; and laparotomy (N/A, 1/21/2021). CURRENT MEDICATIONS       Previous Medications    ACETAMINOPHEN (TYLENOL) 500 MG TABLET    Take 500 mg by mouth every 6 hours as needed for Pain    ALBUTEROL (PROVENTIL) (2.5 MG/3ML) 0.083% NEBULIZER SOLUTION    inhale contents of 1 vial in nebulizer every 6 hours if needed FOR WHEEZING OR SHORTNESS OF BREATH    ALPRAZOLAM (XANAX PO)    Take 1 mg by mouth 3 times daily     BENZONATATE (TESSALON) 200 MG CAPSULE    Take 1 capsule by mouth 3 times daily as needed for Cough (Swallow whole, do not chew) Swallow whole, do not chew    BUDESONIDE (PULMICORT) 0.5 MG/2ML NEBULIZER SUSPENSION    inhale contents of 1 vial in nebulizer twice a day    DOCUSATE SODIUM (COLACE, DULCOLAX) 100 MG CAPS    Take 100 mg by mouth daily    ETONOGESTREL (NEXPLANON) 68 MG IMPLANT    68 mg by Subdermal route once    METOPROLOL TARTRATE (LOPRESSOR) 25 MG TABLET    Take 1 tablet by mouth 2 times daily    MIDODRINE (PROAMATINE) 5 MG TABLET    take 1 tablet by mouth twice a day    OMEPRAZOLE (PRILOSEC) 20 MG DELAYED RELEASE CAPSULE    Take 20 mg by mouth daily    POLYETHYLENE GLYCOL (GLYCOLAX) POWDER    Dispense 238 Gram Bottle. Use as Directed    PRAVASTATIN (PRAVACHOL) 20 MG TABLET    take 1 tablet by mouth once daily at bedtime    QUETIAPINE (SEROQUEL) 300 MG TABLET    Take 400 mg by mouth daily     SERTRALINE HCL (ZOLOFT PO)    Take 150 mg by mouth every morning        ALLERGIES     is allergic to haldol [haloperidol]. FAMILY HISTORY     She indicated that her mother is alive. She indicated that her father is alive. She indicated that the status of her neg hx is unknown.   family history includes Diabetes in her mother; High Blood Pressure in her mother.     SOCIAL HISTORY Gait: Gait normal.      Comments: No gross deficits observed   Psychiatric:         Mood and Affect: Mood normal.         Speech: Speech normal.         Behavior: Behavior normal. Behavior is cooperative. Thought Content: Thought content includes homicidal and suicidal ideation. Thought content includes homicidal and suicidal plan. Judgment: Judgment is impulsive. DIFFERENTIAL DIAGNOSIS:   Including but not limited to: Schizophrenia, suicidal ideation, homicidal ideation, bipolar disorder, mood disorder    DIAGNOSTIC RESULTS     EKG: All EKG's are interpreted by [facility] who either signs or Co-signs this chart in the absence of a cardiologist.  None    RADIOLOGY: non-plain film images(s) such as CT,Ultrasound and MRI are read by the radiologist.  Plain radiographic images are visualized and preliminarily interpreted by the emergency physician unless otherwise stated below.   No orders to display       LABS:   Labs Reviewed   COVID-19   CBC WITH AUTO DIFFERENTIAL   COMPREHENSIVE METABOLIC PANEL   ACETAMINOPHEN LEVEL   SALICYLATE LEVEL   TSH WITH REFLEX   ETHANOL   URINE DRUG SCREEN   HCG, SERUM, QUALITATIVE       EMERGENCY DEPARTMENT COURSE:   Vitals:    Vitals:    02/08/21 1846 02/08/21 1851   BP:  (!) 155/96   Pulse: 123 69   Resp:  20   Temp: 97.8 °F (36.6 °C)    TempSrc: Oral Oral   SpO2:  96%   Weight:  218 lb (98.9 kg)   Height:  5' 3\" (1.6 m)       MDM: The patient was seen in emergency room by me for mental evaluation after aggressive behavior at home. Old records were reviewed. Physical exam revealed a cooperative 18-year-old female with developmental delay. Appropriate labs were ordered. The patient was closely observed and vital signs monitored. Labs were reviewed upon completion. Labs reflected no acute abnormality. The results were discussed with the patient. The patient was thoroughly evaluated by Jenette Phalen from [facility], who consulted Dr. Re Ragland of psychiatry, who advised admission. The patient was admitted to the hospital in fair condition. All questions were addressed. CRITICAL CARE:   None    CONSULTS:  [facility])    PROCEDURES:  None    FINAL IMPRESSION      1. Homicidal ideation    2. Suicidal ideation          DISPOSITION/PLAN     1. Homicidal ideation    2.  Suicidal ideation    Admission    (Please note that portions of this note were completed with a voice recognition program.  Efforts were made to edit the dictations but occasionally words are mis-transcribed.)    Karen Gill PA-C 02/08/21 9:14 PM    CAMILLE Ward PA-C  02/08/21 0830

## 2021-02-10 PROCEDURE — APPSS30 APP SPLIT SHARED TIME 16-30 MINUTES: Performed by: PHYSICIAN ASSISTANT

## 2021-02-10 PROCEDURE — 90833 PSYTX W PT W E/M 30 MIN: CPT | Performed by: PSYCHIATRY & NEUROLOGY

## 2021-02-10 PROCEDURE — 94760 N-INVAS EAR/PLS OXIMETRY 1: CPT

## 2021-02-10 PROCEDURE — 6360000002 HC RX W HCPCS: Performed by: PHYSICIAN ASSISTANT

## 2021-02-10 PROCEDURE — 6370000000 HC RX 637 (ALT 250 FOR IP): Performed by: PSYCHIATRY & NEUROLOGY

## 2021-02-10 PROCEDURE — 1240000000 HC EMOTIONAL WELLNESS R&B

## 2021-02-10 PROCEDURE — 99232 SBSQ HOSP IP/OBS MODERATE 35: CPT | Performed by: PSYCHIATRY & NEUROLOGY

## 2021-02-10 PROCEDURE — 94640 AIRWAY INHALATION TREATMENT: CPT

## 2021-02-10 PROCEDURE — 6370000000 HC RX 637 (ALT 250 FOR IP): Performed by: PHYSICIAN ASSISTANT

## 2021-02-10 RX ADMIN — ALPRAZOLAM 1 MG: 0.5 TABLET ORAL at 08:23

## 2021-02-10 RX ADMIN — POLYETHYLENE GLYCOL 3350 17 G: 17 POWDER, FOR SOLUTION ORAL at 08:23

## 2021-02-10 RX ADMIN — SERTRALINE 150 MG: 100 TABLET, FILM COATED ORAL at 08:23

## 2021-02-10 RX ADMIN — MIDODRINE HYDROCHLORIDE 5 MG: 5 TABLET ORAL at 08:23

## 2021-02-10 RX ADMIN — PANTOPRAZOLE SODIUM 40 MG: 40 TABLET, DELAYED RELEASE ORAL at 08:23

## 2021-02-10 RX ADMIN — METOPROLOL TARTRATE 25 MG: 25 TABLET ORAL at 20:40

## 2021-02-10 RX ADMIN — ALPRAZOLAM 1 MG: 0.5 TABLET ORAL at 14:40

## 2021-02-10 RX ADMIN — CIPROFLOXACIN 250 MG: 250 TABLET, FILM COATED ORAL at 08:23

## 2021-02-10 RX ADMIN — QUETIAPINE FUMARATE 400 MG: 400 TABLET ORAL at 20:40

## 2021-02-10 RX ADMIN — DOCUSATE SODIUM 100 MG: 100 CAPSULE, LIQUID FILLED ORAL at 08:23

## 2021-02-10 RX ADMIN — ALPRAZOLAM 1 MG: 0.5 TABLET ORAL at 20:40

## 2021-02-10 RX ADMIN — PRAVASTATIN SODIUM 20 MG: 20 TABLET ORAL at 20:40

## 2021-02-10 RX ADMIN — METOPROLOL TARTRATE 25 MG: 25 TABLET ORAL at 08:23

## 2021-02-10 RX ADMIN — BUDESONIDE 1000 MCG: 0.5 INHALANT RESPIRATORY (INHALATION) at 07:45

## 2021-02-10 RX ADMIN — BUDESONIDE 1000 MCG: 0.5 INHALANT RESPIRATORY (INHALATION) at 17:50

## 2021-02-10 RX ADMIN — TRAZODONE HYDROCHLORIDE 50 MG: 50 TABLET ORAL at 20:40

## 2021-02-10 NOTE — PROGRESS NOTES
Patient did not attend groups this shift. No goal had been set and the patient was isolative to her room this shift.

## 2021-02-10 NOTE — PLAN OF CARE
Note: Patient denies suicidal thoughts this shift. 2/9/2021 1838 by Glory Chain, LPN  Outcome: Ongoing  Note: Patient reports feeling \"vilma\" suicidal. Patient has plan to \"get a knife and stab self\". Patient does contract for safety. 2/9/2021 1800 by Glory Chain, LPN  Outcome: Ongoing  Goal: Able to verbalize support systems  Description: Able to verbalize support systems  2/9/2021 1838 by Glory Chain, LPN  Outcome: Completed  Note: Patient able to verbalize support system. Patient reports her mother and grandmother are supportive. 2/9/2021 1800 by Glory Chain, LPN  Outcome: Ongoing  Goal: Absence of self-harm  Description: Absence of self-harm  2/9/2021 2159 by Jason Askew RN  Outcome: Met This Shift  Note: Patient remains free from self - harm. 2/9/2021 1838 by Glory Chain, LPN  Outcome: Ongoing  Note: Patient remains free from self-harming at this time during shift. 2/9/2021 1800 by Glory Chain, LPN  Outcome: Ongoing  Goal: Patient specific goal  Description: Patient specific goal  2/9/2021 2159 by Jason Askew RN  Outcome: Not Met This Shift  Note: Patient did not set a goal for today. 2/9/2021 1838 by Glory Chain, LPN  Outcome: Ongoing  Note: No goal set during shift. 2/9/2021 1800 by Glory Chain, LPN  Outcome: Ongoing  Goal: Participates in care planning  Description: Participates in care planning  2/9/2021 2159 by Jason Askew RN  Outcome: Not Met This Shift  Note: Care plan reviewed with patient. Patient does not verbalize understanding of the plan of care and does not contribute to goal setting . 2/9/2021 1838 by Glory Chain, LPN  Outcome: Ongoing  Note: Patient participates in care planning with staff during shift.   2/9/2021 1800 by Glory Chain, LPN  Outcome: Ongoing     Problem: Intellectual/Education/Knowledge Deficit  Goal: Teaching initiated upon admission  2/9/2021 2159 by Jason Askew RN  Outcome: Completed Note: Teaching initiated upon admission. 2/9/2021 1838 by Juan M Deluca LPN  Outcome: Ongoing  Note: No evidence of learning noted at this time during shift due to cognitive delay. 2/9/2021 1800 by Juan M Deluca LPN  Outcome: Ongoing     Problem: Discharge Planning:  Goal: Discharged to appropriate level of care  Description: Discharged to appropriate level of care  2/9/2021 2159 by Michael Valerio RN  Outcome: Ongoing  Note: Discharge planning is in process. 2/9/2021 1838 by Juan M Deluca LPN  Outcome: Ongoing  Note: No discharge plans noted at this time during shift. 2/9/2021 1800 by Juan M Deluca LPN  Outcome: Ongoing     Problem: Coping:  Goal: Ability to identify problematic behaviors that deter socialization will improve  Description: Ability to identify problematic behaviors that deter socialization will improve  2/9/2021 2159 by Michael Valerio RN  Outcome: Ongoing  2/9/2021 1838 by Juan M Deluca LPN  Outcome: Ongoing  Note: Poor socialization noted with staff and peers. 2/9/2021 1800 by Juan M Deluca LPN  Outcome: Ongoing     Problem: General Medical Problem-Behavioral Health  Goal: STG-Medication therapy compliance  2/9/2021 2159 by Michael Valerio RN  Outcome: Met This Shift  Note: Patient took her medication as ordered. 2/9/2021 1838 by Juan M Deluca LPN  Outcome: Ongoing  Note: Patient compliant with medication per report from patient and her mother. 2/9/2021 1800 by Juan M Deluca LPN  Outcome: Ongoing     Problem: GI  Goal: Other  Description: Patient will ne free from complaints of acid indigestion. Outcome: Ongoing  Note: Patient given Maalox for complaints of acid indigestion.

## 2021-02-10 NOTE — PLAN OF CARE
Problem: Anger Management/Homicidal Ideation:  Goal: Absence of angry outbursts  Description: Absence of angry outbursts  Outcome: Ongoing  Note: Free from any anger outburst so far this shift. Problem: Altered Mood, Depressive Behavior:  Goal: Able to verbalize and/or display a decrease in depressive symptoms  Description: Able to verbalize and/or display a decrease in depressive symptoms  Outcome: Ongoing  Note: Rates her mood 6 out of 10 with 10 being the best mood. Reports anxiety. Denies depression. Goal: Ability to disclose and discuss suicidal ideas will improve  Description: Ability to disclose and discuss suicidal ideas will improve  Outcome: Ongoing  Note: Denies suicidal thoughts. Goal: Absence of self-harm  Description: Absence of self-harm  Outcome: Ongoing  Note: Free from self harming behavior. Goal: Patient specific goal  Description: Patient specific goal  Outcome: Ongoing  Note: \"Being good. \"  Goal: Participates in care planning  Description: Participates in care planning  Outcome: Ongoing  Note: Care plan reviewed with patient. Patient does verbalize understanding of the plan of care and does contribute to goal setting      Problem: Discharge Planning:  Goal: Discharged to appropriate level of care  Description: Discharged to appropriate level of care  Outcome: Ongoing  Note: Discharge planning in process. Problem: General Medical Problem-Behavioral Health  Goal: STG-Medication therapy compliance  Outcome: Ongoing  Note: Patient taking medications as directed. Problem: Coping:  Goal: Ability to identify problematic behaviors that deter socialization will improve  Description: Ability to identify problematic behaviors that deter socialization will improve  Outcome: Ongoing     Problem: GI  Goal: Other  Description: Patient will ne free from complaints of acid indigestion. Outcome: Ongoing  Note: Denies any gastrointestinal problems at this time.

## 2021-02-10 NOTE — PLAN OF CARE
Patient has not been out of her room for social interaction or to attend groups today so she has not met her socialization goal for this shift. Patient did identify a daily goal for today and visited with her mother this afternoon but needs to be encouraged to attend all groups on the unit daily.

## 2021-02-10 NOTE — PROGRESS NOTES
[facility] Biopsychosocial Assessment    Current Level of Psychosocial Functioning     Independent   XXX  Dependent    Minimal Assist     Comments:      Psychosocial High Risk Factors (check all that apply)    Unable to obtain meds   Chronic illness/pain    Substance abuse   Lack of Family Support   Financial stress   Isolation   Inadequate Community Resources  Suicide attempt(s)  Not taking medications   Victim of crime   Developmental Delay XXX  Unable to manage personal needs  XXX  Age 72 or older   Homeless  No transportation   Readmission within 30 days  Unemployment  Traumatic Event    Psychiatric Advanced Directive: Mother, Tim Tomas    Family to involve in treatment: Mother    Sexual Orientation:  Heterosexual    Patient Strengths: Positive supports, community resources    Patient Barriers: Developmental delay    Opiate education provided: Not indicated    Safety plan: On-going, Q15 minute safety checks    CMHC/MH history: See clinical summary for details    Plan of Care:  medication management, group/individual therapies, family meetings, psycho -education, treatment team meetings to assist with stabilization    Initial Discharge Plan:  Patient will return home with her mother and follow up with Sheree Betts.     Clinical Summary: Patient is a 31year old female admitted to the unit from the ED voluntarily. Patient reports she is here because her mom brought her in with the ambulance. Patient reports in the ED \"they tried to give me an IV but that won't help\". Patient reports she has come to the hospital for similar reasons in the past. No confirmed psych admits to this unit in previous encounters. Patient reports she has never tried to hurt herself, but sometimes she wants to hurt herself. Patient reports she does not want to hurt others. Patient is connected with [facility] and [facility]. Patient's mother, Good Martinez, is patient's legal guardian.

## 2021-02-10 NOTE — PROGRESS NOTES
This RN has reviewed and agrees with RONI Chamberlain LPN's data collection and has collaborated with this LPN regarding the patient's care plan.

## 2021-02-10 NOTE — PATIENT CARE CONFERENCE
Delusions: Persecution  Memory:Normal: No  Memory: Poor Recent, Poor Remote  Insight and Judgment: No  Insight and Judgment: Poor Judgment, Poor Insight  Present Suicidal Ideation: No  Present Homicidal Ideation: No    Daily Assessment Last Entry:   Daily Sleep (WDL): Within Defined Limits         Patient Currently in Pain: No  Daily Nutrition (WDL): Within Defined Limits  Appetite Change: Decreased  Barriers to Nutrition: None  Level of Assistance: Independent/Self    Patient Monitoring:  Frequency of Checks: 4 times per hour, close    Psychiatric Symptoms:   Depression Symptoms  Depression Symptoms: Isolative, Impaired concentration, Feelings of hopelessess  Anxiety Symptoms  Anxiety Symptoms: Generalized  Carrol Symptoms  Carrol Symptoms: No problems reported or observed. Psychosis Symptoms  Delusion Type: No problems reported or observed. Suicide Risk CSSR-S:  1) Within the past month, have you wished you were dead or wished you could go to sleep and not wake up? : Yes  2) Have you actually had any thoughts of killing yourself? : Yes  3) Have you been thinking about how you might kill yourself? : Yes  5) Have you started to work out or worked out the details of how to kill yourself? Do you intend to carry out this plan? : No  6) Have you ever done anything, started to do anything, or prepared to do anything to end your life?: Yes  Change in Result no Change in Plan of care no      EDUCATION:   Learner Progress Toward Treatment Goals: Reviewed results and recommendations of this team, Reviewed signs, symptoms and risk of self harm and violent behavior and Reviewed goals and plan of care    Method: Individual    Outcome: Verbalized understanding and Needs reinforcement    PATIENT GOALS: \"Listen to music\"    PLAN/TREATMENT RECOMMENDATIONS UPDATE:  1. How are you progressing toward meeting your main treatment goal?   Patient established this goal today, needs time to progress. 2.  Are there discharge barriers/lingering problems that need to be addressed? None at this time       3. Do you have the ability to pay for your medications? [address]      4. How is your group participation?      Group attendance is minimal  GOALS UPDATE:   Time frame for Short-Term Goals: Daily      OSCAR Yousif

## 2021-02-10 NOTE — PROGRESS NOTES
pantoprazole (PROTONIX) tablet 40 mg, 40 mg, Oral, QAM AC  midodrine (PROAMATINE) tablet 5 mg, 5 mg, Oral, BID WC  acetaminophen (TYLENOL) tablet 500 mg, 500 mg, Oral, Q6H PRN  albuterol (PROVENTIL) nebulizer solution 2.5 mg, 2.5 mg, Nebulization, Q6H PRN  ALPRAZolam (XANAX) tablet 1 mg, 1 mg, Oral, TID  budesonide (PULMICORT) nebulizer suspension 1,000 mcg, 1 mg, Nebulization, BID  docusate sodium (COLACE) capsule 100 mg, 100 mg, Oral, Daily  metoprolol tartrate (LOPRESSOR) tablet 25 mg, 25 mg, Oral, BID  polyethylene glycol (GLYCOLAX) powder 17 g, 17 g, Oral, Daily  ciprofloxacin (CIPRO) tablet 250 mg, 250 mg, Oral, BID  acetaminophen (TYLENOL) tablet 650 mg, 650 mg, Oral, Q4H PRN  ibuprofen (ADVIL;MOTRIN) tablet 400 mg, 400 mg, Oral, Q6H PRN  magnesium hydroxide (MILK OF MAGNESIA) 400 MG/5ML suspension 30 mL, 30 mL, Oral, Daily PRN  aluminum & magnesium hydroxide-simethicone (MAALOX) 200-200-20 MG/5ML suspension 30 mL, 30 mL, Oral, Q6H PRN  hydrOXYzine (ATARAX) tablet 50 mg, 50 mg, Oral, TID PRN  LORazepam (ATIVAN) tablet 2 mg, 2 mg, Oral, Q6H PRN **OR** LORazepam (ATIVAN) injection 2 mg, 2 mg, Intramuscular, Q6H PRN  chlorproMAZINE (THORAZINE) tablet 50 mg, 50 mg, Oral, Q6H PRN **OR** chlorproMAZINE (THORAZINE) injection 50 mg, 50 mg, Intramuscular, Q6H PRN  traZODone (DESYREL) tablet 50 mg, 50 mg, Oral, Nightly PRN  influenza quadrivalent split vaccine (FLUZONE;FLUARIX;FLULAVAL;AFLURIA) injection 0.5 mL, 0.5 mL, Intramuscular, Prior to discharge     Physical     height is 5' 3\" (1.6 m) and weight is 149 lb (67.6 kg). Her oral temperature is 97.8 °F (36.6 °C). Her blood pressure is 96/61 and her pulse is 105. Her respiration is 16 and oxygen saturation is 92%.    Lab Results   Component Value Date    WBC 7.4 02/08/2021    HGB 13.5 02/08/2021    HCT 41.1 02/08/2021     02/08/2021    CHOL 162 03/27/2019    TRIG 105 03/27/2019    HDL 36 03/27/2019    ALT 18 02/08/2021    AST 22 02/08/2021  02/08/2021    K 3.2 (L) 02/08/2021     02/08/2021    CREATININE 0.7 02/08/2021    BUN 7 02/08/2021    CO2 26 02/08/2021    TSH 0.968 02/08/2021    INR 1.15 (H) 02/08/2017    LABA1C 5.6 01/21/2021          Mental Status Exam:   Level of consciousness:  alert and awake  Appearance:  well-appearing, street clothes and lying in bed, disheveled  Behavior/Motor:  calm  Attitude toward examiner:  cooperative, attentive and good eye contact  Speech:  Childlike, slow, mumbled  Mood:  \"tired\" per patient  Affect:  mood congruent  Thought processes:  concrete  Thought content:  Denies homicidal ideation  Suicidal Ideation:  denies suicidal ideation  Delusions:  no evidence of delusions  Perceptual Disturbance:  denies any perceptual disturbance  Cognition: Patient is oriented to person, place, time and situation  Concentration: clinically adequate  Memory: impaired   Insight & Judgement: impaired due to intellectual disability      ASSESSMENT     Unspecified mood (affective) disorder ([facility] [address]   Rule out bipolar disorder  Intellectual disability       PLAN    Patient's symptoms show improvement today  Continue current medication regimen  Attempt to develop insight, psycho-education and supportive therapy conducted. Probable discharge: TBD  Follow-up: [address]    Electronically signed by Elmo Bolaños PA-C on 2/10/2021 at 8:13 AM Reviewed patient's current plan of care and vital signs with nursing staff. Psychiatry Attending Attestation     I assessed this patient and reviewed the case and plan of care with Elmo Bolaños PA-C. I have reviewed the above documentation and I agree with the findings and treatment plan with the following updates. Patient was seen by Jose Doan the unit and I evaluated patient as Tele visit. Patient is no longer agitated here. Reports that she did not get good sleep last night. She has been compliant with her medications and tolerating medications well. Denies any side effect from the medications. She did not receive any emergency medications yesterday since she got out of the seclusion room. Reports feeling very tired today. Continues to have limited insight into her mental health. Rachell Wray mentioned that patient's mother is asking for as needed medications to help with her agitation. We will discuss this further with her to help possible as needed Xanax if need be. We will continue to follow with her actively when she is on inpatient. More than 16 mins of the session was spent doing Supportive psychotherapy and coordinating care. Session lasted for over 30 mins. Tony Hebert is a 31 y.o. female being evaluated by a Virtual Visit (video visit) encounter to address concerns as mentioned above. A caregiver was present in the room along with the patient. Pursuant to the emergency declaration under the [address] [place] [address], [address] [place] [address] authority and the [employer] Act, this Virtual Visit was conducted with patient's (and/or legal guardian's) consent, to reduce the patient's risk of exposure to COVID-19 and provide necessary medical care. Services were provided through a video synchronous discussion virtually to substitute for in-person visit by provider. Patient is present at [address] [place] [address] and I am physically present at my home in [facility]     --Doyle Jones MD on 2/10/2021 at 4:37 PM    An electronic signature was used to authenticate this note. **This report has been created using voice recognition software. It may contain minor errors which are inherent in voice recognition technology. **

## 2021-02-11 PROCEDURE — 6370000000 HC RX 637 (ALT 250 FOR IP): Performed by: PHYSICIAN ASSISTANT

## 2021-02-11 PROCEDURE — 6360000002 HC RX W HCPCS: Performed by: PHYSICIAN ASSISTANT

## 2021-02-11 PROCEDURE — 99232 SBSQ HOSP IP/OBS MODERATE 35: CPT | Performed by: PSYCHIATRY & NEUROLOGY

## 2021-02-11 PROCEDURE — 1240000000 HC EMOTIONAL WELLNESS R&B

## 2021-02-11 PROCEDURE — 6370000000 HC RX 637 (ALT 250 FOR IP): Performed by: PSYCHIATRY & NEUROLOGY

## 2021-02-11 RX ADMIN — POLYETHYLENE GLYCOL 3350 17 G: 17 POWDER, FOR SOLUTION ORAL at 09:14

## 2021-02-11 RX ADMIN — QUETIAPINE FUMARATE 400 MG: 400 TABLET ORAL at 20:12

## 2021-02-11 RX ADMIN — BUDESONIDE 1000 MCG: 0.5 INHALANT RESPIRATORY (INHALATION) at 18:40

## 2021-02-11 RX ADMIN — SERTRALINE 150 MG: 100 TABLET, FILM COATED ORAL at 09:12

## 2021-02-11 RX ADMIN — ALPRAZOLAM 1 MG: 0.5 TABLET ORAL at 20:12

## 2021-02-11 RX ADMIN — METOPROLOL TARTRATE 25 MG: 25 TABLET ORAL at 20:12

## 2021-02-11 RX ADMIN — ALPRAZOLAM 1 MG: 0.5 TABLET ORAL at 09:14

## 2021-02-11 RX ADMIN — PANTOPRAZOLE SODIUM 40 MG: 40 TABLET, DELAYED RELEASE ORAL at 09:14

## 2021-02-11 RX ADMIN — DOCUSATE SODIUM 100 MG: 100 CAPSULE, LIQUID FILLED ORAL at 09:14

## 2021-02-11 RX ADMIN — PRAVASTATIN SODIUM 20 MG: 20 TABLET ORAL at 20:12

## 2021-02-11 RX ADMIN — ACETAMINOPHEN 650 MG: 325 TABLET ORAL at 15:19

## 2021-02-11 RX ADMIN — IBUPROFEN 400 MG: 200 TABLET, FILM COATED ORAL at 09:14

## 2021-02-11 RX ADMIN — TRAZODONE HYDROCHLORIDE 50 MG: 50 TABLET ORAL at 20:12

## 2021-02-11 RX ADMIN — MIDODRINE HYDROCHLORIDE 5 MG: 5 TABLET ORAL at 16:57

## 2021-02-11 RX ADMIN — MIDODRINE HYDROCHLORIDE 5 MG: 5 TABLET ORAL at 09:10

## 2021-02-11 RX ADMIN — ALPRAZOLAM 1 MG: 0.5 TABLET ORAL at 15:20

## 2021-02-11 ASSESSMENT — PAIN DESCRIPTION - ORIENTATION: ORIENTATION: RIGHT

## 2021-02-11 ASSESSMENT — PAIN SCALES - GENERAL: PAINLEVEL_OUTOF10: 6

## 2021-02-11 NOTE — PROGRESS NOTES
Department of Psychiatry  Progress Note     Chief Complaint:  Unspecified mood (affective) disorder ([facility] [address]     SUBJECTIVE:    PROGRESS:  Patient feels better than before. Mood and affect are better. Patient denies any acute suicidal ideation plan or intent. Denies any homicidal thoughts, that was explored with the patient. Oriented to time place and person. Recent and remote memory is intact. Patient feels hopeful. Sleep and appetite is good. No side effect from medication reported. Side-effect of medication were discussed with the patient . Patient is responding to current treatment. Discharge soon, if patient continues to show improvement. Case discussed with the staff. He is no longer agitated here on the unit.     OBJECTIVE      Medications  Current Facility-Administered Medications: metoprolol tartrate (LOPRESSOR) tablet 25 mg, 25 mg, Oral, BID  sertraline (ZOLOFT) tablet 150 mg, 150 mg, Oral, QAM  QUEtiapine (SEROQUEL) tablet 400 mg, 400 mg, Oral, Daily  pravastatin (PRAVACHOL) tablet 20 mg, 20 mg, Oral, Nightly  pantoprazole (PROTONIX) tablet 40 mg, 40 mg, Oral, QAM AC  midodrine (PROAMATINE) tablet 5 mg, 5 mg, Oral, BID WC  acetaminophen (TYLENOL) tablet 500 mg, 500 mg, Oral, Q6H PRN  albuterol (PROVENTIL) nebulizer solution 2.5 mg, 2.5 mg, Nebulization, Q6H PRN  ALPRAZolam (XANAX) tablet 1 mg, 1 mg, Oral, TID  budesonide (PULMICORT) nebulizer suspension 1,000 mcg, 1 mg, Nebulization, BID  docusate sodium (COLACE) capsule 100 mg, 100 mg, Oral, Daily  polyethylene glycol (GLYCOLAX) powder 17 g, 17 g, Oral, Daily  acetaminophen (TYLENOL) tablet 650 mg, 650 mg, Oral, Q4H PRN  ibuprofen (ADVIL;MOTRIN) tablet 400 mg, 400 mg, Oral, Q6H PRN  magnesium hydroxide (MILK OF MAGNESIA) 400 MG/5ML suspension 30 mL, 30 mL, Oral, Daily PRN  aluminum & magnesium hydroxide-simethicone (MAALOX) 200-200-20 MG/5ML suspension 30 mL, 30 mL, Oral, Q6H PRN  hydrOXYzine (ATARAX) tablet 50 mg, 50 mg, Oral, TID PRN LORazepam (ATIVAN) tablet 2 mg, 2 mg, Oral, Q6H PRN **OR** LORazepam (ATIVAN) injection 2 mg, 2 mg, Intramuscular, Q6H PRN  chlorproMAZINE (THORAZINE) tablet 50 mg, 50 mg, Oral, Q6H PRN **OR** chlorproMAZINE (THORAZINE) injection 50 mg, 50 mg, Intramuscular, Q6H PRN  traZODone (DESYREL) tablet 50 mg, 50 mg, Oral, Nightly PRN  influenza quadrivalent split vaccine (FLUZONE;FLUARIX;FLULAVAL;AFLURIA) injection 0.5 mL, 0.5 mL, Intramuscular, Prior to discharge     Physical     height is 5' 3\" (1.6 m) and weight is 149 lb (67.6 kg). Her oral temperature is 98.5 °F (36.9 °C). Her blood pressure is 90/54 (abnormal) and her pulse is 85. Her respiration is 16 and oxygen saturation is 96%.    Lab Results   Component Value Date    WBC 7.4 02/08/2021    HGB 13.5 02/08/2021    HCT 41.1 02/08/2021     02/08/2021    CHOL 162 03/27/2019    TRIG 105 03/27/2019    HDL 36 03/27/2019    ALT 18 02/08/2021    AST 22 02/08/2021     02/08/2021    K 3.2 (L) 02/08/2021     02/08/2021    CREATININE 0.7 02/08/2021    BUN 7 02/08/2021    CO2 26 02/08/2021    TSH 0.968 02/08/2021    INR 1.15 (H) 02/08/2017    LABA1C 5.6 01/21/2021          Mental Status Exam:   Level of consciousness:  alert and awake  Appearance:  well-appearing, street clothes and lying in bed, disheveled  Behavior/Motor:  calm  Attitude toward examiner:  cooperative, attentive and good eye contact  Speech:  Childlike, slow, mumbled  Mood:  \"tired\" per patient  Affect:  mood congruent  Thought processes:  concrete  Thought content:  Denies homicidal ideation  Suicidal Ideation:  denies suicidal ideation  Delusions:  no evidence of delusions  Perceptual Disturbance:  denies any perceptual disturbance  Cognition: Patient is oriented to person, place, time and situation  Concentration: clinically adequate  Memory: impaired   Insight & Judgement: impaired due to intellectual disability      ASSESSMENT     Unspecified mood (affective) disorder ([facility] [address] Rule out bipolar disorder  Intellectual disability       PLAN  Patient's symptoms show improvement today  Continue current medication regimen  Attempt to develop insight, psycho-education and supportive therapy conducted. Probable discharge: TBD  Follow-up: [facility]      --Dung Archibald MD on 2/11/2021 at 6:02 PM    An electronic signature was used to authenticate this note. **This report has been created using voice recognition software. It may contain minor errors which are inherent in voice recognition technology. **

## 2021-02-11 NOTE — PLAN OF CARE
Problem: Anger Management/Homicidal Ideation:  Goal: Absence of angry outbursts  Description: Absence of angry outbursts  2/11/2021 0125 by Buster House LPN  Outcome: Ongoing  Note: Pt has been free from octavio outburst   2/10/2021 1242 by Christel Lobo RN  Outcome: Ongoing  Note: Free from any anger outburst so far this shift. Problem: Altered Mood, Depressive Behavior:  Goal: Able to verbalize and/or display a decrease in depressive symptoms  Description: Able to verbalize and/or display a decrease in depressive symptoms  2/11/2021 0125 by Buster House LPN  Outcome: Ongoing  Note: Pt denies depression   2/10/2021 1242 by Christel Lobo RN  Outcome: Ongoing  Note: Rates her mood 6 out of 10 with 10 being the best mood. Reports anxiety. Denies depression. Goal: Ability to disclose and discuss suicidal ideas will improve  Description: Ability to disclose and discuss suicidal ideas will improve  2/11/2021 0125 by Buster House LPN  Outcome: Ongoing  Note: Pt denies suicidal ideations   2/10/2021 1242 by Christel Lobo RN  Outcome: Ongoing  Note: Denies suicidal thoughts. Goal: Absence of self-harm  Description: Absence of self-harm  2/11/2021 0125 by Buster House LPN  Outcome: Ongoing  Note: Pt has remained free from self harm so far this shift   2/10/2021 1242 by Christel Lobo RN  Outcome: Ongoing  Note: Free from self harming behavior. Goal: Patient specific goal  Description: Patient specific goal  2/11/2021 0125 by Buster House LPN  Outcome: Not Met This Shift  Note: Pt does not have a goal set at this time   2/10/2021 1242 by Christel Lobo RN  Outcome: Ongoing  Note: \"Being good. \"  Goal: Participates in care planning  Description: Participates in care planning  2/11/2021 0125 by Buster House LPN  Outcome: Ongoing  Note: Care plan reviewed with pt.  Pt verbalized understanding   2/10/2021 1242 by Christel Lobo RN  Outcome: Ongoing

## 2021-02-11 NOTE — PLAN OF CARE
Problem: Anger Management/Homicidal Ideation:  Goal: Absence of angry outbursts  Description: Absence of angry outbursts  2/11/2021 1435 by Vinnie Purcell LPN  Outcome: Ongoing  Note: Patient remained free from angry outbursts at this time during shift. 2/11/2021 0125 by Brant Agrawal LPN  Outcome: Ongoing  Note: Pt has been free from octavio outburst      Problem: Altered Mood, Depressive Behavior:  Goal: Able to verbalize and/or display a decrease in depressive symptoms  Description: Able to verbalize and/or display a decrease in depressive symptoms  2/11/2021 1435 by Vinnie Purcell LPN  Outcome: Ongoing  Note: Patient reports no depression during shift assessment. Patient appears to depressed and reports that she has no motivation to do anything and is very sleepy. 2/11/2021 0125 by Brant Agrawal LPN  Outcome: Ongoing  Note: Pt denies depression   Goal: Ability to disclose and discuss suicidal ideas will improve  Description: Ability to disclose and discuss suicidal ideas will improve  2/11/2021 1435 by Vinnie Purcell LPN  Outcome: Ongoing  Note: Patient denies suicidal ideations during shift assessment. 2/11/2021 0125 by Brant Agrawal LPN  Outcome: Ongoing  Note: Pt denies suicidal ideations   Goal: Absence of self-harm  Description: Absence of self-harm  2/11/2021 1435 by Vinnie Purcell LPN  Outcome: Ongoing  Note: Patient remains free from self-harming behavior at this time during shift. 2/11/2021 0125 by Brant Agrawal LPN  Outcome: Ongoing  Note: Pt has remained free from self harm so far this shift   Goal: Patient specific goal  Description: Patient specific goal  2/11/2021 1435 by Vinnie Purcell LPN  Outcome: Ongoing  Note: Daily goal is to \"get some rest\".   2/11/2021 0125 by Brant Agrawal LPN  Outcome: Not Met This Shift  Note: Pt does not have a goal set at this time   Goal: Participates in care planning  Description: Participates in care planning 2/11/2021 1435 by Nahid Walker LPN  Outcome: Ongoing  Note: Patient participates in care planning with staff during shift. 2/11/2021 0125 by Corie Donnelly LPN  Outcome: Ongoing  Note: Care plan reviewed with pt. Pt verbalized understanding      Problem: Discharge Planning:  Goal: Discharged to appropriate level of care  Description: Discharged to appropriate level of care  2/11/2021 1435 by Nahid Walker LPN  Outcome: Ongoing  Note: No discharge plans noted at this time during shift. 2/11/2021 0125 by Corie Donnelly LPN  Outcome: Ongoing  Note: Discharge planner working with pt to achieve optimal discharge plan specific to the needs of the pt. Pt plans to follow up with Aggie Yanez      Problem: General Medical Problem-Behavioral Health  Goal: STG-Medication therapy compliance  2/11/2021 1435 by Nahid Walker LPN  Outcome: Ongoing  Note: Patient compliant with medication at this time during shift. 2/11/2021 0125 by Corie Donnelly LPN  Outcome: Met This Shift     Problem: Coping:  Goal: Ability to identify problematic behaviors that deter socialization will improve  Description: Ability to identify problematic behaviors that deter socialization will improve  2/11/2021 1435 by Nahid Walker LPN  Outcome: Ongoing  Note: Patient encouraged to socialize with peers while out on unit. 2/11/2021 0125 by Corie Donnelly LPN  Outcome: Not Met This Shift  Note: Pt is isolate to her room      Problem: GI  Goal: Other  Description: Patient will ne free from complaints of acid indigestion. 2/11/2021 1435 by Nahid Walker LPN  Outcome: Ongoing  Note: No GI problems reported. 2/11/2021 0125 by Corie Donnelly LPN  Outcome: Ongoing    Care plan reviewed with patient.   Patient does verbalize understanding of the plan of care and does contribute to goal setting

## 2021-02-11 NOTE — BH NOTE
This RN has reviewed and agrees with Indio Rae LPN's data collection and has collaborated with this LPN regarding the patient's care plan.

## 2021-02-12 PROCEDURE — 6370000000 HC RX 637 (ALT 250 FOR IP): Performed by: PHYSICIAN ASSISTANT

## 2021-02-12 PROCEDURE — 6370000000 HC RX 637 (ALT 250 FOR IP): Performed by: PSYCHIATRY & NEUROLOGY

## 2021-02-12 PROCEDURE — 99232 SBSQ HOSP IP/OBS MODERATE 35: CPT | Performed by: PSYCHIATRY & NEUROLOGY

## 2021-02-12 PROCEDURE — APPSS30 APP SPLIT SHARED TIME 16-30 MINUTES: Performed by: PHYSICIAN ASSISTANT

## 2021-02-12 PROCEDURE — 6360000002 HC RX W HCPCS: Performed by: PHYSICIAN ASSISTANT

## 2021-02-12 PROCEDURE — 1240000000 HC EMOTIONAL WELLNESS R&B

## 2021-02-12 PROCEDURE — 94640 AIRWAY INHALATION TREATMENT: CPT

## 2021-02-12 RX ORDER — QUETIAPINE FUMARATE 25 MG/1
50 TABLET, FILM COATED ORAL NIGHTLY
Status: DISCONTINUED | OUTPATIENT
Start: 2021-02-12 | End: 2021-02-15

## 2021-02-12 RX ADMIN — SERTRALINE 150 MG: 100 TABLET, FILM COATED ORAL at 08:07

## 2021-02-12 RX ADMIN — BUDESONIDE 1000 MCG: 0.5 INHALANT RESPIRATORY (INHALATION) at 18:24

## 2021-02-12 RX ADMIN — ALPRAZOLAM 1 MG: 0.5 TABLET ORAL at 14:12

## 2021-02-12 RX ADMIN — ALPRAZOLAM 1 MG: 0.5 TABLET ORAL at 21:42

## 2021-02-12 RX ADMIN — PANTOPRAZOLE SODIUM 40 MG: 40 TABLET, DELAYED RELEASE ORAL at 08:08

## 2021-02-12 RX ADMIN — BUDESONIDE 1000 MCG: 0.5 INHALANT RESPIRATORY (INHALATION) at 08:22

## 2021-02-12 RX ADMIN — CHLORPROMAZINE HYDROCHLORIDE 50 MG: 25 TABLET, SUGAR COATED ORAL at 21:41

## 2021-02-12 RX ADMIN — METOPROLOL TARTRATE 25 MG: 25 TABLET ORAL at 21:39

## 2021-02-12 RX ADMIN — METOPROLOL TARTRATE 25 MG: 25 TABLET ORAL at 08:09

## 2021-02-12 RX ADMIN — MIDODRINE HYDROCHLORIDE 5 MG: 5 TABLET ORAL at 08:07

## 2021-02-12 RX ADMIN — IBUPROFEN 400 MG: 200 TABLET, FILM COATED ORAL at 08:07

## 2021-02-12 RX ADMIN — PRAVASTATIN SODIUM 20 MG: 20 TABLET ORAL at 21:39

## 2021-02-12 RX ADMIN — TRAZODONE HYDROCHLORIDE 50 MG: 50 TABLET ORAL at 21:42

## 2021-02-12 RX ADMIN — QUETIAPINE FUMARATE 50 MG: 25 TABLET ORAL at 21:39

## 2021-02-12 RX ADMIN — DOCUSATE SODIUM 100 MG: 100 CAPSULE, LIQUID FILLED ORAL at 08:08

## 2021-02-12 RX ADMIN — QUETIAPINE FUMARATE 400 MG: 400 TABLET ORAL at 21:41

## 2021-02-12 RX ADMIN — MIDODRINE HYDROCHLORIDE 5 MG: 5 TABLET ORAL at 17:09

## 2021-02-12 RX ADMIN — ALPRAZOLAM 1 MG: 0.5 TABLET ORAL at 08:07

## 2021-02-12 RX ADMIN — POLYETHYLENE GLYCOL 3350 17 G: 17 POWDER, FOR SOLUTION ORAL at 08:08

## 2021-02-12 ASSESSMENT — PAIN SCALES - GENERAL
PAINLEVEL_OUTOF10: 0
PAINLEVEL_OUTOF10: 8

## 2021-02-12 NOTE — PLAN OF CARE
Patient has not attended any of the groups and has been isolating in her room all day so she has not met her socialization goal for today. Patient will be encouraged to attend all groups on the unit daily and to come out of her room to socialize with others during the rest of her hospital stay.

## 2021-02-12 NOTE — PROGRESS NOTES
Department of Psychiatry  Progress Note     Chief Complaint:  Unspecified mood (affective) disorder ([facility] [address]     SUBJECTIVE:    PROGRESS:  Marsha Singh is somnolent on examination. She is seen resting in her bed. Marsha Singh reports she is doing good today but is tired  She endorses feeling sad because she misses her mom. She denies any thoughts of harm to herself or others today  She has not been getting angry on the unit. Staff reports last night after her mother left after visitation, Marsha Singh was tearing up paper. She knocked over 2 heavy chairs and threw a bottle at staff. She was eventually able to be redirected back to her room and calmed down without the use of PRN medications. She has been sleeping in her room today. She has been behaviorally in control. She reports she slept good last night. Staff reported she slept 8 hours. She has been eating well on the unit. She ate her breakfast in her room this morning. She has not been attending groups due to resting in her room. Marsha Singh has been compliant with and tolerating her medications well per staff. Her mother Dayna López has been visiting her on the unit. Suicidal ideations: denies    Compliance with medications: good   Medication side effects: absent  ROS: Patient has new complaints:  no  Sleep quality: 8 hours last night per staff.    Attending groups: no      OBJECTIVE      Medications  Current Facility-Administered Medications: metoprolol tartrate (LOPRESSOR) tablet 25 mg, 25 mg, Oral, BID  sertraline (ZOLOFT) tablet 150 mg, 150 mg, Oral, QAM  QUEtiapine (SEROQUEL) tablet 400 mg, 400 mg, Oral, Daily  pravastatin (PRAVACHOL) tablet 20 mg, 20 mg, Oral, Nightly  pantoprazole (PROTONIX) tablet 40 mg, 40 mg, Oral, QAM AC  midodrine (PROAMATINE) tablet 5 mg, 5 mg, Oral, BID WC  acetaminophen (TYLENOL) tablet 500 mg, 500 mg, Oral, Q6H PRN  albuterol (PROVENTIL) nebulizer solution 2.5 mg, 2.5 mg, Nebulization, Q6H PRN ALPRAZolam (XANAX) tablet 1 mg, 1 mg, Oral, TID  budesonide (PULMICORT) nebulizer suspension 1,000 mcg, 1 mg, Nebulization, BID  docusate sodium (COLACE) capsule 100 mg, 100 mg, Oral, Daily  polyethylene glycol (GLYCOLAX) powder 17 g, 17 g, Oral, Daily  acetaminophen (TYLENOL) tablet 650 mg, 650 mg, Oral, Q4H PRN  ibuprofen (ADVIL;MOTRIN) tablet 400 mg, 400 mg, Oral, Q6H PRN  magnesium hydroxide (MILK OF MAGNESIA) 400 MG/5ML suspension 30 mL, 30 mL, Oral, Daily PRN  aluminum & magnesium hydroxide-simethicone (MAALOX) 200-200-20 MG/5ML suspension 30 mL, 30 mL, Oral, Q6H PRN  hydrOXYzine (ATARAX) tablet 50 mg, 50 mg, Oral, TID PRN  LORazepam (ATIVAN) tablet 2 mg, 2 mg, Oral, Q6H PRN **OR** LORazepam (ATIVAN) injection 2 mg, 2 mg, Intramuscular, Q6H PRN  chlorproMAZINE (THORAZINE) tablet 50 mg, 50 mg, Oral, Q6H PRN **OR** chlorproMAZINE (THORAZINE) injection 50 mg, 50 mg, Intramuscular, Q6H PRN  traZODone (DESYREL) tablet 50 mg, 50 mg, Oral, Nightly PRN  influenza quadrivalent split vaccine (FLUZONE;FLUARIX;FLULAVAL;AFLURIA) injection 0.5 mL, 0.5 mL, Intramuscular, Prior to discharge     Physical     height is 5' 3\" (1.6 m) and weight is 149 lb (67.6 kg). Her tympanic temperature is 98.3 °F (36.8 °C). Her blood pressure is 140/80 (abnormal) and her pulse is 84. Her respiration is 18 and oxygen saturation is 97%.    Lab Results   Component Value Date    WBC 7.4 02/08/2021    HGB 13.5 02/08/2021    HCT 41.1 02/08/2021     02/08/2021    CHOL 162 03/27/2019    TRIG 105 03/27/2019    HDL 36 03/27/2019    ALT 18 02/08/2021    AST 22 02/08/2021     02/08/2021    K 3.2 (L) 02/08/2021     02/08/2021    CREATININE 0.7 02/08/2021    BUN 7 02/08/2021    CO2 26 02/08/2021    TSH 0.968 02/08/2021    INR 1.15 (H) 02/08/2017    LABA1C 5.6 01/21/2021          Mental Status Exam:   Level of consciousness:  somnolent  Appearance:  well-appearing, street clothes and lying in bed  Behavior/Motor:  calm
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
## 2021-02-12 NOTE — BH NOTE
Group Therapy Note    Date: 2/12/2021  Start Time: 11:00  End Time:  11:30  Number of Participants: 7    Type of Group: Psychoeducation / Healthy Living      Group's Goal:  Discussed worksheet called: \"How Well are you prepared to stay Well\". Notes:  Discussed self care, support system, relapse prevention, and mindfulness. Topics included coping skills, how to relax, your support system, eating healthy, avoid drugs and alcohol, practicing safe sex, and negative effects of smoking cigarettes.      Status After Intervention:  N/a    Participation Level: Patient refused to attend this group    Participation Quality: n/a      Speech:  n/a      Thought Process/Content: n/a      Affective Functioning: n/a      Mood: n/a      Level of consciousness:  n/a      Response to Learning: n/a    Endings: N/A    Modes of Intervention: Education, Support and Socialization      Discipline Responsible: Registered Nurse      Signature:  NED Fabian RN MSN

## 2021-02-13 PROCEDURE — 1240000000 HC EMOTIONAL WELLNESS R&B

## 2021-02-13 PROCEDURE — 99231 SBSQ HOSP IP/OBS SF/LOW 25: CPT | Performed by: NURSE PRACTITIONER

## 2021-02-13 PROCEDURE — 6360000002 HC RX W HCPCS: Performed by: PHYSICIAN ASSISTANT

## 2021-02-13 PROCEDURE — 94760 N-INVAS EAR/PLS OXIMETRY 1: CPT

## 2021-02-13 PROCEDURE — 94640 AIRWAY INHALATION TREATMENT: CPT

## 2021-02-13 PROCEDURE — 6370000000 HC RX 637 (ALT 250 FOR IP): Performed by: PHYSICIAN ASSISTANT

## 2021-02-13 PROCEDURE — 6370000000 HC RX 637 (ALT 250 FOR IP): Performed by: PSYCHIATRY & NEUROLOGY

## 2021-02-13 RX ORDER — CHLORPROMAZINE HYDROCHLORIDE 25 MG/1
50 TABLET, FILM COATED ORAL ONCE
Status: DISCONTINUED | OUTPATIENT
Start: 2021-02-13 | End: 2021-02-22 | Stop reason: HOSPADM

## 2021-02-13 RX ADMIN — PANTOPRAZOLE SODIUM 40 MG: 40 TABLET, DELAYED RELEASE ORAL at 08:47

## 2021-02-13 RX ADMIN — ALPRAZOLAM 1 MG: 0.5 TABLET ORAL at 21:26

## 2021-02-13 RX ADMIN — PRAVASTATIN SODIUM 20 MG: 20 TABLET ORAL at 21:25

## 2021-02-13 RX ADMIN — QUETIAPINE FUMARATE 50 MG: 25 TABLET ORAL at 21:25

## 2021-02-13 RX ADMIN — ALPRAZOLAM 1 MG: 0.5 TABLET ORAL at 08:47

## 2021-02-13 RX ADMIN — CHLORPROMAZINE HYDROCHLORIDE 50 MG: 25 TABLET, SUGAR COATED ORAL at 21:18

## 2021-02-13 RX ADMIN — SERTRALINE 150 MG: 100 TABLET, FILM COATED ORAL at 08:47

## 2021-02-13 RX ADMIN — BUDESONIDE 1000 MCG: 0.5 INHALANT RESPIRATORY (INHALATION) at 07:36

## 2021-02-13 RX ADMIN — ALPRAZOLAM 1 MG: 0.5 TABLET ORAL at 17:32

## 2021-02-13 RX ADMIN — METOPROLOL TARTRATE 25 MG: 25 TABLET ORAL at 21:18

## 2021-02-13 RX ADMIN — MIDODRINE HYDROCHLORIDE 5 MG: 5 TABLET ORAL at 08:47

## 2021-02-13 RX ADMIN — HYDROXYZINE HYDROCHLORIDE 50 MG: 25 TABLET ORAL at 08:47

## 2021-02-13 RX ADMIN — MIDODRINE HYDROCHLORIDE 5 MG: 5 TABLET ORAL at 18:52

## 2021-02-13 RX ADMIN — QUETIAPINE FUMARATE 400 MG: 400 TABLET ORAL at 21:26

## 2021-02-13 RX ADMIN — BUDESONIDE 1000 MCG: 0.5 INHALANT RESPIRATORY (INHALATION) at 21:59

## 2021-02-13 ASSESSMENT — PAIN SCALES - GENERAL
PAINLEVEL_OUTOF10: 0

## 2021-02-13 ASSESSMENT — PAIN SCALES - WONG BAKER: WONGBAKER_NUMERICALRESPONSE: 0

## 2021-02-13 NOTE — PLAN OF CARE
Problem: Role Relationship:  Goal: Ability to demonstrate positive changes in social behaviors and relationships will improve  Description: Ability to demonstrate positive changes in social behaviors and relationships will improve  Outcome: Ongoing  Note: Pt has not attended any of the groups that have been offered on the unit so far this shift. Pt has been in her room most of the shift and has not been seen interacting with peers. Pt will be encouraged to attend the groups that are offered this shift and to come out and interact with peers. Pt progress towards socialization goal is ongoing.

## 2021-02-13 NOTE — PLAN OF CARE
Problem: Anger Management/Homicidal Ideation:  Goal: Absence of violence  Outcome: Met This Shift  Note: Patient has no violent outbursts this shift. Problem: Altered Mood, Depressive Behavior:  Goal: No signs of physiological stress  Description: No signs of physiological stress  Outcome: Met This Shift  Note: No signs of physiological stress noted. Goal: STG-Able to verbalize suicidal ideations  Outcome: Met This Shift  Note: Patient denies having suicidal ideations this shift. Problem: Intellectual/Education/Knowledge Deficit  Goal: Highest potential functional level  Outcome: Met This Shift  Note: Patient meets highest functional level this shift. Problem: Discharge Planning:  Goal: Knowledge of discharge plan  Outcome: Not Met This Shift  Note: Patient not discharged this shift. Patient continues to work toward discharge goal with care team.      Problem: Role Relationship:  Goal: Ability to demonstrate positive changes in social behaviors and relationships will improve  Description: Ability to demonstrate positive changes in social behaviors and relationships will improve  2/13/2021 1830 by Maribell Castro RN  Outcome: Ongoing  Note: Patient isolates to room for much of shift today. 2/13/2021 1410 by Allen Franco  Outcome: Ongoing  Note: Pt has not attended any of the groups that have been offered on the unit so far this shift. Pt has been in her room most of the shift and has not been seen interacting with peers. Pt will be encouraged to attend the groups that are offered this shift and to come out and interact with peers. Pt progress towards socialization goal is ongoing. Problem: Pain:  Goal: Able to cope with pain  Description: Able to cope with pain  Outcome: Met This Shift  Note: Patient allan having pain this shift.      Problem: Anxiety:  Goal: Level of anxiety will decrease  Description: Level of anxiety will decrease  Outcome: Ongoing Note: Patient continues to report feelings of anxiety this shift. Problem: General Medical Problem-Behavioral Health  Goal: Compliance with prescribed medication regimen will improve  Description: Compliance with prescribed medication regimen will improve  Outcome: Met This Shift  Note: Patient compliant with all medications this shift as ordered. Problem: Activity:  Goal: Sleeping patterns will improve  Description: Sleeping patterns will improve  Outcome: Ongoing  Note: Patient sleeps excessively this shift until approximately this mid-afternoon. Problem: General Medical Problem-Behavioral Health  Goal: Compliance with prescribed medication regimen will improve  Description: Compliance with prescribed medication regimen will improve  Outcome: Met This Shift  Note: Patient compliant with all medications this shift as ordered.

## 2021-02-13 NOTE — PLAN OF CARE
Problem: Anger Management/Homicidal Ideation:  Goal: Absence of violence  Outcome: Ongoing  Note: No violent behaviors this shift, patient cooperative. Problem: Altered Mood, Depressive Behavior:  Goal: No signs of physiological stress  Description: No signs of physiological stress  Outcome: Ongoing  Note: Patient denies any depression or suicidal thoughts. Problem: Intellectual/Education/Knowledge Deficit  Goal: Highest potential functional level  Outcome: Ongoing  Note: Patient needs assistance with education and reinforcement. Problem: Discharge Planning:  Goal: Knowledge of discharge plan  Outcome: Ongoing  Note: Discharge planning is in progress. Problem: General Medical Problem-Behavioral Health  Goal: Compliance with prescribed medication regimen will improve  Description: Compliance with prescribed medication regimen will improve  Outcome: Ongoing  Note: Patient compliant with prescribed medication. Problem: Pain:  Goal: Able to cope with pain  Description: Able to cope with pain  Outcome: Ongoing  Note: Patient currently denies pain. Care plan reviewed with patient.   Patient does not verbalize understanding of the plan of care and does not contribute to goal setting

## 2021-02-14 PROCEDURE — 99231 SBSQ HOSP IP/OBS SF/LOW 25: CPT | Performed by: NURSE PRACTITIONER

## 2021-02-14 PROCEDURE — 6360000002 HC RX W HCPCS: Performed by: PHYSICIAN ASSISTANT

## 2021-02-14 PROCEDURE — 6370000000 HC RX 637 (ALT 250 FOR IP): Performed by: PSYCHIATRY & NEUROLOGY

## 2021-02-14 PROCEDURE — 94640 AIRWAY INHALATION TREATMENT: CPT

## 2021-02-14 PROCEDURE — 6370000000 HC RX 637 (ALT 250 FOR IP): Performed by: PHYSICIAN ASSISTANT

## 2021-02-14 PROCEDURE — 1240000000 HC EMOTIONAL WELLNESS R&B

## 2021-02-14 RX ADMIN — METOPROLOL TARTRATE 25 MG: 25 TABLET ORAL at 21:07

## 2021-02-14 RX ADMIN — MIDODRINE HYDROCHLORIDE 5 MG: 5 TABLET ORAL at 10:48

## 2021-02-14 RX ADMIN — SERTRALINE 150 MG: 100 TABLET, FILM COATED ORAL at 10:50

## 2021-02-14 RX ADMIN — MIDODRINE HYDROCHLORIDE 5 MG: 5 TABLET ORAL at 17:17

## 2021-02-14 RX ADMIN — LORAZEPAM 2 MG: 1 TABLET ORAL at 18:31

## 2021-02-14 RX ADMIN — ALPRAZOLAM 1 MG: 0.5 TABLET ORAL at 14:08

## 2021-02-14 RX ADMIN — PANTOPRAZOLE SODIUM 40 MG: 40 TABLET, DELAYED RELEASE ORAL at 10:48

## 2021-02-14 RX ADMIN — TRAZODONE HYDROCHLORIDE 50 MG: 50 TABLET ORAL at 21:09

## 2021-02-14 RX ADMIN — ALBUTEROL SULFATE 2.5 MG: 2.5 SOLUTION RESPIRATORY (INHALATION) at 10:21

## 2021-02-14 RX ADMIN — QUETIAPINE FUMARATE 50 MG: 25 TABLET ORAL at 21:08

## 2021-02-14 RX ADMIN — QUETIAPINE FUMARATE 400 MG: 400 TABLET ORAL at 21:07

## 2021-02-14 RX ADMIN — ALPRAZOLAM 1 MG: 0.5 TABLET ORAL at 10:47

## 2021-02-14 RX ADMIN — PRAVASTATIN SODIUM 20 MG: 20 TABLET ORAL at 21:07

## 2021-02-14 RX ADMIN — ACETAMINOPHEN 500 MG: 500 TABLET ORAL at 21:09

## 2021-02-14 RX ADMIN — BUDESONIDE 1000 MCG: 0.5 INHALANT RESPIRATORY (INHALATION) at 10:23

## 2021-02-14 RX ADMIN — ALPRAZOLAM 1 MG: 0.5 TABLET ORAL at 21:09

## 2021-02-14 RX ADMIN — HYDROXYZINE HYDROCHLORIDE 50 MG: 25 TABLET ORAL at 10:48

## 2021-02-14 RX ADMIN — HYDROXYZINE HYDROCHLORIDE 50 MG: 25 TABLET ORAL at 15:07

## 2021-02-14 ASSESSMENT — PAIN DESCRIPTION - ORIENTATION: ORIENTATION: ANTERIOR

## 2021-02-14 ASSESSMENT — PAIN DESCRIPTION - ONSET: ONSET: SUDDEN

## 2021-02-14 ASSESSMENT — PAIN DESCRIPTION - PROGRESSION: CLINICAL_PROGRESSION: NOT CHANGED

## 2021-02-14 ASSESSMENT — PAIN SCALES - GENERAL
PAINLEVEL_OUTOF10: 0
PAINLEVEL_OUTOF10: 0

## 2021-02-14 ASSESSMENT — PAIN DESCRIPTION - FREQUENCY
FREQUENCY: INTERMITTENT
FREQUENCY: INTERMITTENT

## 2021-02-14 ASSESSMENT — PAIN DESCRIPTION - PAIN TYPE: TYPE: ACUTE PAIN

## 2021-02-14 ASSESSMENT — PAIN DESCRIPTION - DESCRIPTORS: DESCRIPTORS: HEADACHE
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
## 2021-02-14 NOTE — PROGRESS NOTES
Spoke with Patient's mother on phone. Patient's mother becomes angry, verbally aggressive with writer about patient being allowed to receive food from outside the hospital. Writer educates patient's mother that food intake rules have changed recently. Patient's mother refuses to verbalize understanding. States. \"I don't think that's right. I don't think you know what you're talking about. \" Will continue to monitor.
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
## 2021-02-14 NOTE — PLAN OF CARE
Problem: Role Relationship:  Goal: Ability to demonstrate positive changes in social behaviors and relationships will improve  Description: Ability to demonstrate positive changes in social behaviors and relationships will improve  Outcome: Ongoing  Note: Pt has not attended any of the groups that are offered on the unit so far this shift. Pt has been in her room and has not been interacting with peers. Pt will be encouraged to attend the groups offered on the unit and to come out of her room and interact with peers. Pt progress towards socialization goal is ongoing.
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
## 2021-02-15 PROCEDURE — 6370000000 HC RX 637 (ALT 250 FOR IP): Performed by: PHYSICIAN ASSISTANT

## 2021-02-15 PROCEDURE — 94640 AIRWAY INHALATION TREATMENT: CPT

## 2021-02-15 PROCEDURE — 99232 SBSQ HOSP IP/OBS MODERATE 35: CPT | Performed by: PHYSICIAN ASSISTANT

## 2021-02-15 PROCEDURE — 6370000000 HC RX 637 (ALT 250 FOR IP): Performed by: PSYCHIATRY & NEUROLOGY

## 2021-02-15 PROCEDURE — 6360000002 HC RX W HCPCS: Performed by: PHYSICIAN ASSISTANT

## 2021-02-15 PROCEDURE — 6360000002 HC RX W HCPCS: Performed by: PSYCHIATRY & NEUROLOGY

## 2021-02-15 PROCEDURE — 2500000003 HC RX 250 WO HCPCS: Performed by: PSYCHIATRY & NEUROLOGY

## 2021-02-15 PROCEDURE — 1240000000 HC EMOTIONAL WELLNESS R&B

## 2021-02-15 RX ORDER — QUETIAPINE FUMARATE 100 MG/1
100 TABLET, FILM COATED ORAL DAILY
Status: DISCONTINUED | OUTPATIENT
Start: 2021-02-16 | End: 2021-02-22 | Stop reason: HOSPADM

## 2021-02-15 RX ORDER — QUETIAPINE FUMARATE 400 MG/1
400 TABLET, FILM COATED ORAL NIGHTLY
Status: DISCONTINUED | OUTPATIENT
Start: 2021-02-16 | End: 2021-02-22 | Stop reason: HOSPADM

## 2021-02-15 RX ORDER — QUETIAPINE FUMARATE 100 MG/1
100 TABLET, FILM COATED ORAL NIGHTLY
Status: COMPLETED | OUTPATIENT
Start: 2021-02-15 | End: 2021-02-15

## 2021-02-15 RX ORDER — QUETIAPINE FUMARATE 100 MG/1
100 TABLET, FILM COATED ORAL NIGHTLY
Status: DISCONTINUED | OUTPATIENT
Start: 2021-02-15 | End: 2021-02-15

## 2021-02-15 RX ADMIN — POLYETHYLENE GLYCOL 3350 17 G: 17 POWDER, FOR SOLUTION ORAL at 09:28

## 2021-02-15 RX ADMIN — PRAVASTATIN SODIUM 20 MG: 20 TABLET ORAL at 22:56

## 2021-02-15 RX ADMIN — LORAZEPAM 2 MG: 2 INJECTION, SOLUTION INTRAMUSCULAR; INTRAVENOUS at 20:15

## 2021-02-15 RX ADMIN — PANTOPRAZOLE SODIUM 40 MG: 40 TABLET, DELAYED RELEASE ORAL at 08:16

## 2021-02-15 RX ADMIN — DOCUSATE SODIUM 100 MG: 100 CAPSULE, LIQUID FILLED ORAL at 08:17

## 2021-02-15 RX ADMIN — CHLORPROMAZINE HYDROCHLORIDE 50 MG: 25 INJECTION INTRAMUSCULAR at 20:52

## 2021-02-15 RX ADMIN — BUDESONIDE 1000 MCG: 0.5 INHALANT RESPIRATORY (INHALATION) at 10:39

## 2021-02-15 RX ADMIN — IBUPROFEN 400 MG: 200 TABLET, FILM COATED ORAL at 18:11

## 2021-02-15 RX ADMIN — SERTRALINE 150 MG: 100 TABLET, FILM COATED ORAL at 08:16

## 2021-02-15 RX ADMIN — QUETIAPINE FUMARATE 100 MG: 100 TABLET ORAL at 22:56

## 2021-02-15 RX ADMIN — METOPROLOL TARTRATE 25 MG: 25 TABLET ORAL at 22:57

## 2021-02-15 RX ADMIN — MIDODRINE HYDROCHLORIDE 5 MG: 5 TABLET ORAL at 16:51

## 2021-02-15 RX ADMIN — ALPRAZOLAM 1 MG: 0.5 TABLET ORAL at 08:17

## 2021-02-15 RX ADMIN — ALPRAZOLAM 1 MG: 0.5 TABLET ORAL at 14:25

## 2021-02-15 RX ADMIN — TRAZODONE HYDROCHLORIDE 50 MG: 50 TABLET ORAL at 22:57

## 2021-02-15 RX ADMIN — MIDODRINE HYDROCHLORIDE 5 MG: 5 TABLET ORAL at 08:17

## 2021-02-15 ASSESSMENT — PAIN DESCRIPTION - ORIENTATION: ORIENTATION: LOWER

## 2021-02-15 ASSESSMENT — PAIN SCALES - GENERAL
PAINLEVEL_OUTOF10: 6
PAINLEVEL_OUTOF10: 0
PAINLEVEL_OUTOF10: 6

## 2021-02-15 ASSESSMENT — PAIN DESCRIPTION - PAIN TYPE: TYPE: ACUTE PAIN

## 2021-02-15 ASSESSMENT — PAIN DESCRIPTION - PROGRESSION: CLINICAL_PROGRESSION: NOT CHANGED

## 2021-02-15 ASSESSMENT — PAIN DESCRIPTION - DESCRIPTORS: DESCRIPTORS: ACHING

## 2021-02-15 ASSESSMENT — PAIN DESCRIPTION - ONSET: ONSET: ON-GOING

## 2021-02-15 ASSESSMENT — PAIN DESCRIPTION - FREQUENCY: FREQUENCY: INTERMITTENT

## 2021-02-15 NOTE — PLAN OF CARE
Problem: Anger Management/Homicidal Ideation:  Goal: Absence of violence  2/15/2021 0333 by Toro Deras RN  Outcome: Ongoing  Note: No violence this shift. Goal: Absence of angry outbursts  Description: Absence of angry outbursts  2/15/2021 1040 by Breanna Tsang RN  Outcome: Ongoing  Note: No outburst at this time  2/15/2021 0333 by Toro Deras RN  Outcome: Ongoing  Note: No outbursts this shift. Problem: Altered Mood, Depressive Behavior:  Goal: No signs of physiological stress  Description: No signs of physiological stress  2/15/2021 0333 by Toro Deras RN  Outcome: Ongoing  Note: Patient reports that she is \"sad. \"  Goal: STG-Able to verbalize suicidal ideations  2/15/2021 0333 by Toro Deras RN  Outcome: Ongoing  Note: Current thoughts to harm self denied. Problem: Altered Mood, Psychotic Behavior:  Goal: Able to verbalize reality based thinking  Description: Able to verbalize reality based thinking  2/15/2021 1040 by Breanna Tsang RN  Outcome: Ongoing  Note: Oriented x 3, disoriented to situation  2/15/2021 0333 by Toro Deras RN  Outcome: Ongoing  Note: Patient is not oriented to situation and is somatic. Patient also reports \"being scared. \"      Problem: Intellectual/Education/Knowledge Deficit  Goal: Highest potential functional level  2/15/2021 0333 by Toro Deras RN  Outcome: Ongoing  Note: Education and guidance needed. Patient refused adls this shift. Problem: Discharge Planning:  Goal: Knowledge of discharge plan  2/15/2021 0333 by Toro Deras RN  Outcome: Ongoing  Note: Discharge planning is in progress. Goal: Other  Description: Collaborate with guardian regarding discharge planning.   2/15/2021 0333 by Toro Deras RN  Outcome: Ongoing     Problem: Role Relationship:  Goal: Ability to demonstrate positive changes in social behaviors and relationships will improve Description: Ability to demonstrate positive changes in social behaviors and relationships will improve  2/15/2021 1040 by Constanza Collado RN  Outcome: Ongoing  Note: States \"I'm being good today\"  2/15/2021 0333 by Vicki Alaniz RN  Outcome: Ongoing     Problem: Pain:  Goal: Able to cope with pain  Description: Able to cope with pain  2/15/2021 0333 by Vicki Alaniz RN  Outcome: Ongoing  Note: Patient reported a headache, refer to flow sheets. Care plan reviewed with patient . Patient  verbalize understanding of the plan of care and contribute to goal setting.

## 2021-02-15 NOTE — PLAN OF CARE
Problem: Anger Management/Homicidal Ideation:  Goal: Absence of violence  2/15/2021 0333 by Lo Fernandez RN  Outcome: Ongoing  Note: No violence this shift. 2/14/2021 1416 by Taryn Valadez RN  Outcome: Met This Shift  Note: Patient has no episodes of violence this shift. Goal: Absence of angry outbursts  Description: Absence of angry outbursts  2/15/2021 0333 by Lo Fernandez RN  Outcome: Ongoing  Note: No outbursts this shift. 2/14/2021 1416 by Taryn Valadez RN  Outcome: Met This Shift  Note: Patient has no angry outbursts this shift. Problem: Altered Mood, Depressive Behavior:  Goal: No signs of physiological stress  Description: No signs of physiological stress  2/15/2021 0333 by Lo Fernandez RN  Outcome: Ongoing  Note: Patient reports that she is \"sad. \"  2/14/2021 1416 by Taryn Valadez RN  Outcome: Met This Shift  Note: No signs or symptoms of physiological stress noted. Goal: STG-Able to verbalize suicidal ideations  2/15/2021 0333 by Lo Fernandez RN  Outcome: Ongoing  Note: Current thoughts to harm self denied. 2/14/2021 1416 by Taryn Valadez RN  Outcome: Met This Shift  Note: Patient denies suicidal ideations this shift. Problem: Intellectual/Education/Knowledge Deficit  Goal: Highest potential functional level  2/15/2021 0333 by Lo Fernandez RN  Outcome: Ongoing  Note: Education and guidance needed. Patient refused adls this shift. 2/14/2021 1416 by Taryn Valadez RN  Outcome: Ongoing  Note: Patient continues to work toward his highest functional level with care team.      Problem: Discharge Planning:  Goal: Knowledge of discharge plan  2/15/2021 0333 by Lo Fernandez RN  Outcome: Ongoing  Note: Discharge planning is in progress. 2/14/2021 1416 by Taryn Valadez RN  Outcome: Not Met This Shift  Note: Patient not discharged this shift.  Patient continues to work toward discharge goal with care team.   Goal: Other Description: Collaborate with guardian regarding discharge planning. Outcome: Ongoing     Problem: General Medical Problem-Behavioral Health  Goal: Compliance with prescribed medication regimen will improve  Description: Compliance with prescribed medication regimen will improve  2/15/2021 0333 by Nasra Resendiz RN  Outcome: Ongoing  Note: Patient compliant with medications. 2/14/2021 1416 by Franklyn Fay RN  Outcome: Met This Shift  Note: Patient takes all medications as prescribed this shift. Problem: Pain:  Goal: Able to cope with pain  Description: Able to cope with pain  2/15/2021 0333 by Nasra Resendiz RN  Outcome: Ongoing  Note: Patient reported a headache, refer to flow sheets. 2/14/2021 1416 by Franklyn Fay RN  Outcome: Met This Shift  Note: Patient denies having pain this shift. Problem: Anxiety:  Goal: Level of anxiety will decrease  Description: Level of anxiety will decrease  2/15/2021 0333 by Nasra Resendiz RN  Outcome: Ongoing  Note: Patient reports anxiety, was able to redirect. 2/14/2021 1416 by Franklyn Fay RN  Outcome: Ongoing  Note: Patient reports continued feelings of anxiety this shift. Problem: Activity:  Goal: Sleeping patterns will improve  Description: Sleeping patterns will improve  2/15/2021 0333 by Nasra Resendiz RN  Outcome: Ongoing  Note: Patient currently resting with eyes closed, no distress noted. 2/14/2021 1416 by Franklyn Fay RN  Outcome: Ongoing  Note: Patient's reported sleep is 4.5 hours continuous. Problem: Role Relationship:  Goal: Ability to demonstrate positive changes in social behaviors and relationships will improve  Description: Ability to demonstrate positive changes in social behaviors and relationships will improve  2/15/2021 0333 by Nasra Resendiz RN  Outcome: Ongoing  2/14/2021 1416 by Franklyn Fay RN  Outcome: Ongoing  Note: Patient isolated to bed to this point in the shift.    2/14/2021 1347 by Aide Lambert Outcome: Ongoing  Note: Pt has not attended any of the groups that are offered on the unit so far this shift. Pt has been in her room and has not been interacting with peers. Pt will be encouraged to attend the groups offered on the unit and to come out of her room and interact with peers. Pt progress towards socialization goal is ongoing. Problem: Altered Mood, Psychotic Behavior:  Goal: Able to verbalize reality based thinking  Description: Able to verbalize reality based thinking  2/15/2021 0333 by Ashish Ferreira RN  Outcome: Ongoing  Note: Patient is not oriented to situation and is somatic. Patient also reports \"being scared. \"   2/14/2021 1416 by Faustino Meza RN  Outcome: Ongoing  Note: Patient alert and oriented x3. Patient disoriented x3   Care plan reviewed with patient.   Patient does verbalize understanding of the plan of care and does contribute to goal setting

## 2021-02-15 NOTE — GROUP NOTE
Group Therapy Note    Date: 2/15/2021    Group Start Time: 1430  Group End Time: 1500  Group Topic: Healthy Living/Wellness    [facility]    Catalino Pryor LPN        Group Therapy Note    Attendees: 6       Notes:  Did not attend    Discipline Responsible: Licensed Practical Nurse      Signature:  Catalino Pryor LPN
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
## 2021-02-15 NOTE — PROGRESS NOTES
Patient requesting to go to Emergency, patient call her mother, mother called unit and states Haresh Son is having back pain, that's why she wants to go to the emergency room\" Motrin given to patient for #6 back discomfort. Patient informed, her doctor will be in tomorrow and she can talk with him regarding her discomfort.

## 2021-02-15 NOTE — BH NOTE
Pt did not attend 0467 goal group and community meeting. Pt received maximum encouragement to attend. Pt did not participate in 1:1 session with staff. Pt did not attend [phone] recreation therapy group session. Pt received maximum encouragement to attend group. Independent leisure pursuits are available on the unit for use.     Signature: Genie Cancino

## 2021-02-16 PROCEDURE — 6370000000 HC RX 637 (ALT 250 FOR IP): Performed by: PSYCHIATRY & NEUROLOGY

## 2021-02-16 PROCEDURE — 6370000000 HC RX 637 (ALT 250 FOR IP): Performed by: PHYSICIAN ASSISTANT

## 2021-02-16 PROCEDURE — 99232 SBSQ HOSP IP/OBS MODERATE 35: CPT | Performed by: PHYSICIAN ASSISTANT

## 2021-02-16 PROCEDURE — 6360000002 HC RX W HCPCS: Performed by: PSYCHIATRY & NEUROLOGY

## 2021-02-16 PROCEDURE — 2500000003 HC RX 250 WO HCPCS: Performed by: PSYCHIATRY & NEUROLOGY

## 2021-02-16 PROCEDURE — 1240000000 HC EMOTIONAL WELLNESS R&B

## 2021-02-16 RX ORDER — DIVALPROEX SODIUM 500 MG/1
500 TABLET, EXTENDED RELEASE ORAL EVERY EVENING
Status: DISCONTINUED | OUTPATIENT
Start: 2021-02-16 | End: 2021-02-17

## 2021-02-16 RX ADMIN — LORAZEPAM 2 MG: 2 INJECTION, SOLUTION INTRAMUSCULAR; INTRAVENOUS at 19:09

## 2021-02-16 RX ADMIN — QUETIAPINE FUMARATE 100 MG: 100 TABLET ORAL at 10:35

## 2021-02-16 RX ADMIN — TRAZODONE HYDROCHLORIDE 50 MG: 50 TABLET ORAL at 21:06

## 2021-02-16 RX ADMIN — ALPRAZOLAM 1 MG: 0.5 TABLET ORAL at 10:34

## 2021-02-16 RX ADMIN — QUETIAPINE FUMARATE 400 MG: 100 TABLET ORAL at 21:13

## 2021-02-16 RX ADMIN — METOPROLOL TARTRATE 25 MG: 25 TABLET ORAL at 10:34

## 2021-02-16 RX ADMIN — MIDODRINE HYDROCHLORIDE 5 MG: 5 TABLET ORAL at 17:17

## 2021-02-16 RX ADMIN — IBUPROFEN 400 MG: 200 TABLET, FILM COATED ORAL at 21:14

## 2021-02-16 RX ADMIN — MIDODRINE HYDROCHLORIDE 5 MG: 5 TABLET ORAL at 10:32

## 2021-02-16 RX ADMIN — ALPRAZOLAM 1 MG: 0.5 TABLET ORAL at 21:14

## 2021-02-16 RX ADMIN — PRAVASTATIN SODIUM 20 MG: 20 TABLET ORAL at 21:14

## 2021-02-16 RX ADMIN — SERTRALINE 150 MG: 100 TABLET, FILM COATED ORAL at 10:35

## 2021-02-16 RX ADMIN — DOCUSATE SODIUM 100 MG: 100 CAPSULE, LIQUID FILLED ORAL at 10:34

## 2021-02-16 RX ADMIN — ALPRAZOLAM 1 MG: 0.5 TABLET ORAL at 14:34

## 2021-02-16 RX ADMIN — CHLORPROMAZINE HYDROCHLORIDE 50 MG: 25 INJECTION INTRAMUSCULAR at 19:13

## 2021-02-16 RX ADMIN — DIVALPROEX SODIUM 500 MG: 500 TABLET, EXTENDED RELEASE ORAL at 18:13

## 2021-02-16 RX ADMIN — PANTOPRAZOLE SODIUM 40 MG: 40 TABLET, DELAYED RELEASE ORAL at 10:34

## 2021-02-16 RX ADMIN — QUETIAPINE FUMARATE 100 MG: 100 TABLET ORAL at 21:16

## 2021-02-16 RX ADMIN — METOPROLOL TARTRATE 25 MG: 25 TABLET ORAL at 21:14

## 2021-02-16 ASSESSMENT — PAIN SCALES - GENERAL
PAINLEVEL_OUTOF10: 0
PAINLEVEL_OUTOF10: 0

## 2021-02-16 ASSESSMENT — PAIN DESCRIPTION - DESCRIPTORS: DESCRIPTORS: ACHING;DISCOMFORT

## 2021-02-16 ASSESSMENT — PAIN - FUNCTIONAL ASSESSMENT: PAIN_FUNCTIONAL_ASSESSMENT: ACTIVITIES ARE NOT PREVENTED

## 2021-02-16 ASSESSMENT — PAIN DESCRIPTION - FREQUENCY: FREQUENCY: INTERMITTENT

## 2021-02-16 ASSESSMENT — PAIN DESCRIPTION - LOCATION: LOCATION: ARM

## 2021-02-16 ASSESSMENT — PAIN DESCRIPTION - PAIN TYPE: TYPE: ACUTE PAIN

## 2021-02-16 NOTE — PLAN OF CARE
Problem: Anger Management/Homicidal Ideation:  Goal: Absence of violence  2/16/2021 1058 by Keon Salinas RN  Outcome: Ongoing  Note: Patient calm and cooperative so far this shift. Problem: Anger Management/Homicidal Ideation:  Goal: Absence of angry outbursts  Description: Absence of angry outbursts  2/16/2021 1058 by Keon Salinas RN  Outcome: Ongoing  Note: Patient remains free from angry outbursts so far this shift. Problem: Altered Mood, Depressive Behavior:  Goal: No signs of physiological stress  Description: No signs of physiological stress  2/16/2021 1058 by Keon Salinas RN  Outcome: Ongoing  Note: Patient shows no signs of physiological stress at present time. Problem: Altered Mood, Depressive Behavior:  Goal: STG-Able to verbalize suicidal ideations  2/16/2021 1058 by Keon Salinas RN  Outcome: Ongoing  Note: Patient denies suicidal ideations at present time. Problem: Altered Mood, Psychotic Behavior:  Goal: Able to verbalize reality based thinking  Description: Able to verbalize reality based thinking  2/16/2021 1058 by Keon Salinas RN  Outcome: Ongoing  Note: Patient alert and oriented x 3, not to situation. Problem: Intellectual/Education/Knowledge Deficit  Goal: Highest potential functional level  2/16/2021 1058 by Keon Salinas RN  Outcome: Ongoing  Note: Patient able to perform activities of daily living. Problem: Discharge Planning:  Goal: Knowledge of discharge plan  2/16/2021 1058 by Keon Salinas RN  Outcome: Ongoing  Note: Patient voices no needs before discharge. Patient plans to be discharged to home with mom. Discharge planner working with patient to achieve optimal discharge plans, specific to individual needs.        Problem: Pain:  Goal: Able to cope with pain  Description: Able to cope with pain  2/16/2021 1058 by Keon Salinas RN  Outcome: Ongoing  Note: Pain Assessment: 0-10  Pain Level: 0 Patient's Stated Pain Goal: 2   Is pain goal met at this time? Yes   Patient denies pain this am.  Non-Pharmaceutical Pain Intervention(s): Rest       Problem: Anxiety:  Goal: Level of anxiety will decrease  Description: Level of anxiety will decrease  2/16/2021 1058 by Rosa Werner RN  Outcome: Ongoing  Note: Patient denies anxiety so far this shift. Problem: General Medical Problem-Behavioral Health  Goal: Compliance with prescribed medication regimen will improve  Description: Compliance with prescribed medication regimen will improve  2/16/2021 1058 by Rosa Werner RN  Outcome: Ongoing  Note: Patient taking medications as prescribed except refused to take glycolax. Care plan reviewed with patient. Patient verbalize understanding of the plan of care and contribute to goal setting.
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
## 2021-02-16 NOTE — PROGRESS NOTES
Case management [id number]-Telephoned the [address] [place] [address] to confirm that pt has a Legal Guardian as reported by pt mother. I requested a return telephone call.
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
## 2021-02-17 PROCEDURE — 6370000000 HC RX 637 (ALT 250 FOR IP): Performed by: PSYCHIATRY & NEUROLOGY

## 2021-02-17 PROCEDURE — 94640 AIRWAY INHALATION TREATMENT: CPT

## 2021-02-17 PROCEDURE — 99232 SBSQ HOSP IP/OBS MODERATE 35: CPT | Performed by: PSYCHIATRY & NEUROLOGY

## 2021-02-17 PROCEDURE — 94760 N-INVAS EAR/PLS OXIMETRY 1: CPT

## 2021-02-17 PROCEDURE — 1240000000 HC EMOTIONAL WELLNESS R&B

## 2021-02-17 PROCEDURE — APPSS30 APP SPLIT SHARED TIME 16-30 MINUTES: Performed by: PHYSICIAN ASSISTANT

## 2021-02-17 PROCEDURE — 6360000002 HC RX W HCPCS: Performed by: PHYSICIAN ASSISTANT

## 2021-02-17 PROCEDURE — 6370000000 HC RX 637 (ALT 250 FOR IP): Performed by: PHYSICIAN ASSISTANT

## 2021-02-17 RX ADMIN — SERTRALINE 150 MG: 100 TABLET, FILM COATED ORAL at 08:39

## 2021-02-17 RX ADMIN — ALPRAZOLAM 1 MG: 0.5 TABLET ORAL at 21:32

## 2021-02-17 RX ADMIN — PRAVASTATIN SODIUM 20 MG: 20 TABLET ORAL at 21:30

## 2021-02-17 RX ADMIN — TRAZODONE HYDROCHLORIDE 50 MG: 50 TABLET ORAL at 21:34

## 2021-02-17 RX ADMIN — METOPROLOL TARTRATE 25 MG: 25 TABLET ORAL at 21:30

## 2021-02-17 RX ADMIN — QUETIAPINE FUMARATE 100 MG: 100 TABLET ORAL at 08:40

## 2021-02-17 RX ADMIN — MIDODRINE HYDROCHLORIDE 5 MG: 5 TABLET ORAL at 08:40

## 2021-02-17 RX ADMIN — MIDODRINE HYDROCHLORIDE 5 MG: 5 TABLET ORAL at 17:58

## 2021-02-17 RX ADMIN — QUETIAPINE FUMARATE 400 MG: 100 TABLET ORAL at 21:30

## 2021-02-17 RX ADMIN — DOCUSATE SODIUM 100 MG: 100 CAPSULE, LIQUID FILLED ORAL at 08:40

## 2021-02-17 RX ADMIN — PANTOPRAZOLE SODIUM 40 MG: 40 TABLET, DELAYED RELEASE ORAL at 08:40

## 2021-02-17 RX ADMIN — ALPRAZOLAM 1 MG: 0.5 TABLET ORAL at 08:40

## 2021-02-17 RX ADMIN — BUDESONIDE 1000 MCG: 0.5 INHALANT RESPIRATORY (INHALATION) at 09:15

## 2021-02-17 RX ADMIN — IBUPROFEN 400 MG: 200 TABLET, FILM COATED ORAL at 21:47

## 2021-02-17 RX ADMIN — BUDESONIDE 1000 MCG: 0.5 INHALANT RESPIRATORY (INHALATION) at 21:47

## 2021-02-17 RX ADMIN — DIVALPROEX SODIUM 750 MG: 250 TABLET, FILM COATED, EXTENDED RELEASE ORAL at 17:58

## 2021-02-17 RX ADMIN — ALPRAZOLAM 1 MG: 0.5 TABLET ORAL at 17:58

## 2021-02-17 RX ADMIN — METOPROLOL TARTRATE 25 MG: 25 TABLET ORAL at 08:40

## 2021-02-17 ASSESSMENT — PAIN DESCRIPTION - PAIN TYPE: TYPE: ACUTE PAIN

## 2021-02-17 ASSESSMENT — PAIN SCALES - GENERAL: PAINLEVEL_OUTOF10: 0

## 2021-02-17 ASSESSMENT — PAIN DESCRIPTION - PROGRESSION: CLINICAL_PROGRESSION: NOT CHANGED

## 2021-02-17 ASSESSMENT — PAIN - FUNCTIONAL ASSESSMENT
PAIN_FUNCTIONAL_ASSESSMENT: ACTIVITIES ARE NOT PREVENTED
PAIN_FUNCTIONAL_ASSESSMENT: ACTIVITIES ARE NOT PREVENTED

## 2021-02-17 ASSESSMENT — PAIN DESCRIPTION - LOCATION: LOCATION: ARM

## 2021-02-17 NOTE — PROGRESS NOTES
sertraline (ZOLOFT) tablet 150 mg, 150 mg, Oral, QAM  pravastatin (PRAVACHOL) tablet 20 mg, 20 mg, Oral, Nightly  pantoprazole (PROTONIX) tablet 40 mg, 40 mg, Oral, QAM AC  midodrine (PROAMATINE) tablet 5 mg, 5 mg, Oral, BID WC  acetaminophen (TYLENOL) tablet 500 mg, 500 mg, Oral, Q6H PRN  albuterol (PROVENTIL) nebulizer solution 2.5 mg, 2.5 mg, Nebulization, Q6H PRN  ALPRAZolam (XANAX) tablet 1 mg, 1 mg, Oral, TID  budesonide (PULMICORT) nebulizer suspension 1,000 mcg, 1 mg, Nebulization, BID  docusate sodium (COLACE) capsule 100 mg, 100 mg, Oral, Daily  polyethylene glycol (GLYCOLAX) powder 17 g, 17 g, Oral, Daily  acetaminophen (TYLENOL) tablet 650 mg, 650 mg, Oral, Q4H PRN  ibuprofen (ADVIL;MOTRIN) tablet 400 mg, 400 mg, Oral, Q6H PRN  magnesium hydroxide (MILK OF MAGNESIA) 400 MG/5ML suspension 30 mL, 30 mL, Oral, Daily PRN  aluminum & magnesium hydroxide-simethicone (MAALOX) 200-200-20 MG/5ML suspension 30 mL, 30 mL, Oral, Q6H PRN  hydrOXYzine (ATARAX) tablet 50 mg, 50 mg, Oral, TID PRN  LORazepam (ATIVAN) tablet 2 mg, 2 mg, Oral, Q6H PRN **OR** LORazepam (ATIVAN) injection 2 mg, 2 mg, Intramuscular, Q6H PRN  chlorproMAZINE (THORAZINE) tablet 50 mg, 50 mg, Oral, Q6H PRN **OR** chlorproMAZINE (THORAZINE) injection 50 mg, 50 mg, Intramuscular, Q6H PRN  traZODone (DESYREL) tablet 50 mg, 50 mg, Oral, Nightly PRN  influenza quadrivalent split vaccine (FLUZONE;FLUARIX;FLULAVAL;AFLURIA) injection 0.5 mL, 0.5 mL, Intramuscular, Prior to discharge     Physical     height is 5' 3\" (1.6 m) and weight is 149 lb (67.6 kg). Her tympanic temperature is 98.6 °F (37 °C). Her blood pressure is 108/67 and her pulse is 82. Her respiration is 16 and oxygen saturation is 99%.    Lab Results   Component Value Date    WBC 7.4 02/08/2021    HGB 13.5 02/08/2021    HCT 41.1 02/08/2021     02/08/2021    CHOL 162 03/27/2019    TRIG 105 03/27/2019    HDL 36 03/27/2019    ALT 18 02/08/2021    AST 22 02/08/2021     02/08/2021 Patient was agreeing to stay calm and talk her feelings when she is getting angry. However staff mentioned last night that she got very irritable and was hitting them. Patient's mother is worried that she is going to escalate if she is going to come back home this way. She has been very unpredictable and her behaviors continue to escalate at times. We will continue to titrate her mood stabilizer. Electronically signed by Elma Villalobos MD on 2/17/21 at 8:46 PM EST    **This report has been created using voice recognition software. It may contain minor errors which are inherent in voice recognition technology. **

## 2021-02-17 NOTE — PROGRESS NOTES
1479 Patient came to nurses station, states \"my arm hurts\", ask patient if she wanted tylenol, patient states \"no, I am angry\", tossed a cup in the nurses station. Offered pills, took patient ativan and thorazine pills, patient threw pills in hallway. Patient then tried to [facility], will medicate with injections of ativan and thorazine. [place] police notified for show of support. Patient took injections with [place] police at bedside.

## 2021-02-17 NOTE — PROGRESS NOTES
Patient was able to maintain control after having received the IM medication. Patient does verbalize understanding .

## 2021-02-17 NOTE — PLAN OF CARE
Problem: Anger Management/Homicidal Ideation:  Goal: Absence of violence  2/16/2021 2251 by Krystle Hager RN  Outcome: Met This Shift  Note: No violence this shift. Patient calm and cooperative. 2/16/2021 1058 by Anne Marie Gonzalez RN  Outcome: Ongoing  Note: Patient calm and cooperative so far this shift. Goal: Absence of angry outbursts  Description: Absence of angry outbursts  2/16/2021 2251 by Krystle Hager RN  Outcome: Met This Shift  Note: No angry outbursts this shift. 2/16/2021 1058 by Anne Marie Gonzalez RN  Outcome: Ongoing  Note: Patient remains free from angry outbursts so far this shift. Problem: Altered Mood, Depressive Behavior:  Goal: Participates in care planning  Description: Participates in care planning  2/16/2021 2251 by Krystle Hager RN  Outcome: Not Met This Shift  Note: Care plan reviewed with patient. Patient does not verbalize understanding of the plan of care and does not contribute to goal setting   2/16/2021 2250 by Krystle Hager RN  Reactivated  Goal: No signs of physiological stress  Description: No signs of physiological stress  2/16/2021 2251 by Krystle Hager RN  Outcome: Met This Shift  Note: No signs of psychological distress this shift. 2/16/2021 1058 by Anne Marie Gonzalez RN  Outcome: Ongoing  Note: Patient shows no signs of physiological stress at present time. Goal: STG-Able to verbalize suicidal ideations  2/16/2021 2251 by Krystle Hager RN  Outcome: Met This Shift  Note: Patient denies suicidal thoughts this shift. 2/16/2021 1058 by Anne Marie Gonzalez RN  Outcome: Ongoing  Note: Patient denies suicidal ideations at present time. Problem: Altered Mood, Psychotic Behavior:  Goal: Able to verbalize reality based thinking  Description: Able to verbalize reality based thinking  2/16/2021 2251 by Krystle Hager RN  Outcome: Ongoing  Note: Patient is unable to verbalize the reason for admission.    2/16/2021 1058 by Anne Marie Gonzalez RN Outcome: Ongoing  Note: Patient alert and oriented x 3, not to situation. Problem: Intellectual/Education/Knowledge Deficit  Goal: Highest potential functional level  2/16/2021 2251 by Annabelle Schwab RN  Outcome: Met This Shift  Note: Patient is at her highest potential functioning level although does have moments of acting out behaviors. 2/16/2021 1058 by Sherrie Byrd RN  Outcome: Ongoing  Note: Patient able to perform activities of daily living. Problem: Discharge Planning:  Goal: Discharged to appropriate level of care  Description: Discharged to appropriate level of care  2/16/2021 2251 by Annabelle Schwab RN  Outcome: Ongoing  Note: Discharge planning is in process. 2/16/2021 2250 by Annabelle Schwab RN  Reactivated  Goal: Knowledge of discharge plan  2/16/2021 2251 by Annabelle Schwab RN  Outcome: Completed  Note: Luba Chauhan is able to verbalize that she will be going home on discharge. 2/16/2021 1058 by Sherrie Byrd RN  Outcome: Ongoing  Note: Patient voices no needs before discharge. Patient plans to be discharged to home with mom. Discharge planner working with patient to achieve optimal discharge plans, specific to individual needs. Problem: Role Relationship:  Goal: Ability to demonstrate positive changes in social behaviors and relationships will improve  Description: Ability to demonstrate positive changes in social behaviors and relationships will improve  Outcome: Ongoing     Problem: Pain:  Description: Pain management should include both nonpharmacologic and pharmacologic interventions. Goal: Pain level will decrease  Description: Pain level will decrease  2/16/2021 2251 by Annabelle Schwab RN  Note: Patient complains of left arm pain. PRN Motrin given with relief obtained.    2/16/2021 2250 by Annabelle Schwab RN  Reactivated  Goal: Able to cope with pain  Description: Able to cope with pain  2/16/2021 2251 by Annabelle Schwab RN  Outcome: Not Met This Shift Note: Patient was unable to use therapeutic techniques for pain. 2/16/2021 1058 by Gail Razo RN  Outcome: Ongoing  Note: Pain Assessment: 0-10  Pain Level: 0   Patient's Stated Pain Goal: 2   Is pain goal met at this time? Yes   Patient denies pain this am.  Non-Pharmaceutical Pain Intervention(s): Rest       Problem: Anxiety:  Goal: Level of anxiety will decrease  Description: Level of anxiety will decrease  2/16/2021 2251 by Mila Nam RN  Outcome: Ongoing  Note: Patient reports that she feels anxious at times. Medication given for behaviors by the [facility] staff. 2/16/2021 1058 by Gail Razo RN  Outcome: Ongoing  Note: Patient denies anxiety so far this shift. Problem: General Medical Problem-Behavioral Health  Goal: Compliance with prescribed medication regimen will improve  Description: Compliance with prescribed medication regimen will improve  2/16/2021 2251 by Mila Nam RN  Outcome: Met This Shift  Note: Patient took her medications as scheduled this shift. 2/16/2021 1058 by Gail Razo RN  Outcome: Ongoing  Note: Patient taking medications as prescribed except refused to take glycolax.

## 2021-02-18 PROCEDURE — 6360000002 HC RX W HCPCS: Performed by: PHYSICIAN ASSISTANT

## 2021-02-18 PROCEDURE — APPSS30 APP SPLIT SHARED TIME 16-30 MINUTES: Performed by: PHYSICIAN ASSISTANT

## 2021-02-18 PROCEDURE — 99232 SBSQ HOSP IP/OBS MODERATE 35: CPT | Performed by: PSYCHIATRY & NEUROLOGY

## 2021-02-18 PROCEDURE — 6370000000 HC RX 637 (ALT 250 FOR IP): Performed by: PSYCHIATRY & NEUROLOGY

## 2021-02-18 PROCEDURE — 6370000000 HC RX 637 (ALT 250 FOR IP): Performed by: PHYSICIAN ASSISTANT

## 2021-02-18 PROCEDURE — 94760 N-INVAS EAR/PLS OXIMETRY 1: CPT

## 2021-02-18 PROCEDURE — 94640 AIRWAY INHALATION TREATMENT: CPT

## 2021-02-18 PROCEDURE — 1240000000 HC EMOTIONAL WELLNESS R&B

## 2021-02-18 RX ORDER — POLYETHYLENE GLYCOL 3350 17 G/17G
17 POWDER, FOR SOLUTION ORAL DAILY
Status: DISCONTINUED | OUTPATIENT
Start: 2021-02-18 | End: 2021-02-22 | Stop reason: HOSPADM

## 2021-02-18 RX ORDER — DIVALPROEX SODIUM 500 MG/1
1000 TABLET, EXTENDED RELEASE ORAL EVERY EVENING
Status: DISCONTINUED | OUTPATIENT
Start: 2021-02-19 | End: 2021-02-22 | Stop reason: HOSPADM

## 2021-02-18 RX ADMIN — SERTRALINE 150 MG: 100 TABLET, FILM COATED ORAL at 08:48

## 2021-02-18 RX ADMIN — POLYETHYLENE GLYCOL 3350 17 G: 17 POWDER, FOR SOLUTION ORAL at 08:47

## 2021-02-18 RX ADMIN — PANTOPRAZOLE SODIUM 40 MG: 40 TABLET, DELAYED RELEASE ORAL at 08:47

## 2021-02-18 RX ADMIN — TRAZODONE HYDROCHLORIDE 50 MG: 50 TABLET ORAL at 21:00

## 2021-02-18 RX ADMIN — BUDESONIDE 1000 MCG: 0.5 INHALANT RESPIRATORY (INHALATION) at 08:23

## 2021-02-18 RX ADMIN — QUETIAPINE FUMARATE 400 MG: 100 TABLET ORAL at 20:59

## 2021-02-18 RX ADMIN — MIDODRINE HYDROCHLORIDE 5 MG: 5 TABLET ORAL at 17:31

## 2021-02-18 RX ADMIN — MIDODRINE HYDROCHLORIDE 5 MG: 5 TABLET ORAL at 08:47

## 2021-02-18 RX ADMIN — PRAVASTATIN SODIUM 20 MG: 20 TABLET ORAL at 20:59

## 2021-02-18 RX ADMIN — DIVALPROEX SODIUM 750 MG: 250 TABLET, FILM COATED, EXTENDED RELEASE ORAL at 17:31

## 2021-02-18 RX ADMIN — ALPRAZOLAM 1 MG: 0.5 TABLET ORAL at 15:27

## 2021-02-18 RX ADMIN — ALPRAZOLAM 1 MG: 0.5 TABLET ORAL at 08:47

## 2021-02-18 RX ADMIN — METOPROLOL TARTRATE 25 MG: 25 TABLET ORAL at 20:59

## 2021-02-18 RX ADMIN — BUDESONIDE 1000 MCG: 0.5 INHALANT RESPIRATORY (INHALATION) at 18:00

## 2021-02-18 RX ADMIN — QUETIAPINE FUMARATE 100 MG: 100 TABLET ORAL at 08:47

## 2021-02-18 RX ADMIN — ALPRAZOLAM 1 MG: 0.5 TABLET ORAL at 20:59

## 2021-02-18 RX ADMIN — DOCUSATE SODIUM 100 MG: 100 CAPSULE, LIQUID FILLED ORAL at 08:47

## 2021-02-18 ASSESSMENT — PAIN DESCRIPTION - PAIN TYPE: TYPE: ACUTE PAIN

## 2021-02-18 ASSESSMENT — PAIN DESCRIPTION - ORIENTATION: ORIENTATION: LEFT

## 2021-02-18 ASSESSMENT — PAIN SCALES - GENERAL
PAINLEVEL_OUTOF10: 6
PAINLEVEL_OUTOF10: 0

## 2021-02-18 ASSESSMENT — PAIN DESCRIPTION - LOCATION: LOCATION: ARM

## 2021-02-18 ASSESSMENT — PAIN DESCRIPTION - DESCRIPTORS: DESCRIPTORS: ACHING;DISCOMFORT

## 2021-02-18 ASSESSMENT — PAIN SCALES - WONG BAKER: WONGBAKER_NUMERICALRESPONSE: 0
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
## 2021-02-18 NOTE — PROGRESS NOTES
4545: Spoke with Lashanda Penny at [employer]. Lashanda Penny informed this SW that patient mother, Kevin Pruitt, is NOT patient's guardian.

## 2021-02-18 NOTE — PLAN OF CARE
Patient has not attended any groups today and has not been out of her room to socialize with others so she has not met her socialization goal for this shift. Patient will be encouraged to attend groups on the unit and to come out of her room to socialize with others daily during the rest of her hospital stay.

## 2021-02-18 NOTE — PROGRESS NOTES
pravastatin (PRAVACHOL) tablet 20 mg, 20 mg, Oral, Nightly  pantoprazole (PROTONIX) tablet 40 mg, 40 mg, Oral, QAM AC  midodrine (PROAMATINE) tablet 5 mg, 5 mg, Oral, BID WC  acetaminophen (TYLENOL) tablet 500 mg, 500 mg, Oral, Q6H PRN  albuterol (PROVENTIL) nebulizer solution 2.5 mg, 2.5 mg, Nebulization, Q6H PRN  ALPRAZolam (XANAX) tablet 1 mg, 1 mg, Oral, TID  budesonide (PULMICORT) nebulizer suspension 1,000 mcg, 1 mg, Nebulization, BID  docusate sodium (COLACE) capsule 100 mg, 100 mg, Oral, Daily  acetaminophen (TYLENOL) tablet 650 mg, 650 mg, Oral, Q4H PRN  ibuprofen (ADVIL;MOTRIN) tablet 400 mg, 400 mg, Oral, Q6H PRN  magnesium hydroxide (MILK OF MAGNESIA) 400 MG/5ML suspension 30 mL, 30 mL, Oral, Daily PRN  aluminum & magnesium hydroxide-simethicone (MAALOX) 200-200-20 MG/5ML suspension 30 mL, 30 mL, Oral, Q6H PRN  hydrOXYzine (ATARAX) tablet 50 mg, 50 mg, Oral, TID PRN  LORazepam (ATIVAN) tablet 2 mg, 2 mg, Oral, Q6H PRN **OR** LORazepam (ATIVAN) injection 2 mg, 2 mg, Intramuscular, Q6H PRN  chlorproMAZINE (THORAZINE) tablet 50 mg, 50 mg, Oral, Q6H PRN **OR** chlorproMAZINE (THORAZINE) injection 50 mg, 50 mg, Intramuscular, Q6H PRN  traZODone (DESYREL) tablet 50 mg, 50 mg, Oral, Nightly PRN  influenza quadrivalent split vaccine (FLUZONE;FLUARIX;FLULAVAL;AFLURIA) injection 0.5 mL, 0.5 mL, Intramuscular, Prior to discharge     Physical     height is 5' 3\" (1.6 m) and weight is 149 lb (67.6 kg). Her oral temperature is 97.4 °F (36.3 °C). Her blood pressure is 98/58 (abnormal) and her pulse is 76. Her respiration is 16 and oxygen saturation is 99%.    Lab Results   Component Value Date    WBC 7.4 02/08/2021    HGB 13.5 02/08/2021    HCT 41.1 02/08/2021     02/08/2021    CHOL 162 03/27/2019    TRIG 105 03/27/2019    HDL 36 03/27/2019    ALT 18 02/08/2021    AST 22 02/08/2021     02/08/2021    K 3.2 (L) 02/08/2021     02/08/2021    CREATININE 0.7 02/08/2021    BUN 7 02/08/2021 CO2 26 02/08/2021    TSH 0.968 02/08/2021    INR 1.15 (H) 02/08/2017    LABA1C 5.6 01/21/2021          Mental Status Exam:   Level of consciousness:  somnolent  Appearance:  well-appearing, street clothes and lying in bed  Behavior/Motor:  calm  Attitude toward examiner:  cooperative, attentive and good eye contact  Speech:  Childlike, slow, mumbled  Mood:  \"good\" per patient  Affect:  reactive  Thought processes:  concrete  Thought content:  Denies homicidal ideation  Suicidal Ideation:  denies suicidal ideation  Delusions:  no evidence of delusions  Perceptual Disturbance:  denies any perceptual disturbance  Cognition: Patient is oriented to person, place, time and situation  Concentration: clinically adequate  Memory: impaired   Insight & Judgement: impaired due to intellectual disability      ASSESSMENT     Unspecified mood (affective) disorder ([facility] [address]   Rule out bipolar disorder  Intellectual disability       PLAN    Patient's symptoms show some improvement today  Will increase Depakote to 1000mg in the evening  Attempt to develop insight, psycho-education and supportive therapy conducted. Probable discharge: Monday  Follow-up: [address]    Electronically signed by Patric Potter PA-C on 2/18/2021 at 1:22 PM Reviewed patient's current plan of care and vital signs with nursing staff. Psychiatry Attending Attestation     I assessed this patient and reviewed the case and plan of care with Patric Potter PA-C. I have reviewed the above documentation and I agree with the findings and treatment plan with the following updates. Patient is more restrained and redirectable since increasing the dose of Depakote. Yesterday was the first day that she did not act out since she has been here. We will continue to titrate her Depakote. Needed clarification on the guardianship. Possible discharge as early next week if she continues to improve. Electronically signed by Davie Ray MD on 2/18/21 at 8:32 PM EST    **This report has been created using voice recognition software. It may contain minor errors which are inherent in voice recognition technology. **

## 2021-02-18 NOTE — PLAN OF CARE
2/17/2021 1930 by Carlos Wilcox LPN  Outcome: Ongoing  Note: Patient unable to verbalize reality based thinking      Problem: Intellectual/Education/Knowledge Deficit  Goal: Highest potential functional level  2/17/2021 2342 by Daniela Evans RN  Outcome: Completed  Note: Patient is at her highest functional level. 2/17/2021 1930 by Carlos Wilcox LPN  Outcome: Ongoing  Note: Patient functioning at highest potential level      Problem: Discharge Planning:  Goal: Discharged to appropriate level of care  Description: Discharged to appropriate level of care  2/17/2021 2342 by Daniela Evans RN  Outcome: Ongoing  Note: Discharge planning is in process. 2/17/2021 1930 by Carlos Wilcox LPN  Outcome: Ongoing  Note: Discharge planner working with patient to achieve optimal discharge plan, specific to the needs of this patient. Problem: Role Relationship:  Goal: Ability to demonstrate positive changes in social behaviors and relationships will improve  Description: Ability to demonstrate positive changes in social behaviors and relationships will improve  2/17/2021 2342 by Daniela Evans RN  Outcome: Ongoing  2/17/2021 1930 by Carlos Wilcox LPN  Outcome: Ongoing  Note: Patient unable to demonstrate positive changes in social behaviors and relationships will improve   2/17/2021 1550 by Sandeep Alva  Outcome: Not Met This Shift     Problem: Pain:  Description: Pain management should include both nonpharmacologic and pharmacologic interventions. Goal: Pain level will decrease  Description: Pain level will decrease  2/17/2021 2342 by Daniela Evans RN  Outcome: Not Met This Shift  Note: Patient continues to have complaints of right arm pain. Patient given prn Motrin with relief obtained.   2/17/2021 1930 by Carlos Wilcox LPN  Outcome: Ongoing  Note: Patient pain level reported @0  Goal: Able to cope with pain

## 2021-02-18 NOTE — PROGRESS NOTES
This RN has reviewed and agrees with  Elian Greenberg LPN's data collection and has collaborated with this LPN regarding the patient's care plan.

## 2021-02-18 NOTE — PROGRESS NOTES
Post-fall pharmacy consult    Pharmacy has been consulted to review medication profile for fall risk.   Medications increasing risk of falling: lopressor (decreases bp); xanax, atarax, thorazine, depakote, ativan, seroquel, zoloft, trazodone all increase dizziness; milk of mag and docusate increase trips to bath room which increase risk of falling  Medications increasing risk of bleeding:   Findings discussed with Elizabeth Stephens RN  Recommendations: rn to notify md

## 2021-02-18 NOTE — PLAN OF CARE
Problem: Anger Management/Homicidal Ideation:  Goal: Absence of violence  Outcome: Ongoing  Note: Free from any violence so far this shift. Goal: Absence of angry outbursts  Description: Absence of angry outbursts  Outcome: Ongoing  Note: Free from any anger outburst so far this shift. Problem: Altered Mood, Depressive Behavior:  Goal: Participates in care planning  Description: Participates in care planning  Outcome: Ongoing  Note: Care plan reviewed with patient. Patient does verbalize understanding of the plan of care and does contribute to goal setting   Goal: No signs of physiological stress  Description: No signs of physiological stress  Outcome: Ongoing  Note: Shows no physiological stress. Goal: STG-Able to verbalize suicidal ideations  Outcome: Ongoing  Note: Denies suicidal thoughts. Problem: Discharge Planning:  Goal: Discharged to appropriate level of care  Description: Discharged to appropriate level of care  Outcome: Ongoing  Note: Discharge planning in process. Problem: General Medical Problem-Behavioral Health  Goal: Compliance with prescribed medication regimen will improve  Description: Compliance with prescribed medication regimen will improve  Outcome: Ongoing  Note: Taking medications as directed. Problem: Pain:  Goal: Pain level will decrease  Description: Pain level will decrease  Outcome: Ongoing  Note: Reports left arm pain where she received an injection medication. Goal: Able to cope with pain  Description: Able to cope with pain  Outcome: Ongoing     Problem: Anxiety:  Goal: Level of anxiety will decrease  Description: Level of anxiety will decrease  Outcome: Ongoing  Note: Denies anxiety.       Problem: Role Relationship:  Goal: Ability to demonstrate positive changes in social behaviors and relationships will improve  Description: Ability to demonstrate positive changes in social behaviors and relationships will improve  Outcome: Ongoing Problem: Altered Mood, Psychotic Behavior:  Goal: Able to verbalize reality based thinking  Description: Able to verbalize reality based thinking  Outcome: Ongoing  Note: Patient is oriented to person, place and situation. Patient believes that it is February 14th 2021. Problem: Falls - Risk of:  Goal: Will remain free from falls  Description: Will remain free from falls  Outcome: Ongoing  Note: Patient was found on floor this morning. Reports her pants fell down while she was walking back to her bed. No injuries noted.    Goal: Absence of physical injury  Description: Absence of physical injury  Outcome: Ongoing

## 2021-02-19 PROCEDURE — 2500000003 HC RX 250 WO HCPCS: Performed by: PSYCHIATRY & NEUROLOGY

## 2021-02-19 PROCEDURE — APPSS30 APP SPLIT SHARED TIME 16-30 MINUTES: Performed by: PHYSICIAN ASSISTANT

## 2021-02-19 PROCEDURE — 1240000000 HC EMOTIONAL WELLNESS R&B

## 2021-02-19 PROCEDURE — 6370000000 HC RX 637 (ALT 250 FOR IP): Performed by: PHYSICIAN ASSISTANT

## 2021-02-19 PROCEDURE — 94640 AIRWAY INHALATION TREATMENT: CPT

## 2021-02-19 PROCEDURE — 94760 N-INVAS EAR/PLS OXIMETRY 1: CPT

## 2021-02-19 PROCEDURE — 6360000002 HC RX W HCPCS: Performed by: PHYSICIAN ASSISTANT

## 2021-02-19 PROCEDURE — 99232 SBSQ HOSP IP/OBS MODERATE 35: CPT | Performed by: PSYCHIATRY & NEUROLOGY

## 2021-02-19 PROCEDURE — 6370000000 HC RX 637 (ALT 250 FOR IP): Performed by: PSYCHIATRY & NEUROLOGY

## 2021-02-19 RX ADMIN — CHLORPROMAZINE HYDROCHLORIDE 50 MG: 25 INJECTION INTRAMUSCULAR at 22:02

## 2021-02-19 RX ADMIN — ALPRAZOLAM 1 MG: 0.5 TABLET ORAL at 21:21

## 2021-02-19 RX ADMIN — QUETIAPINE FUMARATE 100 MG: 100 TABLET ORAL at 09:02

## 2021-02-19 RX ADMIN — ALPRAZOLAM 1 MG: 0.5 TABLET ORAL at 09:02

## 2021-02-19 RX ADMIN — PANTOPRAZOLE SODIUM 40 MG: 40 TABLET, DELAYED RELEASE ORAL at 09:02

## 2021-02-19 RX ADMIN — QUETIAPINE FUMARATE 400 MG: 100 TABLET ORAL at 21:21

## 2021-02-19 RX ADMIN — MIDODRINE HYDROCHLORIDE 5 MG: 5 TABLET ORAL at 09:01

## 2021-02-19 RX ADMIN — DIVALPROEX SODIUM 1000 MG: 500 TABLET, EXTENDED RELEASE ORAL at 17:36

## 2021-02-19 RX ADMIN — DOCUSATE SODIUM 100 MG: 100 CAPSULE, LIQUID FILLED ORAL at 09:02

## 2021-02-19 RX ADMIN — ACETAMINOPHEN 500 MG: 500 TABLET ORAL at 18:48

## 2021-02-19 RX ADMIN — METOPROLOL TARTRATE 25 MG: 25 TABLET ORAL at 09:02

## 2021-02-19 RX ADMIN — SERTRALINE 150 MG: 100 TABLET, FILM COATED ORAL at 09:01

## 2021-02-19 RX ADMIN — IBUPROFEN 400 MG: 200 TABLET, FILM COATED ORAL at 09:02

## 2021-02-19 RX ADMIN — PRAVASTATIN SODIUM 20 MG: 20 TABLET ORAL at 21:21

## 2021-02-19 RX ADMIN — POLYETHYLENE GLYCOL 3350 17 G: 17 POWDER, FOR SOLUTION ORAL at 09:08

## 2021-02-19 RX ADMIN — BUDESONIDE 1000 MCG: 0.5 INHALANT RESPIRATORY (INHALATION) at 10:27

## 2021-02-19 RX ADMIN — MIDODRINE HYDROCHLORIDE 5 MG: 5 TABLET ORAL at 17:36

## 2021-02-19 ASSESSMENT — PAIN DESCRIPTION - PAIN TYPE: TYPE: ACUTE PAIN

## 2021-02-19 ASSESSMENT — PAIN SCALES - GENERAL: PAINLEVEL_OUTOF10: 4

## 2021-02-19 NOTE — PROGRESS NOTES
Department of Psychiatry  Progress Note     Chief Complaint:  Unspecified mood (affective) disorder ([facility] [address]     SUBJECTIVE:    PROGRESS:  Lisette Espinoza is somnolent on examination. She is seen resting in her bed. Lisette Espinoza reports her mood is good today. She rated her mood an 8/10 with 10 being the best this morning. She denies feeling sad and angry today. She has been behaviorally in control per staff. Did not have any incidents in over 48 hours. She denies any thoughts of harm to herself or others today  She reports she slept good last night. Staff reported she slept 8 hours broken and has been sleeping during the day. She has been eating well on the unit. She comes out to get her meal trays and eats in her room. She has not been attending groups  Lisette Espinoza has been compliant with and tolerating her medications well per staff. Her mother Good Martinez has been visiting and talking on the phone with her on the unit     Will call her mother Good Martinez to update her on the patient's progress and discuss discharge planning with her. Suicidal ideations: denies    Compliance with medications: good   Medication side effects: denies  ROS: Patient has new complaints:  no  Sleep quality: 8 hours broken last night per staff.    Attending groups: no      OBJECTIVE      Medications  Current Facility-Administered Medications: polyethylene glycol (GLYCOLAX) packet 17 g, 17 g, Oral, Daily  divalproex (DEPAKOTE ER) extended release tablet 1,000 mg, 1,000 mg, Oral, QPM  QUEtiapine (SEROQUEL) tablet 100 mg, 100 mg, Oral, Daily  QUEtiapine (SEROQUEL) tablet 400 mg, 400 mg, Oral, Nightly  chlorproMAZINE (THORAZINE) tablet 50 mg, 50 mg, Oral, Once  metoprolol tartrate (LOPRESSOR) tablet 25 mg, 25 mg, Oral, BID  sertraline (ZOLOFT) tablet 150 mg, 150 mg, Oral, QAM  pravastatin (PRAVACHOL) tablet 20 mg, 20 mg, Oral, Nightly  pantoprazole (PROTONIX) tablet 40 mg, 40 mg, Oral, QAM AC  midodrine (PROAMATINE) tablet 5 mg, 5 mg, Oral, BID WC acetaminophen (TYLENOL) tablet 500 mg, 500 mg, Oral, Q6H PRN  albuterol (PROVENTIL) nebulizer solution 2.5 mg, 2.5 mg, Nebulization, Q6H PRN  ALPRAZolam (XANAX) tablet 1 mg, 1 mg, Oral, TID  budesonide (PULMICORT) nebulizer suspension 1,000 mcg, 1 mg, Nebulization, BID  docusate sodium (COLACE) capsule 100 mg, 100 mg, Oral, Daily  acetaminophen (TYLENOL) tablet 650 mg, 650 mg, Oral, Q4H PRN  ibuprofen (ADVIL;MOTRIN) tablet 400 mg, 400 mg, Oral, Q6H PRN  magnesium hydroxide (MILK OF MAGNESIA) 400 MG/5ML suspension 30 mL, 30 mL, Oral, Daily PRN  aluminum & magnesium hydroxide-simethicone (MAALOX) 200-200-20 MG/5ML suspension 30 mL, 30 mL, Oral, Q6H PRN  hydrOXYzine (ATARAX) tablet 50 mg, 50 mg, Oral, TID PRN  LORazepam (ATIVAN) tablet 2 mg, 2 mg, Oral, Q6H PRN **OR** LORazepam (ATIVAN) injection 2 mg, 2 mg, Intramuscular, Q6H PRN  chlorproMAZINE (THORAZINE) tablet 50 mg, 50 mg, Oral, Q6H PRN **OR** chlorproMAZINE (THORAZINE) injection 50 mg, 50 mg, Intramuscular, Q6H PRN  traZODone (DESYREL) tablet 50 mg, 50 mg, Oral, Nightly PRN  influenza quadrivalent split vaccine (FLUZONE;FLUARIX;FLULAVAL;AFLURIA) injection 0.5 mL, 0.5 mL, Intramuscular, Prior to discharge     Physical     height is 5' 3\" (1.6 m) and weight is 149 lb (67.6 kg). Her oral temperature is 98.2 °F (36.8 °C). Her blood pressure is 110/54 (abnormal) and her pulse is 82. Her respiration is 18 and oxygen saturation is 98%.    Lab Results   Component Value Date    WBC 7.4 02/08/2021    HGB 13.5 02/08/2021    HCT 41.1 02/08/2021     02/08/2021    CHOL 162 03/27/2019    TRIG 105 03/27/2019    HDL 36 03/27/2019    ALT 18 02/08/2021    AST 22 02/08/2021     02/08/2021    K 3.2 (L) 02/08/2021     02/08/2021    CREATININE 0.7 02/08/2021    BUN 7 02/08/2021    CO2 26 02/08/2021    TSH 0.968 02/08/2021    INR 1.15 (H) 02/08/2017    LABA1C 5.6 01/21/2021          Mental Status Exam:   Level of consciousness:  somnolent Appearance:  well-appearing, street clothes and lying in bed  Behavior/Motor:  calm  Attitude toward examiner:  cooperative, attentive and good eye contact  Speech:  Childlike, slow, mumbled  Mood:  \"good\" per patient  Affect:  reactive  Thought processes:  concrete  Thought content:  Denies homicidal ideation  Suicidal Ideation:  denies suicidal ideation  Delusions:  no evidence of delusions  Perceptual Disturbance:  denies any perceptual disturbance  Cognition: Patient is oriented to person, place, time and situation  Concentration: clinically adequate  Memory: impaired   Insight & Judgement: impaired due to intellectual disability      ASSESSMENT     Unspecified mood (affective) disorder ([facility] [address]   Rule out bipolar disorder  Intellectual disability       PLAN    Patient's symptoms show some improvement today  Will continue current medication regimen  Attempt to develop insight, psycho-education and supportive therapy conducted. Probable discharge: Monday  Follow-up: [facility]    Electronically signed by Anna Fonseca PA-C on 2/19/2021 at 9:06 AM Reviewed patient's current plan of care and vital signs with nursing staff. Psychiatry Attending Attestation     I assessed this patient and reviewed the case and plan of care with Anna Fonseca PA-C. I have reviewed the above documentation and I agree with the findings and treatment plan with the following updates. Patient has no acute incidents over the last 48 hours. She is able to refrain herself and is easily redirectable. Will discharge her on Monday morning if she continues to improve. We will continue to titrate her Depakote. Will obtain a Depakote level on Jeremiah morning. Electronically signed by Lovella Cogan, MD on 2/19/21 at 11:57 PM EST    **This report has been created using voice recognition software. It may contain minor errors which are inherent in voice recognition technology. **

## 2021-02-19 NOTE — GROUP NOTE
Group Therapy Note    Date: 2/19/2021    Group Start Time: 1100  Group End Time: 1130  Group Topic: Healthy Living/Wellness    [facility] 4E    Joey Hills RN        Group Therapy Note    Attendees: Patient did not attend        Discipline Responsible: Registered Nurse      Signature:  Joey Hills RN

## 2021-02-19 NOTE — PLAN OF CARE
Problem: Anger Management/Homicidal Ideation:  Goal: Absence of violence  2/18/2021 2153 by Jason Askew RN  Outcome: Completed  Note: No violent behaviors. 2/18/2021 1441 by Hermila Sanz RN  Outcome: Ongoing  Note: Free from any violence so far this shift. Goal: Absence of angry outbursts  Description: Absence of angry outbursts  2/18/2021 2153 by Jason Askew RN  Outcome: Met This Shift  Note: No angry outbursts. 2/18/2021 1441 by Hermila Sanz RN  Outcome: Ongoing  Note: Free from any anger outburst so far this shift. Problem: Altered Mood, Depressive Behavior:  Goal: Participates in care planning  Description: Participates in care planning  2/18/2021 2153 by Jason Askew RN  Outcome: Not Met This Shift  Note: Care plan reviewed with patient. Patient does verbalize understanding of the plan of care and does not contribute to goal setting . 2/18/2021 1441 by Hermila Sanz RN  Outcome: Ongoing  Note: Care plan reviewed with patient. Patient does verbalize understanding of the plan of care and does contribute to goal setting   Goal: No signs of physiological stress  Description: No signs of physiological stress  2/18/2021 2153 by Jason Askew RN  Outcome: Completed  Note: No signs of physiological stress. 2/18/2021 1441 by Hermila Sanz RN  Outcome: Ongoing  Note: Shows no physiological stress. Goal: STG-Able to verbalize suicidal ideations  2/18/2021 2153 by Jason Askew RN  Outcome: Met This Shift  Note: Patient denies suicidal thoughts this shift. 2/18/2021 1441 by Hermila Sanz RN  Outcome: Ongoing  Note: Denies suicidal thoughts. Problem: Altered Mood, Psychotic Behavior:  Goal: Able to verbalize reality based thinking  Description: Able to verbalize reality based thinking  2/18/2021 2153 by Jason Askew RN  Outcome: Met This Shift  Note: Patient verbalizes understand of her treatment plan. Problem: Falls - Risk of:  Goal: Will remain free from falls  Description: Will remain free from falls  2/18/2021 2153 by Anival Trejo RN  Outcome: Met This Shift  Note: Patient remains free from falls this shift. 2/18/2021 1441 by Lias León RN  Outcome: Ongoing  Note: Patient was found on floor this morning. Reports her pants fell down while she was walking back to her bed. No injuries noted. Goal: Absence of physical injury  Description: Absence of physical injury  2/18/2021 2153 by Anival Trejo RN  Note: Patient remains free from physical.  2/18/2021 1441 by Lisa León RN  Outcome: Ongoing     Problem: General Medical Problem-Behavioral Health  Goal: Compliance with prescribed medication regimen will improve  Description: Compliance with prescribed medication regimen will improve  2/18/2021 2153 by Anival Trejo RN  Outcome: Met This Shift  Note: Patient took her medications as prescribed. 2/18/2021 1441 by Lisa León RN  Outcome: Ongoing  Note: Taking medications as directed.

## 2021-02-19 NOTE — PLAN OF CARE
Patient has not attended any of the groups and has been isolating in her room all day so she has not met her socialization goal for this shift. Patient will be encouraged to come out of her room for social interaction with others and to attend all groups on the unit daily.

## 2021-02-19 NOTE — PLAN OF CARE
Problem: Anger Management/Homicidal Ideation:  Goal: Absence of angry outbursts  Description: Absence of angry outbursts  Outcome: Ongoing  Note: So far this shift patient has not had any anger outburst.      Problem: Altered Mood, Depressive Behavior:  Goal: Participates in care planning  Description: Participates in care planning  Outcome: Ongoing  Note: Care plan reviewed with patient. Patient does not verbalize understanding of the plan of care and does not contribute to goal setting   Goal: STG-Able to verbalize suicidal ideations  Outcome: Ongoing  Note: Denies suicidal thoughts. Problem: Discharge Planning:  Goal: Discharged to appropriate level of care  Description: Discharged to appropriate level of care  Outcome: Ongoing  Note: Discharge planning in process. Problem: General Medical Problem-Behavioral Health  Goal: Compliance with prescribed medication regimen will improve  Description: Compliance with prescribed medication regimen will improve  Outcome: Ongoing     Problem: Pain:  Goal: Pain level will decrease  Description: Pain level will decrease  Outcome: Ongoing  Note: Reports having left arm pain. Goal: Able to cope with pain  Description: Able to cope with pain  Outcome: Ongoing     Problem: Anxiety:  Goal: Level of anxiety will decrease  Description: Level of anxiety will decrease  Outcome: Ongoing  Note: Denies any anxiety.       Problem: Role Relationship:  Goal: Ability to demonstrate positive changes in social behaviors and relationships will improve  Description: Ability to demonstrate positive changes in social behaviors and relationships will improve  2/19/2021 1613 by Alona Hamman, RN  Outcome: Ongoing  2/19/2021 1249 by Erick Armijo  Outcome: Not Met This Shift     Problem: Altered Mood, Psychotic Behavior:  Goal: Able to verbalize reality based thinking  Description: Able to verbalize reality based thinking  Outcome: Ongoing     Problem: Falls - Risk of: Goal: Will remain free from falls  Description: Will remain free from falls  Outcome: Ongoing  Goal: Absence of physical injury  Description: Absence of physical injury  Outcome: Ongoing

## 2021-02-20 PROCEDURE — 6370000000 HC RX 637 (ALT 250 FOR IP): Performed by: PHYSICIAN ASSISTANT

## 2021-02-20 PROCEDURE — 6370000000 HC RX 637 (ALT 250 FOR IP): Performed by: PSYCHIATRY & NEUROLOGY

## 2021-02-20 PROCEDURE — 94760 N-INVAS EAR/PLS OXIMETRY 1: CPT

## 2021-02-20 PROCEDURE — 94640 AIRWAY INHALATION TREATMENT: CPT

## 2021-02-20 PROCEDURE — 99231 SBSQ HOSP IP/OBS SF/LOW 25: CPT | Performed by: NURSE PRACTITIONER

## 2021-02-20 PROCEDURE — 6360000002 HC RX W HCPCS: Performed by: PHYSICIAN ASSISTANT

## 2021-02-20 PROCEDURE — 1240000000 HC EMOTIONAL WELLNESS R&B

## 2021-02-20 RX ADMIN — DOCUSATE SODIUM 100 MG: 100 CAPSULE, LIQUID FILLED ORAL at 10:03

## 2021-02-20 RX ADMIN — ALPRAZOLAM 1 MG: 0.5 TABLET ORAL at 21:29

## 2021-02-20 RX ADMIN — POLYETHYLENE GLYCOL 3350 17 G: 17 POWDER, FOR SOLUTION ORAL at 10:02

## 2021-02-20 RX ADMIN — PANTOPRAZOLE SODIUM 40 MG: 40 TABLET, DELAYED RELEASE ORAL at 10:03

## 2021-02-20 RX ADMIN — BUDESONIDE 1000 MCG: 0.5 INHALANT RESPIRATORY (INHALATION) at 18:02

## 2021-02-20 RX ADMIN — PRAVASTATIN SODIUM 20 MG: 20 TABLET ORAL at 21:29

## 2021-02-20 RX ADMIN — ALPRAZOLAM 1 MG: 0.5 TABLET ORAL at 10:02

## 2021-02-20 RX ADMIN — QUETIAPINE FUMARATE 100 MG: 100 TABLET ORAL at 09:56

## 2021-02-20 RX ADMIN — SERTRALINE 150 MG: 100 TABLET, FILM COATED ORAL at 09:56

## 2021-02-20 RX ADMIN — TRAZODONE HYDROCHLORIDE 50 MG: 50 TABLET ORAL at 21:29

## 2021-02-20 RX ADMIN — MIDODRINE HYDROCHLORIDE 5 MG: 5 TABLET ORAL at 18:01

## 2021-02-20 RX ADMIN — MIDODRINE HYDROCHLORIDE 5 MG: 5 TABLET ORAL at 09:53

## 2021-02-20 RX ADMIN — QUETIAPINE FUMARATE 400 MG: 100 TABLET ORAL at 21:29

## 2021-02-20 RX ADMIN — METOPROLOL TARTRATE 25 MG: 25 TABLET ORAL at 21:28

## 2021-02-20 RX ADMIN — BUDESONIDE 1000 MCG: 0.5 INHALANT RESPIRATORY (INHALATION) at 08:06

## 2021-02-20 RX ADMIN — DIVALPROEX SODIUM 1000 MG: 500 TABLET, EXTENDED RELEASE ORAL at 18:01

## 2021-02-20 ASSESSMENT — PAIN DESCRIPTION - FREQUENCY: FREQUENCY: INTERMITTENT

## 2021-02-20 ASSESSMENT — PAIN DESCRIPTION - PAIN TYPE: TYPE: ACUTE PAIN

## 2021-02-20 ASSESSMENT — PAIN - FUNCTIONAL ASSESSMENT: PAIN_FUNCTIONAL_ASSESSMENT: ACTIVITIES ARE NOT PREVENTED

## 2021-02-20 NOTE — GROUP NOTE
Group Therapy Note    Date: 2/20/2021    Group Start Time: 9770  Group End Time: 1945  Group Topic: Healthy Living/Wellness    [facility]    Elroy Sierra LPN        Group Therapy Note    Attendees: 7      Notes:  Did not attend    Discipline Responsible: Licensed Practical Nurse      Signature:  Elroy Sierra LPN

## 2021-02-20 NOTE — PROGRESS NOTES
Psychiatry Progress Note        CC: Unspecified mood (affective) disorder (HCC)   Rule out bipolar disorder  Intellectual disability                                                                           Subjective     Progress:  Julius Corcoran  reports feeling better. . Denies feelings of harm towards self or others currently. No aggressive behaviors have been reported. Reports meds are still working \"good\" Denies having side effects. Good med compliance is verified. Reports appetite is still  better and reports slept \"good\" last night. Verified slept 7.5 hours continuous last night. Denies going to groups. Julius Corcoran reports her mother visits daily      Objective  BP (!) 86/54 Comment: Patient asleep  Pulse 72   Temp 96.5 °F (35.8 °C) (Oral)   Resp 16   Ht 5' 3\" (1.6 m)   Wt 149 lb (67.6 kg)   SpO2 98%   BMI 26.39 kg/m²       MSE:  Level of consciousness: Alert  Appearance: hospital attire, in chair and fair grooming   Behavior/Motor: Calm   Attitude toward examiner: cooperative   Speech: Normal volume, Circumstantial   Mood: Euthymic  Affect: Blunt  Thought processes: [place]  Suicidal Ideation: Denies suicidal ideations  Homicidal ideation: Denies homicidal ideations  Delusions: No evidence of delusions is observed  Perceptual Disturbance: Denies AH/VH  Cognition: Oriented to person, place, time   Concentration fair   Memory intact   Insight: Limited  Judgment\" Limited     Assessment:  Unspecified mood (affective) disorder (HCC)   Rule out bipolar disorder  Intellectual disability      Plan:  Continue current meds as ordered  Continue to encourage group attendance.        Luis Alvarez, CNP  2-      Mood Disorder    I concur with above clinical findings and plan of care.

## 2021-02-20 NOTE — PROGRESS NOTES
This RN has reviewed and agrees with LILI Baig LPN's data collection and has collaborated with this LPN regarding the patient's care plan.

## 2021-02-20 NOTE — PLAN OF CARE
Problem: Anger Management/Homicidal Ideation:  Goal: Absence of angry outbursts  Description: Absence of angry outbursts  Outcome: Ongoing  Note: Patient remained free from angry outbursts at this time during shift. Problem: Altered Mood, Depressive Behavior:  Goal: Participates in care planning  Description: Participates in care planning  Outcome: Ongoing  Note: Patient participates in care planning with staff during shift. Problem: Discharge Planning:  Goal: Discharged to appropriate level of care  Description: Discharged to appropriate level of care  Outcome: Ongoing  Note: No discharge plans noted at this time during shift. Problem: General Medical Problem-Behavioral Health  Goal: Compliance with prescribed medication regimen will improve  Description: Compliance with prescribed medication regimen will improve  Outcome: Ongoing  Note: Patient compliant with medication at this time during shift. Problem: Anxiety:  Goal: Level of anxiety will decrease  Description: Level of anxiety will decrease  Outcome: Ongoing  Note: Patient denies anxiety during shift assessment. Problem: Role Relationship:  Goal: Ability to demonstrate positive changes in social behaviors and relationships will improve  Description: Ability to demonstrate positive changes in social behaviors and relationships will improve  Outcome: Ongoing  Note: Good socialization with mom and uncle during visit. Otherwise patient isolates to bed and room. Problem: Altered Mood, Psychotic Behavior:  Goal: Able to verbalize reality based thinking  Description: Able to verbalize reality based thinking  Outcome: Ongoing  Note: Patient continues to work on verbalization of reality based thinking. Problem: Falls - Risk of:  Goal: Will remain free from falls  Description: Will remain free from falls  Outcome: Ongoing  Note: Patient remains free from falls at this time during shift.   Goal: Absence of physical injury Description: Absence of physical injury  Outcome: Ongoing  Note: Patient remains free from physical injury at this time during shift. Problem: Impaired respiratory status  Goal: Clear lung sounds  Description: Clear lung sounds  2/20/2021 0813 by Jayson Wiely RCP  Outcome: Ongoing    Care plan reviewed with patient.   Patient does not verbalize understanding of the plan of care and does not contribute to goal setting

## 2021-02-20 NOTE — PLAN OF CARE
Problem: Anger Management/Homicidal Ideation:  Goal: Absence of angry outbursts  Description: Absence of angry outbursts  2/20/2021 0316 by Carolyn Ulloa RN  Outcome: Not Met This Shift  Note: Patient had some angry outbursts this evening. Patient hitting and throwing things at staff. Prn IM injection given to help calm patient down. 2/19/2021 1613 by Jo Rodriguez RN  Outcome: Ongoing  Note: So far this shift patient has not had any anger outburst.      Problem: Altered Mood, Depressive Behavior:  Goal: Participates in care planning  Description: Participates in care planning  2/20/2021 0316 by Carolyn Ulloa RN  Outcome: Ongoing  Note: Care plan reviewed with patient. Patient does not verbalize understanding of the plan of care and does not contribute to goal setting. 2/19/2021 1613 by Jo Rodriguez RN  Outcome: Ongoing  Note: Care plan reviewed with patient. Patient does not verbalize understanding of the plan of care and does not contribute to goal setting   Goal: STG-Able to verbalize suicidal ideations  2/20/2021 0316 by Carolyn Ulloa RN  Outcome: Completed  Note: Patient denies any suicidal thoughts. 2/19/2021 1613 by Jo Rodriguez RN  Outcome: Ongoing  Note: Denies suicidal thoughts. Problem: Altered Mood, Psychotic Behavior:  Goal: Able to verbalize reality based thinking  Description: Able to verbalize reality based thinking  2/20/2021 0316 by Carolyn Ulloa RN  Outcome: Ongoing  Note: Ongoing. 2/19/2021 1613 by Jo Rodriguez RN  Outcome: Ongoing     Problem: Discharge Planning:  Goal: Discharged to appropriate level of care  Description: Discharged to appropriate level of care  2/20/2021 0316 by Carolyn Ulloa RN  Outcome: Not Met This Shift  Note: Discharge planning in progress, patient not discharged this evening.      2/19/2021 1613 by Jo Rodriguez RN  Outcome: Ongoing 2/19/2021 1613 by Brett Rodriguez, RN  Outcome: Ongoing

## 2021-02-21 PROCEDURE — 6370000000 HC RX 637 (ALT 250 FOR IP): Performed by: PSYCHIATRY & NEUROLOGY

## 2021-02-21 PROCEDURE — 6370000000 HC RX 637 (ALT 250 FOR IP): Performed by: PHYSICIAN ASSISTANT

## 2021-02-21 PROCEDURE — 94640 AIRWAY INHALATION TREATMENT: CPT

## 2021-02-21 PROCEDURE — 6360000002 HC RX W HCPCS: Performed by: PHYSICIAN ASSISTANT

## 2021-02-21 PROCEDURE — 99231 SBSQ HOSP IP/OBS SF/LOW 25: CPT | Performed by: NURSE PRACTITIONER

## 2021-02-21 PROCEDURE — 1240000000 HC EMOTIONAL WELLNESS R&B

## 2021-02-21 RX ADMIN — MIDODRINE HYDROCHLORIDE 5 MG: 5 TABLET ORAL at 07:54

## 2021-02-21 RX ADMIN — QUETIAPINE FUMARATE 100 MG: 100 TABLET ORAL at 07:54

## 2021-02-21 RX ADMIN — TRAZODONE HYDROCHLORIDE 50 MG: 50 TABLET ORAL at 21:16

## 2021-02-21 RX ADMIN — POLYETHYLENE GLYCOL 3350 17 G: 17 POWDER, FOR SOLUTION ORAL at 07:54

## 2021-02-21 RX ADMIN — MIDODRINE HYDROCHLORIDE 5 MG: 5 TABLET ORAL at 16:42

## 2021-02-21 RX ADMIN — SERTRALINE 150 MG: 100 TABLET, FILM COATED ORAL at 07:54

## 2021-02-21 RX ADMIN — DIVALPROEX SODIUM 1000 MG: 500 TABLET, EXTENDED RELEASE ORAL at 16:42

## 2021-02-21 RX ADMIN — METOPROLOL TARTRATE 25 MG: 25 TABLET ORAL at 21:16

## 2021-02-21 RX ADMIN — METOPROLOL TARTRATE 25 MG: 25 TABLET ORAL at 07:54

## 2021-02-21 RX ADMIN — QUETIAPINE FUMARATE 400 MG: 100 TABLET ORAL at 21:16

## 2021-02-21 RX ADMIN — BUDESONIDE 1000 MCG: 0.5 INHALANT RESPIRATORY (INHALATION) at 08:46

## 2021-02-21 RX ADMIN — ALPRAZOLAM 1 MG: 0.5 TABLET ORAL at 21:16

## 2021-02-21 RX ADMIN — ALPRAZOLAM 1 MG: 0.5 TABLET ORAL at 07:54

## 2021-02-21 RX ADMIN — PRAVASTATIN SODIUM 20 MG: 20 TABLET ORAL at 21:16

## 2021-02-21 RX ADMIN — ALPRAZOLAM 1 MG: 0.5 TABLET ORAL at 13:49

## 2021-02-21 RX ADMIN — DOCUSATE SODIUM 100 MG: 100 CAPSULE, LIQUID FILLED ORAL at 07:54

## 2021-02-21 RX ADMIN — PANTOPRAZOLE SODIUM 40 MG: 40 TABLET, DELAYED RELEASE ORAL at 07:54

## 2021-02-21 NOTE — PATIENT CARE CONFERENCE
[facility]  Day 14/Weekly Interdisciplinary Treatment Plan NOTE    2/21/21 4000    Patient was in treatment team    Admission Type:   Admission Type: Voluntary    Reason for admission:  Reason for Admission: \"I was bad at home and I threw stuff at my mom\". Estimated Length of Stay Update:  7-14 days  Estimated Discharge Date Update: 7-14 days    PATIENT STRENGTHS:  Patient Strengths Strengths: Positive Support, Connection to output provider, Medication Compliance  Patient Strengths and Limitations:Limitations: Difficulty problem solving/relies on others to help solve problems, Limited education -> difficulty reading or writing  Addictive Behavior:Addictive Behavior  In the past 3 months, have you felt or has someone told you that you have a problem with:  : None  Do you have a history of Chemical Use?: No  Do you have a history of Alcohol Use?: No  Do you have a history of Street Drug Abuse?: No  Histroy of Prescripton Drug Abuse?: No  Medical Problems:  Past Medical History:   Diagnosis Date    Anemia     Asthma     Cerebral palsy ([facility]     [facility]     GERD (gastroesophageal reflux disease)     Heart attack ([facility] 3/2016    Hyperlipidemia     Mental retardation     Schizophrenia simplex ([facility]        Risk:  Fall RiskTotal: 93  Yunier Scale Yunier Scale Score: 22  BVC Total: 0  Change in scores no.  Changes to plan of Care  no    Status EXAM:   Status and Exam  Normal: No  Facial Expression: Flat, Sad  Affect: Appropriate, Blunt  Level of Consciousness: Alert  Mood:Normal: No  Mood: Sad  Motor Activity:Normal: No  Motor Activity: Decreased  Interview Behavior: Cooperative  Preception: [place] to Person, [place]  to Time, [place] to Place, [place] to Situation  Attention:Normal: No  Attention: Distractible  Thought Processes: Circumstantial  Thought Content:Normal: No  Thought Content: Poverty of Content  Hallucinations: None(denies)  Delusions: No Delusions: (denies)  Memory:Normal: No  Memory: Poor Recent, Poor Remote  Insight and Judgment: No  Insight and Judgment: Poor Judgment, Poor Insight  Present Suicidal Ideation: No(denies)  Present Homicidal Ideation: No(denies)    Daily Assessment Last Entry:   Daily Sleep (WDL): Within Defined Limits         Patient Currently in Pain: Denies  Daily Nutrition (WDL): Within Defined Limits  Appetite Change: Decreased  Barriers to Nutrition: None  Level of Assistance: Independent/Self    Patient Monitoring:  Frequency of Checks: 4 times per hour, close    Psychiatric Symptoms:   Depression Symptoms  Depression Symptoms: No problems reported or observed. Anxiety Symptoms  Anxiety Symptoms: Generalized  Carrol Symptoms  Carrol Symptoms: No problems reported or observed. Psychosis Symptoms  Delusion Type: No problems reported or observed. Suicide Risk CSSR-S:  1) Within the past month, have you wished you were dead or wished you could go to sleep and not wake up? : Yes  2) Have you actually had any thoughts of killing yourself? : Yes  3) Have you been thinking about how you might kill yourself? : Yes  5) Have you started to work out or worked out the details of how to kill yourself? Do you intend to carry out this plan? : No  6) Have you ever done anything, started to do anything, or prepared to do anything to end your life?: Yes  Change in Result no Change in Plan of care no      EDUCATION:   Learner Progress Toward Treatment Goals: Reviewed results and recommendations of this team, Reviewed group plan and strategies and Reviewed goals and plan of care    Method: Individual    Outcome: Verbalized understanding and Needs reinforcement    PATIENT GOALS: Listen to music    PLAN/TREATMENT RECOMMENDATIONS UPDATE:  1. Are there any lingering problems that need to be addressed? None  2. Discharge plans that are set-up or in process?  Patient will return home with her mother and follow up with Gena Campoverde: Time frame for Short-Term Goals: Daily      OSCAR Adkins

## 2021-02-21 NOTE — PLAN OF CARE
Problem: Anger Management/Homicidal Ideation:  Goal: Absence of angry outbursts  Description: Absence of angry outbursts  2/21/2021 0109 by Shannan Dubon RN  Outcome: Ongoing  Note: Absence of angry outbursts so far this evening.  2/20/2021 1813 by Wing Sam LPN  Outcome: Ongoing  Note: Patient remained free from angry outbursts at this time during shift. Problem: Altered Mood, Depressive Behavior:  Goal: Participates in care planning  Description: Participates in care planning  2/21/2021 0109 by Shannan Dubon RN  Outcome: Met This Shift  Note: Care plan reviewed with patient. Patient does verbalize understanding of the plan of care and does contribute to goal setting.   2/20/2021 1813 by Wing Sam LPN  Outcome: Ongoing  Note: Patient participates in care planning with staff during shift. Problem: Altered Mood, Psychotic Behavior:  Goal: Able to verbalize reality based thinking  Description: Able to verbalize reality based thinking  2/21/2021 0109 by Shannan Dubon RN  Outcome: Met This Shift  Note: Patient alert and oriented X4.  2/20/2021 1813 by Wing Sam LPN  Outcome: Ongoing  Note: Patient continues to work on verbalization of reality based thinking. Problem: Discharge Planning:  Goal: Discharged to appropriate level of care  Description: Discharged to appropriate level of care  2/21/2021 0109 by Shannan Dubon RN  Outcome: Not Met This Shift  Note: Discharge planning in progress, patient not discharged this evening.     2/20/2021 1813 by Wing Sam LPN  Outcome: Ongoing  Note: No discharge plans noted at this time during shift. Problem: Anxiety:  Goal: Level of anxiety will decrease  Description: Level of anxiety will decrease  2/21/2021 0109 by Shannan Dubon RN  Outcome: Ongoing  Note: Ongoing.   2/20/2021 1813 by Wing Sam LPN  Outcome: Ongoing Note: Patient denies anxiety during shift assessment. Problem: Falls - Risk of:  Goal: Will remain free from falls  Description: Will remain free from falls  2/21/2021 0109 by Sheeba Rod RN  Outcome: Ongoing  Note: Remained free from falls so far this evening.  2/20/2021 1813 by Roosevelt Rodríguez LPN  Outcome: Ongoing  Note: Patient remains free from falls at this time during shift. Goal: Absence of physical injury  Description: Absence of physical injury  2/21/2021 0109 by Sheeba Rod RN  Outcome: Met This Shift  Note: Absence of physical injury so far this evening.  2/20/2021 1813 by Roosevelt Rodríguez LPN  Outcome: Ongoing  Note: Patient remains free from physical injury at this time during shift. Problem: General Medical Problem-Behavioral Health  Goal: Compliance with prescribed medication regimen will improve  Description: Compliance with prescribed medication regimen will improve  2/21/2021 0109 by Sheeba Rod RN  Outcome: Met This Shift  Note: Patient takes medications as ordered. 2/20/2021 1813 by Roosevelt Rodríguez LPN  Outcome: Ongoing  Note: Patient compliant with medication at this time during shift.

## 2021-02-21 NOTE — PLAN OF CARE
Problem: Altered Mood, Depressive Behavior:  Goal: Participates in care planning  Description: Participates in care planning  2/21/2021 0908 by Julio Cesar Baig RN  Note: Patient discussed treatment plan with physician/medical staff, attending group, and compliant with medications. 2/21/2021 0109 by Carolyn Ulloa RN  Outcome: Met This Shift  Note: Care plan reviewed with patient. Patient does verbalize understanding of the plan of care and does contribute to goal setting. Problem: General Medical Problem-Behavioral Health  Goal: Compliance with prescribed medication regimen will improve  Description: Compliance with prescribed medication regimen will improve  2/21/2021 0908 by Julio Cesar Baig RN  Note: Pt took prescribed meds as ordered  2/21/2021 0109 by Carolyn Ulloa RN  Outcome: Met This Shift  Note: Patient takes medications as ordered. Problem: Altered Mood, Psychotic Behavior:  Goal: Able to verbalize reality based thinking  Description: Able to verbalize reality based thinking  2/21/2021 0908 by Julio Cesar Baig RN  Note: Pt orientated at this time   2/21/2021 0109 by Carolyn Ulloa RN  Outcome: Met This Shift  Note: Patient alert and oriented X4. Problem: Falls - Risk of:  Goal: Will remain free from falls  Description: Will remain free from falls  2/21/2021 0908 by Julio Cesar Baig RN  Outcome: Met This Shift  Note: No falls were observed or reported so far this shift, gait steady  when ambulating and wears non-skid slippers socks. Encourage patient to wear shower shoes while in the shower. Remains on fall precautions. 2/21/2021 0109 by Carolyn Ulloa RN  Outcome: Ongoing  Note: Remained free from falls so far this evening. Goal: Absence of physical injury  Description: Absence of physical injury  2/21/2021 0908 by Julio Cesar Baig RN  Outcome: Met This Shift  Note: Maintained in safe and secure environment. 2/21/2021 0109 by Fronie Lanes, RN  Outcome: Met This Shift  Note: Absence of physical injury so far this evening. Problem: Anger Management/Homicidal Ideation:  Goal: Absence of angry outbursts  Description: Absence of angry outbursts  2/21/2021 0908 by Jimy Salinas RN  Outcome: Ongoing  Note: Pt not having any anger outbursts at this time   2/21/2021 0109 by Fronie Lanes, RN  Outcome: Ongoing  Note: Absence of angry outbursts so far this evening. Problem: Anxiety:  Goal: Level of anxiety will decrease  Description: Level of anxiety will decrease  2/21/2021 0908 by Jimy Salinas RN  Note: Patient verbalizes no  acceptance of situations over which she  has no control. Encouraged patient to attend group therapy. 2/21/2021 0109 by Fronie Lanes, RN  Outcome: Ongoing  Note: Ongoing. Problem: Discharge Planning:  Goal: Discharged to appropriate level of care  Description: Discharged to appropriate level of care  2/21/2021 0908 by Jimy Salinas RN  Note: Patient voices emotional  needs before discharge. Patient plans to be discharged to home . Discharge planner working with patient to achieve optimal discharge plans, specific to individual needs. 2/21/2021 0109 by Fronie Lanes, RN  Outcome: Not Met This Shift  Note: Discharge planning in progress, patient not discharged this evening.         Problem: Role Relationship:  Goal: Ability to demonstrate positive changes in social behaviors and relationships will improve  Description: Ability to demonstrate positive changes in social behaviors and relationships will improve  Note: Pt not interacting with peers at this time

## 2021-02-21 NOTE — BH NOTE
Encouraged pt to participate in [employer] group therapy conversation, pt remained resting in bed. No daily goal was obtained.

## 2021-02-21 NOTE — PROGRESS NOTES
Pt visited with mother and told her mother that her \" heart hurts\". Pt not having any shortness of breath. Skin is pink warm and dry. 102/68- 85 - 15 . Pt asking to lay down and go to bed. Pt pointed to epigastric area when asked to point to where it hurts. Pt does not appear in any distress.

## 2021-02-22 VITALS
HEART RATE: 85 BPM | TEMPERATURE: 97 F | SYSTOLIC BLOOD PRESSURE: 94 MMHG | HEIGHT: 63 IN | OXYGEN SATURATION: 95 % | RESPIRATION RATE: 18 BRPM | BODY MASS INDEX: 26.4 KG/M2 | DIASTOLIC BLOOD PRESSURE: 72 MMHG | WEIGHT: 149 LBS

## 2021-02-22 PROCEDURE — 94640 AIRWAY INHALATION TREATMENT: CPT

## 2021-02-22 PROCEDURE — 5130000000 HC BRIDGE APPOINTMENT

## 2021-02-22 PROCEDURE — 6370000000 HC RX 637 (ALT 250 FOR IP): Performed by: PHYSICIAN ASSISTANT

## 2021-02-22 PROCEDURE — 99239 HOSP IP/OBS DSCHRG MGMT >30: CPT | Performed by: PSYCHIATRY & NEUROLOGY

## 2021-02-22 PROCEDURE — 6360000002 HC RX W HCPCS: Performed by: PHYSICIAN ASSISTANT

## 2021-02-22 RX ORDER — QUETIAPINE FUMARATE 400 MG/1
400 TABLET, FILM COATED ORAL NIGHTLY
Qty: 30 TABLET | Refills: 0 | Status: SHIPPED | OUTPATIENT
Start: 2021-02-22 | End: 2021-05-06

## 2021-02-22 RX ORDER — QUETIAPINE FUMARATE 100 MG/1
100 TABLET, FILM COATED ORAL DAILY
Qty: 30 TABLET | Refills: 0 | Status: SHIPPED | OUTPATIENT
Start: 2021-02-23 | End: 2021-05-06

## 2021-02-22 RX ORDER — DIVALPROEX SODIUM 500 MG/1
1000 TABLET, EXTENDED RELEASE ORAL EVERY EVENING
Qty: 60 TABLET | Refills: 0 | Status: SHIPPED | OUTPATIENT
Start: 2021-02-22

## 2021-02-22 RX ADMIN — QUETIAPINE FUMARATE 100 MG: 100 TABLET ORAL at 09:29

## 2021-02-22 RX ADMIN — PANTOPRAZOLE SODIUM 40 MG: 40 TABLET, DELAYED RELEASE ORAL at 09:29

## 2021-02-22 RX ADMIN — BUDESONIDE 1000 MCG: 0.5 INHALANT RESPIRATORY (INHALATION) at 08:58

## 2021-02-22 RX ADMIN — MIDODRINE HYDROCHLORIDE 5 MG: 5 TABLET ORAL at 09:29

## 2021-02-22 RX ADMIN — SERTRALINE 150 MG: 100 TABLET, FILM COATED ORAL at 09:29

## 2021-02-22 RX ADMIN — ALPRAZOLAM 1 MG: 0.5 TABLET ORAL at 09:29

## 2021-02-22 RX ADMIN — POLYETHYLENE GLYCOL 3350 17 G: 17 POWDER, FOR SOLUTION ORAL at 09:31

## 2021-02-22 RX ADMIN — DOCUSATE SODIUM 100 MG: 100 CAPSULE, LIQUID FILLED ORAL at 09:29

## 2021-02-22 RX ADMIN — ALPRAZOLAM 1 MG: 0.5 TABLET ORAL at 15:24

## 2021-02-22 ASSESSMENT — PAIN SCALES - GENERAL: PAINLEVEL_OUTOF10: 0

## 2021-02-22 NOTE — BH NOTE
Behavioral Health   Discharge Note    Pt discharged with followings belongings:   Dentures: None  Vision - Corrective Lenses: None  Hearing Aid: None  Jewelry: None  Body Piercings Removed: N/A  Clothing: Shirt, Pants, Jacket / coat, Footwear, Undergarments (Comment), Socks  Were All Patient Medications Collected?: Not Applicable  Other Valuables: None   Belongings returned to patient. Patient left department with transport staff via wheelchair. Discharged to home with mother. \"An Important Message from [employer] About Your Rights\" form photocopy original from admission and provided to pt at discharge n/a. Patient education on aftercare instructions: yes  Bridge Appointment completed: Reviewed Discharge Instructions with patient. Patient verbalizes understanding and agreement with the discharge plan using the teachback method. Information faxed to follow up provider by staff. Patient verbalize understanding of AVS:  yes.     Status EXAM upon discharge:  Status and Exam  Normal: No  Facial Expression: Flat  Affect: Blunt  Level of Consciousness: Alert  Mood:Normal: No  Mood: Anxious  Motor Activity:Normal: No  Motor Activity: Decreased  Interview Behavior: Cooperative  Preception: [place] to Person, [place] to Time, [place] to Place, [place] to Situation  Attention:Normal: No  Attention: Unable to Concentrate  Thought Processes: Circumstantial  Thought Content:Normal: No  Thought Content: Poverty of Content  Hallucinations: None  Delusions: No  Delusions: (denies)  Memory:Normal: No  Memory: Poor Remote  Insight and Judgment: No  Insight and Judgment: Poor Judgment, Poor Insight  Present Suicidal Ideation: No  Present Homicidal Ideation: No    Tomasz Albert RN

## 2021-02-22 NOTE — GROUP NOTE
Group Therapy Note    Date: 2/22/2021    Group Start Time: 1430  Group End Time: 1500  Group Topic: Healthy Living/Wellness    [facility] 4E    Andrea Sharp RN        Group Therapy Note    Attendees: Patient did not attend wellness group. Patient given copy of safety plan and encouraged to seek out staff for assistance.           Discipline Responsible: Registered Nurse      Signature:  Andrea Sharp RN

## 2021-02-22 NOTE — DISCHARGE INSTR - ACTIVITY
Activity as tolerated Chart review completed for refill of Humira indicates no medication or significant health changes since last pharmacist counseling. No questions for the pharmacist. Medication shipped to #[id number] via [facility] for delivery on 4/1/20.      Mario Leone, PharmD  [facility] [place] [address] [phone]
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
## 2021-02-22 NOTE — TELEPHONE ENCOUNTER
Richard Magdaleno, patient's mother, on HIPAA, asking about the prior auth for budesonide.   States that Barrington Colon will be discharged from [facility] either today or tomorrow and she doesn't have this med yet

## 2021-02-22 NOTE — SUICIDE SAFETY PLAN
SAFETY PLAN    A suicide Safety Plan is a document that supports someone when they are having thoughts of suicide. Warning Signs that indicate a suicidal crisis may be developing: What (situations, thoughts, feelings, body sensations, behaviors, etc.) do you experience that lets you know you are beginning to think about suicide? 1. sad  2.   3. Internal Coping Strategies:  What things can I do (relaxation techniques, hobbies, physical activities, etc.) to take my mind off my problems without contacting another person? 1. Shopping with mom  2. dancing  3. music    People and social settings that provide distraction: Who can I call or where can I go to distract me? 1. Name: mom  Phone:   2. Name:   Phone:    3. Place:             4. Place:     People whom I can ask for help: Who can I call when I need help - for example, friends, family, clergy, someone else? 1. Name: mom                Phone:   2. Name:   Phone:   3. Name:   Phone:     Professionals or [address] [place] [address] I can contact during a crisis: Who can I call for help - for example, my doctor, my psychiatrist, my psychologist, a mental health provider, a suicide hotline? 1. Clinician Name: STAN   Phone: [phone]      Clinician Pager or Emergency Contact #: [phone]    8. Clinician Name:    Phone:       Clinician Pager or Emergency Contact #:     3. Suicide Prevention Lifeline: 5-754-519-TALK (3963)    4. [address] [place] [address] Emergency Services -  for example, [facility] suicide hotline, [facility] Hotline:       Emergency Services Address:       Emergency Services Phone:     Making the environment safe: How can I make my environment (house/apartment/living space) safer? For example, can I remove guns, medications, and other items?   1.   2.

## 2021-02-23 ENCOUNTER — HOSPITAL ENCOUNTER (EMERGENCY)
Age: 31
Discharge: HOME | End: 2021-02-23
Attending: EMERGENCY MEDICINE
Payer: COMMERCIAL

## 2021-02-23 VITALS
RESPIRATION RATE: 18 BRPM | SYSTOLIC BLOOD PRESSURE: 128 MMHG | TEMPERATURE: 97.2 F | DIASTOLIC BLOOD PRESSURE: 75 MMHG | WEIGHT: 149 LBS | BODY MASS INDEX: 26.39 KG/M2 | OXYGEN SATURATION: 97 % | HEART RATE: 94 BPM

## 2021-02-23 DIAGNOSIS — F39 UNSPECIFIED MOOD (AFFECTIVE) DISORDER (HCC): ICD-10-CM

## 2021-02-23 DIAGNOSIS — F81.9 INTELLECTUAL DELAY: ICD-10-CM

## 2021-02-23 DIAGNOSIS — G80.9 CEREBRAL PALSY, UNSPECIFIED TYPE (HCC): Primary | ICD-10-CM

## 2021-02-23 LAB
ACETAMINOPHEN LEVEL: < 5 UG/ML (ref 0–20)
ALBUMIN SERPL-MCNC: 3.8 G/DL (ref 3.5–5.1)
ALP BLD-CCNC: 67 U/L (ref 38–126)
ALT SERPL-CCNC: 8 U/L (ref 11–66)
AMORPHOUS: ABNORMAL
AMPHETAMINE+METHAMPHETAMINE URINE SCREEN: NEGATIVE
ANION GAP SERPL CALCULATED.3IONS-SCNC: 13 MEQ/L (ref 8–16)
AST SERPL-CCNC: 11 U/L (ref 5–40)
BACTERIA: ABNORMAL /HPF
BARBITURATE QUANTITATIVE URINE: NEGATIVE
BASOPHILS # BLD: 0.6 %
BASOPHILS ABSOLUTE: 0 THOU/MM3 (ref 0–0.1)
BENZODIAZEPINE QUANTITATIVE URINE: POSITIVE
BILIRUB SERPL-MCNC: 0.2 MG/DL (ref 0.3–1.2)
BILIRUBIN DIRECT: < 0.2 MG/DL (ref 0–0.3)
BILIRUBIN URINE: NEGATIVE
BLOOD, URINE: ABNORMAL
BUN BLDV-MCNC: 15 MG/DL (ref 7–22)
CALCIUM SERPL-MCNC: 9 MG/DL (ref 8.5–10.5)
CANNABINOID QUANTITATIVE URINE: NEGATIVE
CASTS UA: ABNORMAL /LPF
CHARACTER, URINE: CLEAR
CHLORIDE BLD-SCNC: 101 MEQ/L (ref 98–111)
CO2: 26 MEQ/L (ref 23–33)
COCAINE METABOLITE QUANTITATIVE URINE: NEGATIVE
COLOR: YELLOW
CREAT SERPL-MCNC: 0.8 MG/DL (ref 0.4–1.2)
CRYSTALS, UA: ABNORMAL
EOSINOPHIL # BLD: 0.4 %
EOSINOPHILS ABSOLUTE: 0 THOU/MM3 (ref 0–0.4)
EPITHELIAL CELLS, UA: ABNORMAL /HPF
ERYTHROCYTE [DISTWIDTH] IN BLOOD BY AUTOMATED COUNT: 12 % (ref 11.5–14.5)
ERYTHROCYTE [DISTWIDTH] IN BLOOD BY AUTOMATED COUNT: 43.6 FL (ref 35–45)
ETHYL ALCOHOL, SERUM: < 0.01 %
GFR SERPL CREATININE-BSD FRML MDRD: 83 ML/MIN/1.73M2
GLUCOSE BLD-MCNC: 131 MG/DL (ref 70–108)
GLUCOSE URINE: NEGATIVE MG/DL
HCT VFR BLD CALC: 43.5 % (ref 37–47)
HEMOGLOBIN: 13.4 GM/DL (ref 12–16)
IMMATURE GRANS (ABS): 0.05 THOU/MM3 (ref 0–0.07)
IMMATURE GRANULOCYTES: 1 %
KETONES, URINE: NEGATIVE
LEUKOCYTE ESTERASE, URINE: NEGATIVE
LYMPHOCYTES # BLD: 21.1 %
LYMPHOCYTES ABSOLUTE: 1.1 THOU/MM3 (ref 1–4.8)
MCH RBC QN AUTO: 30.1 PG (ref 26–33)
MCHC RBC AUTO-ENTMCNC: 30.8 GM/DL (ref 32.2–35.5)
MCV RBC AUTO: 97.8 FL (ref 81–99)
MONOCYTES # BLD: 8 %
MONOCYTES ABSOLUTE: 0.4 THOU/MM3 (ref 0.4–1.3)
NITRITE, URINE: NEGATIVE
NUCLEATED RED BLOOD CELLS: 0 /100 WBC
OPIATES, URINE: NEGATIVE
OSMOLALITY CALCULATION: 282 MOSMOL/KG (ref 275–300)
OXYCODONE: NEGATIVE
PH UA: 7.5 (ref 5–9)
PHENCYCLIDINE QUANTITATIVE URINE: NEGATIVE
PLATELET # BLD: 253 THOU/MM3 (ref 130–400)
PMV BLD AUTO: 11.4 FL (ref 9.4–12.4)
POTASSIUM SERPL-SCNC: 4.2 MEQ/L (ref 3.5–5.2)
PREGNANCY, SERUM: NEGATIVE
PROTEIN UA: NEGATIVE
RBC # BLD: 4.45 MILL/MM3 (ref 4.2–5.4)
RBC URINE: ABNORMAL /HPF
SALICYLATE, SERUM: < 0.3 MG/DL (ref 2–10)
SEG NEUTROPHILS: 68.9 %
SEGMENTED NEUTROPHILS ABSOLUTE COUNT: 3.7 THOU/MM3 (ref 1.8–7.7)
SODIUM BLD-SCNC: 140 MEQ/L (ref 135–145)
SPECIFIC GRAVITY, URINE: 1.02 (ref 1–1.03)
TOTAL PROTEIN: 7.7 G/DL (ref 6.1–8)
TSH SERPL DL<=0.05 MIU/L-ACNC: 1.57 UIU/ML (ref 0.4–4.2)
UROBILINOGEN, URINE: 0.2 EU/DL (ref 0–1)
WBC # BLD: 5.3 THOU/MM3 (ref 4.8–10.8)
WBC UA: ABNORMAL /HPF

## 2021-02-23 PROCEDURE — 6360000002 HC RX W HCPCS: Performed by: NURSE PRACTITIONER

## 2021-02-23 PROCEDURE — 80143 DRUG ASSAY ACETAMINOPHEN: CPT

## 2021-02-23 PROCEDURE — 82248 BILIRUBIN DIRECT: CPT

## 2021-02-23 PROCEDURE — 82077 ASSAY SPEC XCP UR&BREATH IA: CPT

## 2021-02-23 PROCEDURE — 80307 DRUG TEST PRSMV CHEM ANLYZR: CPT

## 2021-02-23 PROCEDURE — 81001 URINALYSIS AUTO W/SCOPE: CPT

## 2021-02-23 PROCEDURE — 85025 COMPLETE CBC W/AUTO DIFF WBC: CPT

## 2021-02-23 PROCEDURE — 96372 THER/PROPH/DIAG INJ SC/IM: CPT

## 2021-02-23 PROCEDURE — 84703 CHORIONIC GONADOTROPIN ASSAY: CPT

## 2021-02-23 PROCEDURE — 84443 ASSAY THYROID STIM HORMONE: CPT

## 2021-02-23 PROCEDURE — 36415 COLL VENOUS BLD VENIPUNCTURE: CPT

## 2021-02-23 PROCEDURE — 99283 EMERGENCY DEPT VISIT LOW MDM: CPT

## 2021-02-23 PROCEDURE — 80179 DRUG ASSAY SALICYLATE: CPT

## 2021-02-23 PROCEDURE — 80053 COMPREHEN METABOLIC PANEL: CPT

## 2021-02-23 RX ORDER — LORAZEPAM 2 MG/ML
1 INJECTION INTRAMUSCULAR ONCE
Status: COMPLETED | OUTPATIENT
Start: 2021-02-23 | End: 2021-02-23

## 2021-02-23 RX ORDER — LORAZEPAM 2 MG/ML
1 INJECTION INTRAMUSCULAR ONCE
Status: DISCONTINUED | OUTPATIENT
Start: 2021-02-23 | End: 2021-02-23

## 2021-02-23 RX ORDER — DIPHENHYDRAMINE HYDROCHLORIDE 50 MG/ML
25 INJECTION INTRAMUSCULAR; INTRAVENOUS ONCE
Status: COMPLETED | OUTPATIENT
Start: 2021-02-23 | End: 2021-02-23

## 2021-02-23 RX ADMIN — DIPHENHYDRAMINE HYDROCHLORIDE 25 MG: 50 INJECTION, SOLUTION INTRAMUSCULAR; INTRAVENOUS at 22:41

## 2021-02-23 RX ADMIN — LORAZEPAM 1 MG: 2 INJECTION INTRAMUSCULAR; INTRAVENOUS at 22:43

## 2021-02-23 ASSESSMENT — SLEEP AND FATIGUE QUESTIONNAIRES: AVERAGE NUMBER OF SLEEP HOURS: 8

## 2021-02-24 ENCOUNTER — HOSPITAL ENCOUNTER (EMERGENCY)
Age: 31
Discharge: HOME | End: 2021-02-24
Attending: EMERGENCY MEDICINE
Payer: COMMERCIAL

## 2021-02-24 ENCOUNTER — TELEPHONE (OUTPATIENT)
Dept: PSYCHIATRY | Age: 31
End: 2021-02-24

## 2021-02-24 VITALS
TEMPERATURE: 97.7 F | OXYGEN SATURATION: 98 % | DIASTOLIC BLOOD PRESSURE: 69 MMHG | HEART RATE: 82 BPM | SYSTOLIC BLOOD PRESSURE: 114 MMHG | RESPIRATION RATE: 18 BRPM

## 2021-02-24 DIAGNOSIS — F81.9 INTELLECTUAL DELAY: ICD-10-CM

## 2021-02-24 DIAGNOSIS — F39 UNSPECIFIED MOOD (AFFECTIVE) DISORDER (HCC): Primary | ICD-10-CM

## 2021-02-24 DIAGNOSIS — G80.9 CEREBRAL PALSY, UNSPECIFIED TYPE (HCC): ICD-10-CM

## 2021-02-24 PROCEDURE — 6360000002 HC RX W HCPCS: Performed by: EMERGENCY MEDICINE

## 2021-02-24 PROCEDURE — 99285 EMERGENCY DEPT VISIT HI MDM: CPT

## 2021-02-24 PROCEDURE — 96374 THER/PROPH/DIAG INJ IV PUSH: CPT

## 2021-02-24 RX ORDER — LORAZEPAM 2 MG/ML
2 INJECTION INTRAMUSCULAR ONCE
Status: COMPLETED | OUTPATIENT
Start: 2021-02-24 | End: 2021-02-24

## 2021-02-24 RX ADMIN — LORAZEPAM 2 MG: 2 INJECTION INTRAMUSCULAR; INTRAVENOUS at 21:07

## 2021-02-24 ASSESSMENT — ENCOUNTER SYMPTOMS
COUGH: 0
CHEST TIGHTNESS: 0
ABDOMINAL PAIN: 0
SORE THROAT: 0
EYE REDNESS: 0
STRIDOR: 0
PHOTOPHOBIA: 0
BACK PAIN: 0
DIARRHEA: 0
WHEEZING: 0
VOMITING: 0
SHORTNESS OF BREATH: 0
NAUSEA: 0
CONSTIPATION: 0
EYE ITCHING: 0
RHINORRHEA: 0
EYE PAIN: 0
EYE DISCHARGE: 0
ABDOMINAL DISTENTION: 0

## 2021-02-24 ASSESSMENT — SLEEP AND FATIGUE QUESTIONNAIRES: DO YOU USE A SLEEP AID: NO
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
## 2021-02-24 NOTE — ED TRIAGE NOTES
Pt to the ED via her parents for psych evaluation. Parents states the patient has been combative the last couple days to the point that they have to restrain her physically at home. They states the pt is not taking care of herself anymore and is not eating, drinking, taking her meds, or showering. Upon arrival the pt is calm and cooperative. VSS. Pt placed in ligature resistant room and being monitored by campus police.

## 2021-02-24 NOTE — PROGRESS NOTES
Provisional Diagnosis:   Mood disorder     Risk, Psychosocial and Contextual Factors:  Combative behavior, developmental disability, noncompliance with meds    Current  Treatment: [facility]      Present Suicidal Behavior:      Verbal: denies         Attempt: denies    Access to Weapons:  denies    Current Suicide Risk: Low, Moderate or High:    low    Past Suicidal Behavior:       Verbal: yes    Attempt: denies    Self-Injurious/Self-Mutilation: denies    Traumatic Event Within Past 2 Weeks:   deneis    Current Abuse: denies    Legal: denies    Violence: historical- combative with staff    Protective Factors:  Stable housing, connection to OP, support    Housing:    Resides with mother    CPAP/Oxygen/Ambulation Difficulties: none    Basic Vital Signs Normal?: Check with Patients Nurse prior to Calling Psychiatry    Critical Labs?: Check with Patients Nurse prior to Calling Psychiatry    Clinical Summary:      Patient is a 31year old female who presents to the ED voluntarily with mother. Per RN note, mother informed that pt has been combative the last couple days to the point that they have to restrain her physically at home. They state the pt is not taking care of herself anymore and is not eating, drinking, taking her meds, or showering. Pt is not suicidal or homicidal. Pt states she does not want to go home and wants to be admitted, when asked why she is unable to answer. Pt says she feels safe at home. Pt reports she has not ate or drank anything today, \"I don't want to. \" And she says she is not taking her meds, \"I don't want to. \"     No delusions noted. Hallucinations denied. AOD denied. Pt is cooperative. A/o x1. Pt has blocked, tangential thought. Level of Care Disposition:    Received call from Gary, [address] supervisor, regarding pt. She states that pt's SSA with [employer] is wanting information regarding pt's care.  Gary asks that CRISTIAN be obtained by pt, and asks that when Dr HURTADO is consulted about pt's POC it be asked if pt would benefit from guardianship.     1730: Consulted with medical provider. Provider not doing labs at this time, reports pt is stable and was discharged from ED yesterday. Asks that psychiatry be consulted at this time for POC. 1750: Consulted with Dr. Ibeth Bruno. Patient to be discharged and follow with STAN. Reports that police intervention is necessary for her behaviors. States pt would benefit from guardianship  1830: pt signed CRISTIAN.  Informed of [address]: pt resting in bed
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
## 2021-02-24 NOTE — ED NOTES
This RN discussed DC paperwork with pt and pt stated that she did not want to go home. Pt states she wanted to talk to her mom and to have us call her mother. Pt repeatedly states she does not want to go home.       Lewis Marx RN  02/23/21 8372

## 2021-02-24 NOTE — ED NOTES
Pt cooperated and was medi acted per [facility]. PT asked if she could stay all night and that she would be good and not cause any problems. This RN stated she could not stay all night but she can stay a little longer. Pt laying in bed.       Edwin Kraft, RIKA  02/23/21 6093
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
## 2021-02-25 NOTE — PROGRESS NOTES
NADIR Clinician spoke with Charge Nurse Nathen Gaucher to update that Supervisor Veverly Cheadle is reaching out to Board of DD to assist with additional services and support for patient/family.

## 2021-02-25 NOTE — ED NOTES
Pt refusing to sign paperwork. Pt ripped discharge paperwork  In front of RN.       India Alejandro RN  02/24/21 2027

## 2021-02-25 NOTE — ED NOTES
Patient sitting in chair placing a call. Patient denies any pain or discomfort. Will continue to monitor Patient.       Ifeoma Earl  02/24/21 2017

## 2021-03-21 ENCOUNTER — HOSPITAL ENCOUNTER (EMERGENCY)
Age: 31
Discharge: HOME | End: 2021-03-21
Payer: COMMERCIAL

## 2021-03-21 ENCOUNTER — HOSPITAL ENCOUNTER (EMERGENCY)
Age: 31
Discharge: HOME | End: 2021-03-21
Attending: EMERGENCY MEDICINE
Payer: COMMERCIAL

## 2021-03-21 VITALS
BODY MASS INDEX: 26.58 KG/M2 | HEIGHT: 63 IN | HEART RATE: 104 BPM | WEIGHT: 150 LBS | DIASTOLIC BLOOD PRESSURE: 76 MMHG | RESPIRATION RATE: 18 BRPM | OXYGEN SATURATION: 96 % | TEMPERATURE: 97.7 F | SYSTOLIC BLOOD PRESSURE: 110 MMHG

## 2021-03-21 VITALS
HEART RATE: 99 BPM | OXYGEN SATURATION: 91 % | DIASTOLIC BLOOD PRESSURE: 80 MMHG | SYSTOLIC BLOOD PRESSURE: 111 MMHG | TEMPERATURE: 97.9 F | RESPIRATION RATE: 18 BRPM

## 2021-03-21 DIAGNOSIS — F69 BEHAVIOR CONCERN IN ADULT: Primary | ICD-10-CM

## 2021-03-21 DIAGNOSIS — F69 ADULT BEHAVIOR PROBLEM: Primary | ICD-10-CM

## 2021-03-21 LAB
ACETAMINOPHEN LEVEL: < 5 UG/ML (ref 0–20)
ALBUMIN SERPL-MCNC: 4.1 G/DL (ref 3.5–5.1)
ALP BLD-CCNC: 53 U/L (ref 38–126)
ALT SERPL-CCNC: 6 U/L (ref 11–66)
AMPHETAMINE+METHAMPHETAMINE URINE SCREEN: NEGATIVE
ANION GAP SERPL CALCULATED.3IONS-SCNC: 12 MEQ/L (ref 8–16)
AST SERPL-CCNC: 13 U/L (ref 5–40)
BACTERIA: ABNORMAL /HPF
BARBITURATE QUANTITATIVE URINE: NEGATIVE
BASOPHILS # BLD: 0.6 %
BASOPHILS ABSOLUTE: 0 THOU/MM3 (ref 0–0.1)
BENZODIAZEPINE QUANTITATIVE URINE: POSITIVE
BILIRUB SERPL-MCNC: 0.3 MG/DL (ref 0.3–1.2)
BILIRUBIN DIRECT: < 0.2 MG/DL (ref 0–0.3)
BILIRUBIN URINE: NEGATIVE
BLOOD, URINE: NEGATIVE
BUN BLDV-MCNC: 7 MG/DL (ref 7–22)
CALCIUM SERPL-MCNC: 9.1 MG/DL (ref 8.5–10.5)
CANNABINOID QUANTITATIVE URINE: NEGATIVE
CASTS UA: ABNORMAL /LPF
CHARACTER, URINE: CLEAR
CHLORIDE BLD-SCNC: 101 MEQ/L (ref 98–111)
CO2: 27 MEQ/L (ref 23–33)
COCAINE METABOLITE QUANTITATIVE URINE: NEGATIVE
COLOR: YELLOW
CREAT SERPL-MCNC: 0.7 MG/DL (ref 0.4–1.2)
CRYSTALS, UA: ABNORMAL
EKG ATRIAL RATE: 104 BPM
EKG P AXIS: 54 DEGREES
EKG P-R INTERVAL: 98 MS
EKG Q-T INTERVAL: 368 MS
EKG QRS DURATION: 80 MS
EKG QTC CALCULATION (BAZETT): 483 MS
EKG R AXIS: 85 DEGREES
EKG T AXIS: 31 DEGREES
EKG VENTRICULAR RATE: 104 BPM
EOSINOPHIL # BLD: 2.1 %
EOSINOPHILS ABSOLUTE: 0.1 THOU/MM3 (ref 0–0.4)
EPITHELIAL CELLS, UA: ABNORMAL /HPF
ERYTHROCYTE [DISTWIDTH] IN BLOOD BY AUTOMATED COUNT: 12.4 % (ref 11.5–14.5)
ERYTHROCYTE [DISTWIDTH] IN BLOOD BY AUTOMATED COUNT: 43.5 FL (ref 35–45)
ETHYL ALCOHOL, SERUM: < 0.01 %
GFR SERPL CREATININE-BSD FRML MDRD: > 90 ML/MIN/1.73M2
GLUCOSE BLD-MCNC: 100 MG/DL (ref 70–108)
GLUCOSE URINE: NEGATIVE MG/DL
HCT VFR BLD CALC: 41 % (ref 37–47)
HEMOGLOBIN: 13.2 GM/DL (ref 12–16)
IMMATURE GRANS (ABS): 0.02 THOU/MM3 (ref 0–0.07)
IMMATURE GRANULOCYTES: 0.4 %
KETONES, URINE: ABNORMAL
LEUKOCYTE ESTERASE, URINE: ABNORMAL
LYMPHOCYTES # BLD: 35.4 %
LYMPHOCYTES ABSOLUTE: 1.8 THOU/MM3 (ref 1–4.8)
MCH RBC QN AUTO: 30.6 PG (ref 26–33)
MCHC RBC AUTO-ENTMCNC: 32.2 GM/DL (ref 32.2–35.5)
MCV RBC AUTO: 94.9 FL (ref 81–99)
MISCELLANEOUS 2: ABNORMAL
MONOCYTES # BLD: 7 %
MONOCYTES ABSOLUTE: 0.4 THOU/MM3 (ref 0.4–1.3)
NITRITE, URINE: NEGATIVE
NUCLEATED RED BLOOD CELLS: 0 /100 WBC
OPIATES, URINE: NEGATIVE
OSMOLALITY CALCULATION: 277.5 MOSMOL/KG (ref 275–300)
OXYCODONE: NEGATIVE
PH UA: 5.5 (ref 5–9)
PHENCYCLIDINE QUANTITATIVE URINE: NEGATIVE
PLATELET # BLD: 184 THOU/MM3 (ref 130–400)
PMV BLD AUTO: 11.9 FL (ref 9.4–12.4)
POTASSIUM SERPL-SCNC: 3.2 MEQ/L (ref 3.5–5.2)
PREGNANCY, URINE: NEGATIVE
PROTEIN UA: NEGATIVE
RBC # BLD: 4.32 MILL/MM3 (ref 4.2–5.4)
RBC URINE: ABNORMAL /HPF
RENAL EPITHELIAL, UA: ABNORMAL
SALICYLATE, SERUM: < 0.3 MG/DL (ref 2–10)
SEG NEUTROPHILS: 54.5 %
SEGMENTED NEUTROPHILS ABSOLUTE COUNT: 2.8 THOU/MM3 (ref 1.8–7.7)
SODIUM BLD-SCNC: 140 MEQ/L (ref 135–145)
SPECIFIC GRAVITY, URINE: 1.03 (ref 1–1.03)
TOTAL PROTEIN: 7.3 G/DL (ref 6.1–8)
TROPONIN T: < 0.01 NG/ML
UROBILINOGEN, URINE: 1 EU/DL (ref 0–1)
VALPROIC ACID LEVEL: 98 UG/ML (ref 50–100)
WBC # BLD: 5.1 THOU/MM3 (ref 4.8–10.8)
WBC UA: ABNORMAL /HPF
YEAST: ABNORMAL

## 2021-03-21 PROCEDURE — 87086 URINE CULTURE/COLONY COUNT: CPT

## 2021-03-21 PROCEDURE — 80143 DRUG ASSAY ACETAMINOPHEN: CPT

## 2021-03-21 PROCEDURE — 36415 COLL VENOUS BLD VENIPUNCTURE: CPT

## 2021-03-21 PROCEDURE — 82077 ASSAY SPEC XCP UR&BREATH IA: CPT

## 2021-03-21 PROCEDURE — 93010 ELECTROCARDIOGRAM REPORT: CPT | Performed by: INTERNAL MEDICINE

## 2021-03-21 PROCEDURE — 80179 DRUG ASSAY SALICYLATE: CPT

## 2021-03-21 PROCEDURE — 84484 ASSAY OF TROPONIN QUANT: CPT

## 2021-03-21 PROCEDURE — 99282 EMERGENCY DEPT VISIT SF MDM: CPT

## 2021-03-21 PROCEDURE — 80053 COMPREHEN METABOLIC PANEL: CPT

## 2021-03-21 PROCEDURE — 81025 URINE PREGNANCY TEST: CPT

## 2021-03-21 PROCEDURE — 80307 DRUG TEST PRSMV CHEM ANLYZR: CPT

## 2021-03-21 PROCEDURE — 93005 ELECTROCARDIOGRAM TRACING: CPT | Performed by: PHYSICIAN ASSISTANT

## 2021-03-21 PROCEDURE — 80164 ASSAY DIPROPYLACETIC ACD TOT: CPT

## 2021-03-21 PROCEDURE — 85025 COMPLETE CBC W/AUTO DIFF WBC: CPT

## 2021-03-21 PROCEDURE — 81001 URINALYSIS AUTO W/SCOPE: CPT

## 2021-03-21 PROCEDURE — 82248 BILIRUBIN DIRECT: CPT

## 2021-03-21 ASSESSMENT — ENCOUNTER SYMPTOMS
RHINORRHEA: 0
RHINORRHEA: 0
WHEEZING: 0
DIARRHEA: 0
VOMITING: 0
CHEST TIGHTNESS: 0
SHORTNESS OF BREATH: 0
BACK PAIN: 0
VOMITING: 0
ABDOMINAL PAIN: 0
CONSTIPATION: 0
ABDOMINAL PAIN: 0
SORE THROAT: 0
COUGH: 0
EYE REDNESS: 0
SHORTNESS OF BREATH: 0
NAUSEA: 0
NAUSEA: 0
COUGH: 0

## 2021-03-21 ASSESSMENT — SLEEP AND FATIGUE QUESTIONNAIRES
DIFFICULTY FALLING ASLEEP: YES
DIFFICULTY STAYING ASLEEP: NO

## 2021-03-21 ASSESSMENT — PATIENT HEALTH QUESTIONNAIRE - PHQ9: SUM OF ALL RESPONSES TO PHQ QUESTIONS 1-9: 2

## 2021-03-21 NOTE — PROGRESS NOTES
[address] spoke to [facility] AT [place]. Informed of POC. Informed need for cab.   1716: pt resting   1732: EMC given to provider to lift   1740: Pt knocking on door, requesting to be moved out front stating \"I need an IV. \" encouraged to use call button on wall to contact nurse.
[address], informed about ED concern of pt's return. Candace Bowen encouraged Dr to Dr consult if medical provider feels pt needs to be admitted. 1631: informed Dr Kimberly Chacon of [facility]. Provider voiced understanding and is discharging pt home. 1631: Called parent, Curt Hendricks, informed of POC. Parent upset but understanding. Asked that pt be cabbed home.
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
## 2021-03-21 NOTE — ED NOTES
Spoke with patient's mother on the phone and notified her that patient is being discharge home. Mom states she is not feeling well and requested that we send patient home in a cab.       Akira Chang RN  03/21/21 5890

## 2021-03-22 LAB — URINE CULTURE REFLEX: NORMAL

## 2021-05-06 ENCOUNTER — OFFICE VISIT (OUTPATIENT)
Dept: PULMONOLOGY | Age: 31
End: 2021-05-06
Payer: COMMERCIAL

## 2021-05-06 VITALS
SYSTOLIC BLOOD PRESSURE: 112 MMHG | HEART RATE: 101 BPM | DIASTOLIC BLOOD PRESSURE: 68 MMHG | WEIGHT: 157.2 LBS | HEIGHT: 63 IN | BODY MASS INDEX: 27.85 KG/M2 | OXYGEN SATURATION: 98 % | TEMPERATURE: 98 F

## 2021-05-06 DIAGNOSIS — J45.909 MODERATE ASTHMA WITHOUT COMPLICATION, UNSPECIFIED WHETHER PERSISTENT: ICD-10-CM

## 2021-05-06 DIAGNOSIS — G47.10 HYPERSOMNIA: Primary | ICD-10-CM

## 2021-05-06 DIAGNOSIS — F81.9 INTELLECTUAL DELAY: ICD-10-CM

## 2021-05-06 DIAGNOSIS — F20.9 SCHIZOPHRENIA, UNSPECIFIED TYPE (HCC): ICD-10-CM

## 2021-05-06 DIAGNOSIS — J98.6 PARALYSIS OF DIAPHRAGM: ICD-10-CM

## 2021-05-06 PROCEDURE — 1036F TOBACCO NON-USER: CPT | Performed by: NURSE PRACTITIONER

## 2021-05-06 PROCEDURE — 99215 OFFICE O/P EST HI 40 MIN: CPT | Performed by: NURSE PRACTITIONER

## 2021-05-06 PROCEDURE — G8417 CALC BMI ABV UP PARAM F/U: HCPCS | Performed by: NURSE PRACTITIONER

## 2021-05-06 PROCEDURE — G8427 DOCREV CUR MEDS BY ELIG CLIN: HCPCS | Performed by: NURSE PRACTITIONER

## 2021-05-06 RX ORDER — ZIPRASIDONE HYDROCHLORIDE 20 MG/1
20 CAPSULE ORAL 2 TIMES DAILY WITH MEALS
COMMUNITY

## 2021-05-06 RX ORDER — BUDESONIDE 0.5 MG/2ML
INHALANT ORAL
Qty: 120 ML | Refills: 11 | Status: SHIPPED | OUTPATIENT
Start: 2021-05-06 | End: 2022-05-04 | Stop reason: ALTCHOICE

## 2021-05-06 RX ORDER — OLANZAPINE 5 MG/1
5 TABLET ORAL NIGHTLY
COMMUNITY

## 2021-05-06 RX ORDER — AMOXICILLIN 250 MG/1
250 CAPSULE ORAL 3 TIMES DAILY
COMMUNITY
End: 2021-06-23 | Stop reason: ALTCHOICE

## 2021-05-06 RX ORDER — OLANZAPINE 5 MG/1
5 TABLET, ORALLY DISINTEGRATING ORAL NIGHTLY
COMMUNITY
End: 2021-06-23

## 2021-05-06 ASSESSMENT — ENCOUNTER SYMPTOMS
NAUSEA: 0
WHEEZING: 0
EYES NEGATIVE: 1
DIARRHEA: 0
ABDOMINAL PAIN: 0
CHEST TIGHTNESS: 1
SHORTNESS OF BREATH: 1
COUGH: 0
VOMITING: 0

## 2021-05-06 NOTE — PROGRESS NOTES
[facility]     Patient: Obed Gupta, 27 y.o.   : 1990  2021    Pt of Dr. Eddie Bravo   Patient presents with    Follow-up     Bronchopulmonary dysplasia 17 month pulmonary follow up         HPI  Amira Ellis is here for follow up for bronchopulmonary dysplasia   Has not been seen in pulm clinic since 2019. Since last visit, she was seen in ED for behavioral problem. Per notes was attention seeking ., History of schizophrenia disorder, mental delay, cerebral   palsy. Was being cared for at home by her mother, now in group home. Presents today with chaperone from group home. They state they do not know much about Amira Ellis and are tyring to learn and get her meds/ medical needs adjusted. Currently getting albuterol nebs PRN- Amira Ellis asks for treatments as often as she can. The nursing director asking if anything long acting to give. .  Was on budesonide nebs at home BID  Has percussion vest at home , not sure if she was compliant   C/o SOB, feeling of mucous stuck in airway, unable to clear with cough   Denies trouble swallowing. Goes to [employer]" at facility and which is like her job. Feels tired all the time. Naps daily   Per chaperone on days she does not go to workshop Amira Ellis is always in bed. PSG offered in past, declined by mother. Sleeping habits:  Pt unsure when she goes to bed , states \" I get up early\"     Sleep History:  Pt with history of: excessive daytime sleepiness and/or daytime fatigue  Morning headache:Yes,   Dryness of mouth in the morning:Yes  Hx of snoring:not sure   Witnessed apneas:no  Excessive day time sleepiness:Yes.  See below for Beltrami score  Hypnogogic Hallucinations:NO  Hypnopompic Hallucinations:NO  Symptoms suggestive of Restless leg syndrome:NO  History of Seizures:NO  Sleep Walking:NO  Sleep Talking:NO  Sleep paralysis: NO  Cataplexy: NO      Beltrami Sleepiness Score:   Sitting and every 6 hours as needed for Pain      midodrine (PROAMATINE) 5 MG tablet take 1 tablet by mouth twice a day 180 tablet 3    pravastatin (PRAVACHOL) 20 MG tablet take 1 tablet by mouth once daily at bedtime      ALPRAZolam (XANAX PO) Take 1 mg by mouth 3 times daily       omeprazole (PRILOSEC) 20 MG delayed release capsule Take 20 mg by mouth daily      etonogestrel (NEXPLANON) 68 MG implant 68 mg by Subdermal route once      Sertraline HCl (ZOLOFT PO) Take 150 mg by mouth every morning        No current facility-administered medications for this visit. Vianney FERREIRA   Review of Systems   Constitutional: Positive for fatigue. Negative for activity change, appetite change, chills, fever and unexpected weight change. HENT: Negative. Eyes: Negative. Respiratory: Positive for chest tightness and shortness of breath. Negative for cough and wheezing. Cardiovascular: Negative for chest pain, palpitations and leg swelling. Gastrointestinal: Negative for abdominal pain, diarrhea, nausea and vomiting. Genitourinary: Negative. Musculoskeletal: Negative. Skin: Negative. Neurological: Positive for weakness. Hematological: Does not bruise/bleed easily. Psychiatric/Behavioral: Positive for behavioral problems and dysphoric mood. Negative for sleep disturbance and suicidal ideas. Physical exam   /68 (Site: Left Upper Arm, Position: Sitting)   Pulse 101   Temp 98 °F (36.7 °C)   Ht 5' 3\" (1.6 m)   Wt 157 lb 3.2 oz (71.3 kg)   SpO2 98% Comment: on ra  BMI 27.85 kg/m²      Wt Readings from Last 3 Encounters:   05/06/21 157 lb 3.2 oz (71.3 kg)   03/21/21 150 lb (68 kg)   02/23/21 149 lb (67.6 kg)     Physical Exam  Vitals signs and nursing note reviewed. Constitutional:       General: She is not in acute distress. Appearance: She is overweight. HENT:      Mouth/Throat:      Lips: Pink. Mouth: Mucous membranes are moist.      Pharynx: Oropharynx is clear.  No oropharyngeal Scheduled for sleep study at [facility] to rule out sleep apnea and/or nocturnal hypoxia .  [facility]ility [facility], they will provide Chaperone     Total time spent on encounter was 40 minutes    Will see Faustino Daniel in: 1 year for her asthma    F/u 2 weeks after sleep study at  [employer] [facility]   Electronically signed by KAYLI Doe CNP on 5/6/2021 at 2:30 PM [employer]

## 2021-05-06 NOTE — PATIENT INSTRUCTIONS
Start budesonide nebs twice a day    Continue albuterol nebs Q6H PRN    Patient has a percussion vest at home, please reach out to her mother to see about obtaining this to get her back on therapy twice a day to help her expel mucous    Continue PRN pulse oximeter checks, supplemental O2 as needed to keep SPO2 > 90%     Scheduled for sleep study at [facility] to rule out sleep apnea and/or nocturnal hypoxia

## 2021-05-11 ENCOUNTER — TELEPHONE (OUTPATIENT)
Dept: PULMONOLOGY | Age: 31
End: 2021-05-11

## 2021-05-11 NOTE — TELEPHONE ENCOUNTER
Samirgavino Talbert from [facility] is calling because Pulmicort got denied. Allie Talbert even did a PA on it. She will call Ivan Russell to see what needs covered.

## 2021-06-14 ENCOUNTER — TELEPHONE (OUTPATIENT)
Dept: PULMONOLOGY | Age: 31
End: 2021-06-14

## 2021-06-17 ENCOUNTER — TELEPHONE (OUTPATIENT)
Dept: PULMONOLOGY | Age: 31
End: 2021-06-17

## 2021-06-17 NOTE — TELEPHONE ENCOUNTER
Karina Hilliard called from [facility] asking for sleep study results to be fax to them at [phone]. I did advise Karina Hilliard that our office was unable to leave vm to see if we can get patient in sooner then 8/11. I confirmed the number in file and she understands. I faxed over sleep results.

## 2021-06-18 NOTE — TELEPHONE ENCOUNTER
Pt scheduled to see me for results in in [facility] next week, will discuss results and need for cardiology referral

## 2021-06-23 ENCOUNTER — OFFICE VISIT (OUTPATIENT)
Dept: PULMONOLOGY | Age: 31
End: 2021-06-23
Payer: MEDICAID

## 2021-06-23 VITALS
HEIGHT: 63 IN | HEART RATE: 85 BPM | SYSTOLIC BLOOD PRESSURE: 116 MMHG | OXYGEN SATURATION: 97 % | WEIGHT: 151.8 LBS | BODY MASS INDEX: 26.9 KG/M2 | TEMPERATURE: 97.5 F | DIASTOLIC BLOOD PRESSURE: 72 MMHG

## 2021-06-23 DIAGNOSIS — R00.1 BRADYCARDIA, SINUS: ICD-10-CM

## 2021-06-23 DIAGNOSIS — G47.10 HYPERSOMNIA: Primary | ICD-10-CM

## 2021-06-23 PROCEDURE — 99214 OFFICE O/P EST MOD 30 MIN: CPT | Performed by: NURSE PRACTITIONER

## 2021-06-23 RX ORDER — BENZTROPINE MESYLATE 1 MG/1
1 TABLET ORAL 2 TIMES DAILY
COMMUNITY

## 2021-06-23 ASSESSMENT — ENCOUNTER SYMPTOMS
COUGH: 0
DIARRHEA: 0
VOMITING: 0
NAUSEA: 0
EYES NEGATIVE: 1
ABDOMINAL PAIN: 0
WHEEZING: 0
SHORTNESS OF BREATH: 1

## 2021-06-23 NOTE — PROGRESS NOTES
[facility]    Meghann Lambert, 27 y.o.  [id number]      Pt of [facility] [place]  Nurses Notes   Gentry Chacon is here for a sleep study follow up   Study Results  Initial Study Date -  6/10/2021  AHI -  0.2    TotalEvents - 1  (Apneas  0  Hypopneas 1  Central  0)  LM w/Arousals - 0  Sleep Efficiency - 74.4 % (Total Sleep Time - 388.2 min)  Time with Sats below 88% - 0 min  ESS 17  SAQLI 36  Interval History       Meghann Lambert is a 27 y.o. old female who comes in to review the results of her recent sleep study, to answer questions and to explore options for treatment. she continues to have symptoms of excessive daytime sleepiness, take naps during the day. \" tired all the time\"   Flat affect, per  with her, this is her normal   Is on medications with sedating effects: Geodon 20 mg BID, Cogentin, Depakote  [facility] schizophrenia, mental retardation, cerebral palsy, MI at age 31   Resides in group home. Allergies  Allergies   Allergen Reactions    Haldol [Haloperidol] Other (See Comments)     tarditive dyskinesia     Meds  Current Outpatient Medications   Medication Sig Dispense Refill    benztropine (COGENTIN) 1 MG tablet Take 1 mg by mouth 2 times daily      ziprasidone (GEODON) 20 MG capsule Take 20 mg by mouth 2 times daily (with meals)      OLANZapine (ZYPREXA) 5 MG tablet Take 5 mg by mouth nightly      budesonide (PULMICORT) 0.5 MG/2ML nebulizer suspension inhale contents of 1 vial in nebulizer twice a day.  Rinse mouth after use 120 mL 11    divalproex (DEPAKOTE ER) 500 MG extended release tablet Take 2 tablets by mouth every evening 60 tablet 0    albuterol (PROVENTIL) (2.5 MG/3ML) 0.083% nebulizer solution inhale contents of 1 vial in nebulizer every 6 hours if needed FOR WHEEZING OR SHORTNESS OF BREATH 375 mL 11    docusate sodium (COLACE, DULCOLAX) 100 MG CAPS Take 100 mg by mouth daily      acetaminophen (TYLENOL) 500 MG tablet Take 500 mg by mouth every 6 hours as needed for Pain      midodrine (PROAMATINE) 5 MG tablet take 1 tablet by mouth twice a day 180 tablet 3    pravastatin (PRAVACHOL) 20 MG tablet take 1 tablet by mouth once daily at bedtime      etonogestrel (NEXPLANON) 68 MG implant 68 mg by Subdermal route once      ALPRAZolam (XANAX PO) Take 1 mg by mouth 3 times daily        No current facility-administered medications for this visit. ROS  Review of Systems   Constitutional: Positive for fatigue. Negative for chills and fever. HENT: Negative. Eyes: Negative. Respiratory: Positive for shortness of breath. Negative for cough and wheezing. Cardiovascular: Positive for chest pain (occ midsternal pain  ). Negative for palpitations and leg swelling. Gastrointestinal: Negative for abdominal pain, diarrhea, nausea and vomiting. Genitourinary: Negative. Musculoskeletal: Negative. Skin: Negative. Neurological: Negative. Hematological: Does not bruise/bleed easily. Psychiatric/Behavioral: Negative for sleep disturbance and suicidal ideas. Exam  Vitals -  /72 (Site: Left Upper Arm, Position: Sitting)   Pulse 85   Temp 97.5 °F (36.4 °C)   Ht 5' 3\" (1.6 m)   Wt 151 lb 12.8 oz (68.9 kg)   SpO2 97% Comment: on ra  BMI 26.89 kg/m²    Body mass index is 26.89 kg/m². Oxygen level - Room Air  Physical Exam  Vitals and nursing note reviewed. Exam conducted with a chaperone present. Constitutional:       General: She is not in acute distress. Appearance: Normal appearance. She is well-developed. HENT:      Head: Normocephalic and atraumatic. Mouth/Throat:      Lips: Pink. Mouth: Mucous membranes are moist.      Pharynx: Oropharynx is clear. No oropharyngeal exudate or posterior oropharyngeal erythema. Eyes:      Conjunctiva/sclera: Conjunctivae normal.   Neck:      Vascular: No JVD. Cardiovascular:      Rate and Rhythm: Normal rate and regular rhythm. Heart sounds: No murmur heard. No friction rub. Pulmonary:      Effort: Pulmonary effort is normal. No tachypnea, bradypnea, accessory muscle usage or respiratory distress. Breath sounds: Normal breath sounds. No wheezing, rhonchi or rales. Chest:      Chest wall: No tenderness. Musculoskeletal:      Right lower leg: No edema. Left lower leg: No edema. Skin:     General: Skin is warm and dry. Capillary Refill: Capillary refill takes less than 2 seconds. Nails: There is no clubbing. Neurological:      Mental Status: She is alert and oriented to person, place, and time. Psychiatric:         Attention and Perception: Attention normal.         Mood and Affect: Mood normal. Affect is flat. Speech: Speech is delayed. Behavior: Behavior normal.         Cognition and Memory: Cognition is impaired. Judgment: Judgment normal.        Assessment   Diagnosis Orders   1. Hypersomnia     2. Bradycardia, sinus      during sleep    3. Bronchopulmonary dysplasia        Recommendations    -hypersomnia of unclear etiology but likely medication side effects due to sedating medications  -episodes of bradycardia and sinus tachycardia during sleep with no evidence for sleep disorder breathing or hypoxia . - refer to cardiology.  Already est with Dr Roger Rose, will send report and notes from today for review   -keep pulmonary follow ups as scheduled  -f/u sleep clinic PRN     Total time on encounter: 30 min   Electronically signed by KAYLI Cedillo CNP on 6/23/2021 at 9:28 AM

## 2021-07-07 ENCOUNTER — TELEPHONE (OUTPATIENT)
Dept: CARDIOLOGY CLINIC | Age: 31
End: 2021-07-07

## 2021-07-07 NOTE — TELEPHONE ENCOUNTER
Shannan Rojo from [facility] calling in on this patient that was last seen 9/29/2020 and her next appt is 9/30/2021 with Dr Hallie Malone. She recently had a sleep study done through [facility] in [facility]. Shannan Rojo stated on the study it found she had an abnormal arrhythmia and they are calling to get her a sooner appt with Dr Hallie Malone. She is faxing the reports they received, please call them back to advise when she can be seen, [phone].

## 2021-07-07 NOTE — TELEPHONE ENCOUNTER
Spoke with RN at [facility]--appt sched for 7-12-21 at 9:45am with Dr. Carbajal Payment.  Tony Mims is going to call office back to confirm appt

## 2021-07-08 NOTE — TELEPHONE ENCOUNTER
[address] [facility] called back and advised that they were able to get transportation for the patient and confirmed appt for 07/12/2021 at 945 am

## 2021-07-12 ENCOUNTER — OFFICE VISIT (OUTPATIENT)
Dept: CARDIOLOGY CLINIC | Age: 31
End: 2021-07-12
Payer: MEDICAID

## 2021-07-12 VITALS
HEIGHT: 63 IN | DIASTOLIC BLOOD PRESSURE: 81 MMHG | BODY MASS INDEX: 26.51 KG/M2 | SYSTOLIC BLOOD PRESSURE: 115 MMHG | WEIGHT: 149.6 LBS | HEART RATE: 96 BPM

## 2021-07-12 DIAGNOSIS — I49.3 PVC (PREMATURE VENTRICULAR CONTRACTION): ICD-10-CM

## 2021-07-12 DIAGNOSIS — R00.2 PALPITATION: Primary | ICD-10-CM

## 2021-07-12 PROCEDURE — 99213 OFFICE O/P EST LOW 20 MIN: CPT | Performed by: NUCLEAR MEDICINE

## 2021-07-12 ASSESSMENT — ENCOUNTER SYMPTOMS
COLOR CHANGE: 0
PHOTOPHOBIA: 0
DIARRHEA: 0
CHEST TIGHTNESS: 0
NAUSEA: 0
BACK PAIN: 0
VOMITING: 0
CONSTIPATION: 0
SHORTNESS OF BREATH: 0
ANAL BLEEDING: 0
RECTAL PAIN: 0
ABDOMINAL PAIN: 0
BLOOD IN STOOL: 0
ABDOMINAL DISTENTION: 0

## 2021-07-12 NOTE — PROGRESS NOTES
[address] [facility] [address]  [address]  [address]  [facility] [address]  Dept: [phone]  Dept Fax: [phone]  Loc: [phone]    Visit Date: 7/12/2021    Chevy Chun is a 27 y.o. female who presents todayfor:  Chief Complaint   Patient presents with    Check-Up    Hypertension    Palpitations     Not seen in over 3 years  Does have mental issues  Mentally slow  At NH   Here with a   Looks like was having a sleep study   Found to have PVCs  Does have palpitation  Daily usually   No dizziness  No syncope  No chest pain   No known CAd  Seen tereza before  On midodrine for low BP  On statins as well      HPI:  HPI  Past Medical History:   Diagnosis Date    Anemia     Asthma     [facility]     COPD [facility]chronic obstructive pulmonary disease) [facility]     GERD (gastroesophageal reflux disease)     Heart attack ([facility] [address] 3/2016    Hyperlipidemia     Mental retardation     Schizophrenia simplex ([facility] [address]       Past Surgical History:   Procedure Laterality Date    ABDOMEN SURGERY      ABDOMINAL EXPLORATION SURGERY  05/27/2016    lysis of adhesions 2 times    COLON SURGERY      COLONOSCOPY      COLONOSCOPY N/A 4/25/2019    COLONOSCOPY performed by Venkat Auguste MD at [address] [place] [address]  3/25/16    exploratory, lysis of adhesions, reduction of internal hernia    LAPAROTOMY N/A 1/21/2021    EXPLORATORY LAPAROTOMY, LYSIS OF ADHESIONS performed by Santiago Nunez DO at [facility] History   Problem Relation Age of Onset    Diabetes Mother     High Blood Pressure Mother     Colon Cancer Neg Hx     Breast Cancer Neg Hx     Cancer Neg Hx      Social History     Tobacco Use    Smoking status: Passive Smoke Exposure - Never Smoker    Smokeless tobacco: Never Used   Substance Use Topics    Alcohol use: No      Current Outpatient Medications   Medication Sig Dispense Refill    benztropine (COGENTIN) 1 MG tablet Take 1 mg by mouth 2 times daily      ziprasidone (GEODON) 20 MG capsule Take 20 mg by mouth 2 times daily (with meals)      OLANZapine (ZYPREXA) 5 MG tablet Take 5 mg by mouth nightly      budesonide (PULMICORT) 0.5 MG/2ML nebulizer suspension inhale contents of 1 vial in nebulizer twice a day. Rinse mouth after use 120 mL 11    divalproex (DEPAKOTE ER) 500 MG extended release tablet Take 2 tablets by mouth every evening 60 tablet 0    albuterol (PROVENTIL) (2.5 MG/3ML) 0.083% nebulizer solution inhale contents of 1 vial in nebulizer every 6 hours if needed FOR WHEEZING OR SHORTNESS OF BREATH 375 mL 11    docusate sodium (COLACE, DULCOLAX) 100 MG CAPS Take 100 mg by mouth daily      acetaminophen (TYLENOL) 500 MG tablet Take 500 mg by mouth every 6 hours as needed for Pain      midodrine (PROAMATINE) 5 MG tablet take 1 tablet by mouth twice a day 180 tablet 3    pravastatin (PRAVACHOL) 20 MG tablet take 1 tablet by mouth once daily at bedtime      etonogestrel (NEXPLANON) 68 MG implant 68 mg by Subdermal route once      ALPRAZolam (XANAX PO) Take 1 mg by mouth 3 times daily        No current facility-administered medications for this visit.      Allergies   Allergen Reactions    Haldol [Haloperidol] Other (See Comments)     tarditive dyskinesia     Health Maintenance   Topic Date Due    Hepatitis C screen  Never done    Varicella vaccine (1 of 2 - 2-dose childhood series) Never done    Pneumococcal 0-64 years Vaccine (1 of 2 - PPSV23) Never done    COVID-19 Vaccine (1) Never done    HIV screen  Never done    DTaP/Tdap/Td vaccine (1 - Tdap) Never done    Cervical cancer screen  Never done    Lipid screen  03/27/2020    Flu vaccine (1) 09/01/2021    Hepatitis A vaccine  Aged Out    Hepatitis B vaccine  Aged Out    Hib vaccine  Aged Out    Meningococcal (ACWY) vaccine  Aged Out       Subjective:  Review of Systems   Constitutional: Positive for fatigue. HENT: Negative for ear pain and nosebleeds. Eyes: Negative for photophobia. Respiratory: Negative for chest tightness and shortness of breath. Cardiovascular: Positive for palpitations. Negative for chest pain. Gastrointestinal: Negative for abdominal distention, abdominal pain, anal bleeding, blood in stool, constipation, diarrhea, nausea, rectal pain and vomiting. Endocrine: Negative for polyphagia. Genitourinary: Negative for dysuria and hematuria. Musculoskeletal: Negative for arthralgias, back pain, gait problem, joint swelling, myalgias, neck pain and neck stiffness. Skin: Negative for color change, pallor, rash and wound. Allergic/Immunologic: Negative for food allergies. Neurological: Negative for dizziness and light-headedness. Psychiatric/Behavioral: Negative for confusion, decreased concentration, dysphoric mood and hallucinations. The patient is not nervous/anxious and is not hyperactive. Objective:  Physical Exam  HENT:      Head: Normocephalic. Right Ear: Tympanic membrane normal.      Nose: Nose normal.      Mouth/Throat:      Mouth: Mucous membranes are moist.   Eyes:      Pupils: Pupils are equal, round, and reactive to light. Cardiovascular:      Rate and Rhythm: Normal rate and regular rhythm. Heart sounds: Murmur heard. Pulmonary:      Effort: No respiratory distress. Breath sounds: No stridor. No wheezing, rhonchi or rales. Chest:      Chest wall: No tenderness. Abdominal:      General: There is no distension. Palpations: There is no mass. Tenderness: There is no abdominal tenderness. There is no right CVA tenderness, left CVA tenderness, guarding or rebound. Hernia: No hernia is present. Musculoskeletal:         General: No swelling, tenderness, deformity or signs of injury. Cervical back: Normal range of motion. Right lower leg: No edema. Left lower leg: No edema.    Skin:     Coloration: Skin is not jaundiced or pale. Findings: No bruising, erythema, lesion or rash. Neurological:      Mental Status: She is alert and oriented to person, place, and time. Cranial Nerves: No cranial nerve deficit. Sensory: No sensory deficit. Motor: No weakness. Coordination: Coordination normal.      Gait: Gait normal.      Deep Tendon Reflexes: Reflexes normal.   Psychiatric:         Mood and Affect: Mood normal.         Thought Content: Thought content normal.       /81   Pulse 96   Ht 5' 3\" (1.6 m)   Wt 149 lb 9.6 oz (67.9 kg)   BMI 26.50 kg/m²     Assessment:      Diagnosis Orders   1. Palpitation     2. PVC (premature ventricular contraction)     as above  Low risk for CAD  Frequent PVCs   ECg reviewed     Plan:  No follow-ups on file. Holter, echo   Decide after that   Continue risk factor modification and medical management  . Thank you for allowing me to participate in the care of your patient. Please don't hesitate to contact me regarding any further issues related to the patient care    Orders Placed:  No orders of the defined types were placed in this encounter. Medications Prescribed:  No orders of the defined types were placed in this encounter. Discussed use, benefit, and side effects of prescribed medications. All patient questions answered. Pt voicedunderstanding. Instructed to continue current medications, diet and exercise. Continue risk factor modification and medical management. Patient agreed with treatment plan. Follow up as directed.     Electronically signedby Genny Arias MD on 7/12/2021 at 9:54 AM

## 2021-07-20 ENCOUNTER — HOSPITAL ENCOUNTER (OUTPATIENT)
Dept: NON INVASIVE DIAGNOSTICS | Age: 31
Discharge: HOME | End: 2021-07-20
Payer: MEDICAID

## 2021-07-20 DIAGNOSIS — I49.3 PVC (PREMATURE VENTRICULAR CONTRACTION): ICD-10-CM

## 2021-07-20 DIAGNOSIS — R00.2 PALPITATION: ICD-10-CM

## 2021-07-20 LAB
LV EF: 48 %
LVEF MODALITY: NORMAL

## 2021-07-20 PROCEDURE — 93226 XTRNL ECG REC<48 HR SCAN A/R: CPT

## 2021-07-20 PROCEDURE — 93225 XTRNL ECG REC<48 HRS REC: CPT

## 2021-07-20 PROCEDURE — 93306 TTE W/DOPPLER COMPLETE: CPT

## 2021-07-20 NOTE — PROCEDURES
The skin was prepped and a holter monitor was applied. The patient was instructed on the documentation of symptoms and the purpose of the holter as well as the things to avoid while wearing the holter. The patient was instructed to remove and return the holter on 07/21/2021.   The serial number of the holter that was applied is [id number]

## 2021-07-22 LAB
ACQUISITION DURATION: NORMAL S
AVERAGE HEART RATE: 86 BPM
HOOKUP DATE: NORMAL
HOOKUP TIME: NORMAL
MAX HEART RATE TIME/DATE: NORMAL
MAX HEART RATE: 134 BPM
MIN HEART RATE TIME/DATE: NORMAL
MIN HEART RATE: 57 BPM
NUMBER OF QRS COMPLEXES: NORMAL
NUMBER OF SUPRAVENTRICULAR COUPLETS: 0
NUMBER OF SUPRAVENTRICULAR ECTOPICS: 9
NUMBER OF SUPRAVENTRICULAR ISOLATED BEATS: 9
NUMBER OF VENTRICULAR BIGEMINAL CYCLES: 283
NUMBER OF VENTRICULAR COUPLETS: 18
NUMBER OF VENTRICULAR ECTOPICS: 3151

## 2021-07-23 ENCOUNTER — TELEPHONE (OUTPATIENT)
Dept: CARDIOLOGY CLINIC | Age: 31
End: 2021-07-23

## 2022-01-10 ENCOUNTER — OFFICE VISIT (OUTPATIENT)
Dept: CARDIOLOGY CLINIC | Age: 32
End: 2022-01-10
Payer: MEDICAID

## 2022-01-10 VITALS — DIASTOLIC BLOOD PRESSURE: 78 MMHG | SYSTOLIC BLOOD PRESSURE: 128 MMHG | HEART RATE: 72 BPM

## 2022-01-10 DIAGNOSIS — I49.3 PVC (PREMATURE VENTRICULAR CONTRACTION): Primary | ICD-10-CM

## 2022-01-10 DIAGNOSIS — I95.0 IDIOPATHIC HYPOTENSION: ICD-10-CM

## 2022-01-10 PROCEDURE — 99213 OFFICE O/P EST LOW 20 MIN: CPT | Performed by: NUCLEAR MEDICINE

## 2022-01-10 NOTE — PROGRESS NOTES
[address]  [address]  [address]  [facility] [address]  Dept: [phone]  Dept Fax: [phone]  Loc: [phone]    Visit Date: 1/10/2022    Liseth Juarez is a 27 y.o. female who presents todayfor:  Chief Complaint   Patient presents with    Hypotension    Irregular Heart Beat   seen for PVCs  Doing well  Mentally slow   At a NH  Very limited   Wheel chair and severe limitation   Uses a walker at times  No dizziness  No syncope  Know low BP      HPI:  HPI  Past Medical History:   Diagnosis Date    Anemia     Asthma     [facility] [address]     COPD (chronic obstructive pulmonary disease) [facility] [address] [facility]     GERD (gastroesophageal reflux disease)     Heart attack ([facility] [address] 3/2016    Hyperlipidemia     Mental retardation     Schizophrenia simplex ([facility] [address]       Past Surgical History:   Procedure Laterality Date    ABDOMEN SURGERY      ABDOMINAL EXPLORATION SURGERY  05/27/2016    lysis of adhesions 2 times    COLON SURGERY      COLONOSCOPY      COLONOSCOPY N/A 4/25/2019    COLONOSCOPY performed by Tiffany Cowan MD at [address] [place] [address]  3/25/16    exploratory, lysis of adhesions, reduction of internal hernia    LAPAROTOMY N/A 1/21/2021    EXPLORATORY LAPAROTOMY, LYSIS OF ADHESIONS performed by Tayler Tubbs DO at [facility] History   Problem Relation Age of Onset    Diabetes Mother     High Blood Pressure Mother     Colon Cancer Neg Hx     Breast Cancer Neg Hx     Cancer Neg Hx      Social History     Tobacco Use    Smoking status: Passive Smoke Exposure - Never Smoker    Smokeless tobacco: Never Used   Substance Use Topics    Alcohol use: No      Current Outpatient Medications   Medication Sig Dispense Refill    buPROPion (WELLBUTRIN XL) 150 MG extended release tablet Take 150 mg by mouth daily      omeprazole (PRILOSEC) 40 MG delayed release capsule Take 1 capsule by mouth every morning (before breakfast) 30 capsule 5    benztropine (COGENTIN) 1 MG tablet Take 1 mg by mouth 2 times daily      ziprasidone (GEODON) 20 MG capsule Take 20 mg by mouth 2 times daily (with meals)      OLANZapine (ZYPREXA) 5 MG tablet Take 5 mg by mouth nightly      divalproex (DEPAKOTE ER) 500 MG extended release tablet Take 2 tablets by mouth every evening 60 tablet 0    docusate sodium (COLACE, DULCOLAX) 100 MG CAPS Take 100 mg by mouth daily      midodrine (PROAMATINE) 5 MG tablet take 1 tablet by mouth twice a day 180 tablet 3    pravastatin (PRAVACHOL) 20 MG tablet take 1 tablet by mouth once daily at bedtime      HYDROcodone-acetaminophen (NORCO) 5-325 MG per tablet Take 1 tablet by mouth every 6 hours as needed for Pain.  budesonide (PULMICORT) 0.5 MG/2ML nebulizer suspension inhale contents of 1 vial in nebulizer twice a day. Rinse mouth after use 120 mL 11    albuterol (PROVENTIL) (2.5 MG/3ML) 0.083% nebulizer solution inhale contents of 1 vial in nebulizer every 6 hours if needed FOR WHEEZING OR SHORTNESS OF BREATH 375 mL 11    acetaminophen (TYLENOL) 500 MG tablet Take 500 mg by mouth every 6 hours as needed for Pain      etonogestrel (NEXPLANON) 68 MG implant 68 mg by Subdermal route once (Patient not taking: Reported on 1/10/2022)      ALPRAZolam (XANAX PO) Take 1 mg by mouth 3 times daily        No current facility-administered medications for this visit.      Allergies   Allergen Reactions    Haldol [Haloperidol] Other (See Comments)     tarditive dyskinesia     Health Maintenance   Topic Date Due    Hepatitis C screen  Never done    Varicella vaccine (1 of 2 - 2-dose childhood series) Never done    COVID-19 Vaccine (1) Never done    Pneumococcal 0-64 years Vaccine (1 of 2 - PPSV23) Never done    Depression Screen  Never done    HIV screen  Never done    DTaP/Tdap/Td vaccine (1 - Tdap) Never done    Cervical cancer screen  Never done    Lipid screen 03/27/2020    Flu vaccine (1) Never done    Hepatitis A vaccine  Aged Out    Hepatitis B vaccine  Aged Out    Hib vaccine  Aged Out    Meningococcal (ACWY) vaccine  Aged Out       Subjective:  Review of Systems  General:   No fever, no chills, some fatigue or weight loss  Pulmonary:    some dyspnea, no wheezing  Cardiac:    Denies recent chest pain,   GI:     No nausea or vomiting, no abdominal pain  Neuro:    No dizziness or light headedness,   Musculoskeletal:  No recent active issues  Extremities:   No edema, no obvious claudication       Objective:  Physical Exam  /78   Pulse 72   General:   Well developed, well nourished  Lungs:    Clear to auscultation  Heart:    Normal S1 S2, Slight murmur. no rubs, no gallops  Abdomen:   Soft, non tender, no organomegalies, positive bowel sounds  Extremities:   No edema, no cyanosis, good peripheral pulses  Neurological:   Awake, alert, oriented. Severe limitation   Musculoskelatal:  No obvious deformities    Assessment:      Diagnosis Orders   1. PVC (premature ventricular contraction)     2. Idiopathic hypotension     as above  Cardiac fair for now     Plan:  No follow-ups on file. As above  Will see PRN   Continue risk factor modification and medical management  Thank you for allowing me to participate in the care of your patient. Please don't hesitate to contact me regarding any further issues related to the patient care    Orders Placed:  No orders of the defined types were placed in this encounter. Medications Prescribed:  No orders of the defined types were placed in this encounter. Discussed use, benefit, and side effects of prescribed medications. All patient questions answered. Pt voicedunderstanding. Instructed to continue current medications, diet and exercise. Continue risk factor modification and medical management. Patient agreed with treatment plan. Follow up as directed.     Electronically signedby Kusum Kirkland MD on 1/10/2022 at 8:48 AM

## 2022-02-21 PROBLEM — S82.109A CLOSED FRACTURE OF UPPER END OF TIBIA: Status: ACTIVE | Noted: 2022-02-21

## 2022-05-04 ENCOUNTER — OFFICE VISIT (OUTPATIENT)
Dept: PULMONOLOGY | Age: 32
End: 2022-05-04
Payer: MEDICAID

## 2022-05-04 ENCOUNTER — TELEPHONE (OUTPATIENT)
Dept: PULMONOLOGY | Age: 32
End: 2022-05-04

## 2022-05-04 VITALS
SYSTOLIC BLOOD PRESSURE: 120 MMHG | DIASTOLIC BLOOD PRESSURE: 68 MMHG | TEMPERATURE: 97.7 F | HEART RATE: 94 BPM | OXYGEN SATURATION: 98 % | WEIGHT: 148.8 LBS | HEIGHT: 62 IN | BODY MASS INDEX: 27.38 KG/M2

## 2022-05-04 DIAGNOSIS — G47.10 HYPERSOMNIA: ICD-10-CM

## 2022-05-04 DIAGNOSIS — J45.909 MODERATE ASTHMA WITHOUT COMPLICATION, UNSPECIFIED WHETHER PERSISTENT: ICD-10-CM

## 2022-05-04 DIAGNOSIS — K22.2 ESOPHAGEAL STRICTURE: ICD-10-CM

## 2022-05-04 DIAGNOSIS — R13.10 DYSPHAGIA, UNSPECIFIED TYPE: ICD-10-CM

## 2022-05-04 DIAGNOSIS — J98.6 PARALYSIS OF DIAPHRAGM: ICD-10-CM

## 2022-05-04 DIAGNOSIS — F20.9 SCHIZOPHRENIA, UNSPECIFIED TYPE (HCC): ICD-10-CM

## 2022-05-04 DIAGNOSIS — F81.9 INTELLECTUAL DELAY: ICD-10-CM

## 2022-05-04 PROCEDURE — 99214 OFFICE O/P EST MOD 30 MIN: CPT | Performed by: NURSE PRACTITIONER

## 2022-05-04 ASSESSMENT — ENCOUNTER SYMPTOMS
ABDOMINAL PAIN: 0
DIARRHEA: 0
EYES NEGATIVE: 1
SHORTNESS OF BREATH: 1
WHEEZING: 1
NAUSEA: 0
COUGH: 0
VOMITING: 0

## 2022-05-04 NOTE — TELEPHONE ENCOUNTER
Patient is in office today, [facility] [employer] [facility] needs updated, Niki Lomax is from a group home in [facility] and unable to sign for herself. Left message on mom's voicemail to stop in the [address] [facility] office to sign.

## 2022-05-04 NOTE — PROGRESS NOTES
[facility]     Patient: Pasquale Valdes, 34 y.o.   : 1990  2022    Pt of      Subjective     Chief Complaint   Patient presents with    Follow-up     pulm 1 yr f/u        HPI  Jonathan Ladd is here for 1 year follow up for Asthma   Accompanied by transport/chaperone,  Her transporter is familiar with her and is able to help give some HPI  Resides at group home, seems pretty happy and appears well cared for   Has bronchopulmonary dysplasia with paralysis of Diaphragm, MRDD, cerebral palsy   In lab PSG  completed 6/10/2021 neg for [place]N . 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 Most recent was last week. Current Inhalers:  Pulmicort Flexhaler BID, PRN albuterol sulfate nebs   Previous Inhalers:  Pulmicort nebs  Non compliant with vest percussion therapy, DON of Group home  asking this to be discontinued     Reports some occ. SOB/ wheezing \" I tell the nurse and they give me inhaler\" she reports benefit from inhalers     Any recent exacerbations that have required oral atb or steroids No  Any new medical issues since last visit : No    Last spirometry: Attempted in 2016.  Pt unable to perform mouth seal and follow directions to get results     Patient is not on home oxygen therapy:   Last 6 min walk: 2016       SOCIAL HISTORY:  Social History     Tobacco Use    Smoking status: Passive Smoke Exposure - Never Smoker    Smokeless tobacco: Never Used   Vaping Use    Vaping Use: Never used   Substance Use Topics    Alcohol use: No    Drug use: No         CURRENT MEDICATIONS:  Current Outpatient Medications   Medication Sig Dispense Refill    budesonide (PULMICORT FLEXHALER) 180 MCG/ACT AEPB inhaler Inhale 2 puffs into the lungs 2 times daily      buPROPion (WELLBUTRIN XL) 150 MG extended release tablet Take 150 mg by mouth daily      HYDROcodone-acetaminophen (NORCO) 5-325 MG per tablet Take 1 tablet by mouth every 6 hours as needed for Pain.  omeprazole (PRILOSEC) 40 MG delayed release capsule Take 1 capsule by mouth every morning (before breakfast) 30 capsule 5    benztropine (COGENTIN) 1 MG tablet Take 1 mg by mouth 2 times daily      ziprasidone (GEODON) 20 MG capsule Take 20 mg by mouth 2 times daily (with meals)      OLANZapine (ZYPREXA) 5 MG tablet Take 5 mg by mouth nightly      divalproex (DEPAKOTE ER) 500 MG extended release tablet Take 2 tablets by mouth every evening 60 tablet 0    albuterol (PROVENTIL) (2.5 MG/3ML) 0.083% nebulizer solution inhale contents of 1 vial in nebulizer every 6 hours if needed FOR WHEEZING OR SHORTNESS OF BREATH 375 mL 11    docusate sodium (COLACE, DULCOLAX) 100 MG CAPS Take 100 mg by mouth daily      acetaminophen (TYLENOL) 500 MG tablet Take 500 mg by mouth every 6 hours as needed for Pain      midodrine (PROAMATINE) 5 MG tablet take 1 tablet by mouth twice a day 180 tablet 3    pravastatin (PRAVACHOL) 20 MG tablet take 1 tablet by mouth once daily at bedtime      etonogestrel (NEXPLANON) 68 MG implant 68 mg by Subdermal route once       ALPRAZolam (XANAX PO) Take 1 mg by mouth 3 times daily        No current facility-administered medications for this visit. Kyler FERREIRA   Review of Systems   Constitutional: Positive for fatigue. Negative for activity change, appetite change, chills, fever and unexpected weight change. HENT: Negative. Eyes: Negative. Respiratory: Positive for shortness of breath and wheezing. Negative for cough. Cardiovascular: Negative for chest pain, palpitations and leg swelling. Gastrointestinal: Negative for abdominal pain, diarrhea, nausea and vomiting. Genitourinary: Negative. Musculoskeletal: Negative. Skin: Negative. Neurological: Negative. Hematological: Negative. Psychiatric/Behavioral: Positive for behavioral problems. Negative for sleep disturbance. Physical exam   /68 (Site: Left Upper Arm, Position: Sitting, Cuff Size: Medium Adult)   Pulse 94   Temp 97.7 °F (36.5 °C) (Oral)   Ht 5' 2\" (1.575 m)   Wt 148 lb 12.8 oz (67.5 kg)   SpO2 98%   BMI 27.22 kg/m²      Wt Readings from Last 3 Encounters:   05/04/22 148 lb 12.8 oz (67.5 kg)   04/14/22 148 lb 8 oz (67.4 kg)   02/21/22 145 lb (65.8 kg)     Physical Exam  Vitals and nursing note reviewed. Constitutional:       General: She is not in acute distress. Appearance: She is well-developed and overweight. HENT:      Mouth/Throat:      Lips: Pink. Mouth: Mucous membranes are moist.      Pharynx: Oropharynx is clear. No oropharyngeal exudate or posterior oropharyngeal erythema. Eyes:      Conjunctiva/sclera: Conjunctivae normal.   Neck:      Vascular: No JVD. Cardiovascular:      Rate and Rhythm: Normal rate and regular rhythm. Heart sounds: No murmur heard. No friction rub. Pulmonary:      Effort: Pulmonary effort is normal. No accessory muscle usage or respiratory distress. Breath sounds: Normal breath sounds. No wheezing, rhonchi or rales. Chest:      Chest wall: No tenderness. Musculoskeletal:      Right lower leg: No edema. Left lower leg: No edema. Skin:     General: Skin is warm and dry. Capillary Refill: Capillary refill takes less than 2 seconds. Nails: There is no clubbing. Neurological:      Mental Status: She is alert and oriented to person, place, and time. Psychiatric:         Mood and Affect: Mood normal.         Speech: Speech is delayed and slurred. Behavior: Behavior is slowed. Thought Content: Thought content normal.         Cognition and Memory: Cognition is impaired.           Test results   Lung Nodule Screening     [] Qualifies    [x]Does not qualify   [] Declined    [] Completed     Assessment      Diagnosis Orders   1. Bronchopulmonary dysplasia     2. Moderate asthma without complication, unspecified whether persistent     3. Paralysis of diaphragm     4. Intellectual delay     5. Hypersomnia     6. Schizophrenia, unspecified type ([facility] [address]     7. Dysphagia, unspecified type     8. Esophageal stricture         Plan    -asthma appears to be under good control : continue Pulmicort Flexhaler at current dosing   -PRN albuterol sulfate nebs   -educated on dysphagia and purpose of the percussion vest to help clear secretions. Pt and chaperone verbalize understanding and still request therapy be discontinued. Carlie Ramsey has not been tolerating therapy and refusing it.      Will see Daphene Nails in: 1 year  billing based on time: total time on encounter 30 min   Electronically signed by KAYLI Gooden CNP on 5/4/2022 at 11:57 AM

## 2023-02-08 NOTE — PROGRESS NOTES
Magaly Crowder, [address] [place] [address]. [address]  [place] [address]  [phone]  Post Procedure Evaluation in Office    Pt Name: Renato Lindquist  Date of Birth 1990   Today's Date: 1/29/2021  Medical Record Number: [id number]  Referring Provider: No ref. provider found  Primary Care Provider: KAYLI Rod CNP  Chief Complaint   Patient presents with   Letty Florence Post-Op Check     s/p Exploratory laparotomy, adhesiolysis-1/21/2021     ASSESSMENT       Diagnosis Orders   1. Partial small bowel obstruction (HCC)      S/p ex lap release of obstruction 1/21/21   Incision is clean, dry and intact or healing as expected   PLANS      Pathology reviewed with the patient who understands. All questions were answered. Patient Instructions   No lifting, pushing or pulling more than 30 lbs for 2 weeks, then 50 lbs for 2 weeks, then 80 lbs for 2 weeks. No restrictions after 6 weeks. Every other staple removed. Follow up: Return in about 1 week (around 2/5/2021). Eleanor Mcdonald is seen today for post-op follow-up. She is S/P release of SBO 1/21/21. She is tolerating a regular diet, having regular bowel movements. Symptoms and activity have gradually improved compared to preoperative. The surgical site is clean and has no drainage. Pain is controlled without any narcotic pain medications. She has compliant with postoperative instructions. Past Medical History   has a past medical history of Anemia, Asthma, Cerebral palsy ([address] COPD (chronic obstructive pulmonary disease) ([address] GERD (gastroesophageal reflux disease), Heart attack ([address] Hyperlipidemia, Mental retardation, and Schizophrenia simplex ([address]   Past Surgical History has a past surgical history that includes Gastric fundoplication; laparotomy (3/25/16); Colon surgery; Abdominal exploration surgery (05/27/2016); Eye surgery (1996); Colonoscopy; Colonoscopy (N/A, 4/25/2019); Abdomen surgery; and laparotomy (N/A, 1/21/2021). Medications  has a current medication list which includes the following prescription(s): hydrocodone-acetaminophen, docusate, omeprazole, acetaminophen, midodrine, metoprolol tartrate, pravastatin, benzonatate, albuterol, budesonide, quetiapine, polyethylene glycol, etonogestrel, sertraline hcl, and alprazolam.  Allergies  has No Known Allergies. Social History   reports that she is a non-smoker but has been exposed to tobacco smoke. She has never used smokeless tobacco. She reports that she does not drink alcohol or use drugs. Health Screening Exams  Health Maintenance   Topic Date Due    Hepatitis C screen  1990    Varicella vaccine (1 of 2 - 2-dose childhood series) 03/28/1990    Pneumococcal 0-64 years Vaccine (1 of 1 - PPSV23) 03/28/1995    HIV screen  03/28/2004    DTaP/Tdap/Td vaccine (1 - Tdap) 03/28/2008    Cervical cancer screen  03/28/2010    Lipid screen  03/27/2020    Flu vaccine (1) 09/01/2020    Hepatitis A vaccine  Aged Out    Hepatitis B vaccine  Aged Out    Hib vaccine  Aged Out    Meningococcal (ACWY) vaccine  Aged Out     Review of Systems  History obtained from the patient. Constitutional: Denies any fever, chills, fatigue. Wound: Denies any rash, skin color changes or wound problems. Resp: Denies any cough, shortness of breath. CV: Denies any chest pain, orthopnea or syncope. GI: Denies any nausea, vomiting, blood in the stool, constipation or diarrhea. Positive for incisional discomfort only. : Denies any hematuria, hesitancy or dysuria.       OBJECTIVE VITALS:  height is 5' 5\" (1.651 m) and weight is 154 lb (69.9 kg). Her temporal temperature is 97.5 °F (36.4 °C). Her blood pressure is 118/72 and her pulse is 102. Her respiration is 14 and oxygen saturation is 97%. Pain Score:   0 - No pain  CONSTITUTIONAL: Alert and oriented times 3, no acute distress and cooperative to examination. SKIN: Skin color, texture, turgor normal. No rashes or lesions. INCISION: wound margins intact and healing well. No signs of infection. No drainage. LUNGS: Chest expands equally bilaterally upon respiration, no accessory muscle used. Ausculation reveals no wheezes, rales or rhonchi. CARDIOVASCULAR: Heart sounds are normal.  Regular rate and rhythm without murmur, gallop or rub. Normal S1 and S2.  ABDOMEN: Soft, nondistended, no masses or organomegaly. Bowel sounds normal. laparotomy scar present with prominent healing ridge. No sign of recurrence, hematoma or seroma. Tenderness to palpation incisional only. Every other staple removed without incident. Ursula Lundberg NEUROLOGIC: No sensory or motor nerve irritation       Thank you for the interesting evaluation. Further recommendations as listed above.        Electronically signed by Byron Mujica DO on 1/29/2021 at 1:32 PM normal...

## 2023-05-17 ENCOUNTER — OFFICE VISIT (OUTPATIENT)
Dept: PULMONOLOGY | Age: 33
End: 2023-05-17
Payer: MEDICAID

## 2023-05-17 VITALS
HEIGHT: 62 IN | HEART RATE: 87 BPM | BODY MASS INDEX: 28.08 KG/M2 | DIASTOLIC BLOOD PRESSURE: 78 MMHG | TEMPERATURE: 97.2 F | WEIGHT: 152.6 LBS | SYSTOLIC BLOOD PRESSURE: 126 MMHG | OXYGEN SATURATION: 97 %

## 2023-05-17 DIAGNOSIS — F81.9 INTELLECTUAL DELAY: ICD-10-CM

## 2023-05-17 DIAGNOSIS — J98.6 PARALYSIS OF DIAPHRAGM: ICD-10-CM

## 2023-05-17 DIAGNOSIS — J45.909 MODERATE ASTHMA WITHOUT COMPLICATION, UNSPECIFIED WHETHER PERSISTENT: ICD-10-CM

## 2023-05-17 PROCEDURE — 99213 OFFICE O/P EST LOW 20 MIN: CPT | Performed by: NURSE PRACTITIONER

## 2023-05-17 RX ORDER — METFORMIN HYDROCHLORIDE 750 MG/1
TABLET, EXTENDED RELEASE ORAL
COMMUNITY

## 2023-05-17 RX ORDER — IBUPROFEN 400 MG/1
400 TABLET ORAL EVERY 6 HOURS PRN
COMMUNITY

## 2023-05-17 RX ORDER — BISACODYL 5 MG/1
5 TABLET, DELAYED RELEASE ORAL DAILY PRN
COMMUNITY

## 2023-05-17 ASSESSMENT — ENCOUNTER SYMPTOMS
COUGH: 0
VOMITING: 0
CHEST TIGHTNESS: 0
SHORTNESS OF BREATH: 0
EYES NEGATIVE: 1
APNEA: 0
WHEEZING: 0
DIARRHEA: 0
ABDOMINAL PAIN: 0
NAUSEA: 0

## 2024-05-15 ENCOUNTER — OFFICE VISIT (OUTPATIENT)
Dept: PULMONOLOGY | Age: 34
End: 2024-05-15
Payer: MEDICAID

## 2024-05-15 VITALS
HEIGHT: 62 IN | DIASTOLIC BLOOD PRESSURE: 74 MMHG | BODY MASS INDEX: 26.61 KG/M2 | OXYGEN SATURATION: 96 % | TEMPERATURE: 97.4 F | WEIGHT: 144.6 LBS | SYSTOLIC BLOOD PRESSURE: 120 MMHG | HEART RATE: 88 BPM

## 2024-05-15 DIAGNOSIS — J30.2 SEASONAL ALLERGIES: ICD-10-CM

## 2024-05-15 DIAGNOSIS — J45.30 MILD PERSISTENT ASTHMA WITHOUT COMPLICATION: Primary | ICD-10-CM

## 2024-05-15 PROBLEM — F32.9 MAJOR DEPRESSIVE DISORDER, SINGLE EPISODE, UNSPECIFIED: Status: ACTIVE | Noted: 2024-05-15

## 2024-05-15 PROBLEM — F79 MENTAL DISABILITY: Status: ACTIVE | Noted: 2021-02-02

## 2024-05-15 PROBLEM — F20.9 SCHIZOPHRENIA (HCC): Status: ACTIVE | Noted: 2023-03-09

## 2024-05-15 PROCEDURE — 99214 OFFICE O/P EST MOD 30 MIN: CPT | Performed by: NURSE PRACTITIONER

## 2024-05-15 RX ORDER — GUAIFENESIN 600 MG/1
600 TABLET, EXTENDED RELEASE ORAL 2 TIMES DAILY PRN
Qty: 30 TABLET | Refills: 5 | Status: SHIPPED | OUTPATIENT
Start: 2024-05-15

## 2024-05-15 RX ORDER — POLYETHYLENE GLYCOL 3350 17 G/17G
17 POWDER, FOR SOLUTION ORAL DAILY
COMMUNITY

## 2024-05-15 RX ORDER — MIRTAZAPINE 15 MG/1
15 TABLET, FILM COATED ORAL NIGHTLY
COMMUNITY

## 2024-05-15 ASSESSMENT — ENCOUNTER SYMPTOMS: COUGH: 1

## 2024-05-15 NOTE — PROGRESS NOTES
750 MG extended release tablet metformin  mg tablet,extended release 24 hr   Take 1 tablet every day by oral route.      pravastatin (PRAVACHOL) 20 MG tablet take 1 tablet by mouth once daily at bedtime      etonogestrel (NEXPLANON) 68 MG implant 68 mg by Subdermal route once (Patient not taking: Reported on 5/15/2024)       No current facility-administered medications for this visit.           ROS   Review of Systems   Constitutional:  Positive for fatigue.   HENT:  Positive for postnasal drip and sneezing.    Respiratory:  Positive for cough.    Allergic/Immunologic: Positive for environmental allergies.   All other systems reviewed and are negative.      Physical exam   /74 (Site: Right Upper Arm, Position: Sitting, Cuff Size: Medium Adult)   Pulse 88   Temp 97.4 °F (36.3 °C) (Skin)   Ht 1.575 m (5' 2\")   Wt 65.6 kg (144 lb 9.6 oz)   SpO2 96%   BMI 26.45 kg/m²       Wt Readings from Last 3 Encounters:   05/15/24 65.6 kg (144 lb 9.6 oz)   05/17/23 69.2 kg (152 lb 9.6 oz)   08/19/22 70.8 kg (156 lb)       Physical Exam  Vitals and nursing note reviewed.   Constitutional:       General: She is not in acute distress.     Appearance: Normal appearance. She is well-developed and overweight.   HENT:      Head: Normocephalic and atraumatic.      Mouth/Throat:      Lips: Pink.      Mouth: Mucous membranes are moist.      Pharynx: Oropharynx is clear. No oropharyngeal exudate or posterior oropharyngeal erythema.   Eyes:      Conjunctiva/sclera: Conjunctivae normal.   Neck:      Vascular: No JVD.   Cardiovascular:      Rate and Rhythm: Normal rate and regular rhythm.      Heart sounds: No murmur heard.     No friction rub.   Pulmonary:      Effort: Pulmonary effort is normal. No tachypnea, bradypnea, accessory muscle usage or respiratory distress.      Breath sounds: Normal breath sounds. No wheezing, rhonchi or rales.   Chest:      Chest wall: No tenderness.   Musculoskeletal:      Right lower leg: No

## 2025-06-10 ENCOUNTER — OFFICE VISIT (OUTPATIENT)
Dept: PULMONOLOGY | Age: 35
End: 2025-06-10
Payer: COMMERCIAL

## 2025-06-10 VITALS
HEIGHT: 64 IN | BODY MASS INDEX: 29.26 KG/M2 | HEART RATE: 95 BPM | DIASTOLIC BLOOD PRESSURE: 70 MMHG | SYSTOLIC BLOOD PRESSURE: 104 MMHG | WEIGHT: 171.4 LBS | OXYGEN SATURATION: 96 % | TEMPERATURE: 97.7 F

## 2025-06-10 DIAGNOSIS — J45.909 MODERATE ASTHMA WITHOUT COMPLICATION, UNSPECIFIED WHETHER PERSISTENT: Primary | ICD-10-CM

## 2025-06-10 DIAGNOSIS — J30.2 SEASONAL ALLERGIES: ICD-10-CM

## 2025-06-10 PROCEDURE — 1036F TOBACCO NON-USER: CPT

## 2025-06-10 PROCEDURE — G8419 CALC BMI OUT NRM PARAM NOF/U: HCPCS

## 2025-06-10 PROCEDURE — G8427 DOCREV CUR MEDS BY ELIG CLIN: HCPCS

## 2025-06-10 PROCEDURE — 99214 OFFICE O/P EST MOD 30 MIN: CPT

## 2025-06-10 RX ORDER — ALBUTEROL SULFATE 0.83 MG/ML
SOLUTION RESPIRATORY (INHALATION)
Qty: 375 ML | Refills: 11 | Status: SHIPPED | OUTPATIENT
Start: 2025-06-10

## 2025-06-10 RX ORDER — FLUTICASONE PROPIONATE AND SALMETEROL XINAFOATE 230; 21 UG/1; UG/1
2 AEROSOL, METERED RESPIRATORY (INHALATION) 2 TIMES DAILY
Qty: 1 EACH | Refills: 11 | Status: SHIPPED | OUTPATIENT
Start: 2025-06-10

## 2025-06-10 ASSESSMENT — ENCOUNTER SYMPTOMS
SHORTNESS OF BREATH: 1
CHEST TIGHTNESS: 0
RHINORRHEA: 0
COUGH: 0
WHEEZING: 1
SORE THROAT: 0

## 2025-06-10 NOTE — PROGRESS NOTES
Chronic medical conditions: Intellectual delay, hypersomnia, schizophrenia, dysphagia, esophageal stricture, HLD, GERD, cerebral palsy    (Please note that portions of this note may have been with a voice recognition program.  Efforts were made to edit the dictation but occasionally words are mis-transcribed )     Will see Araceil Jacques in: 6 months.    billing based on medical decision making   Electronically signed by Gayatri Guillen PA-C on 6/10/2025 at 8:38 AM

## (undated) DEVICE — GOWN,SIRUS,NONRNF,SETINSLV,XL,20/CS: Brand: MEDLINE

## (undated) DEVICE — SUTURE VCRL SZ 2-0 L27IN ABSRB UD L26MM SH 1/2 CIR J417H

## (undated) DEVICE — COVER ARMBRD W13XL28.5IN IMPERV BLU FOR OP RM

## (undated) DEVICE — BLANKET THER AD W24XL60IN FAB COVERING SUP SFT ULT THN LTWT

## (undated) DEVICE — GLOVE SURG SZ 65 THK91MIL LTX FREE SYN POLYISOPRENE

## (undated) DEVICE — PAD,ABDOMINAL,5"X9",ST,LF,25/BX: Brand: MEDLINE INDUSTRIES, INC.

## (undated) DEVICE — CATHETER ETER IV 22GA L1IN POLYUR STR RADPQ INTROCAN SFTY

## (undated) DEVICE — SUTURE VCRL SZ 0 L18IN ABSRB VLT SUTUPAK PRECUT W/O NDL J106T

## (undated) DEVICE — SUTURE PERMAHAND SZ 3-0 L18IN NONABSORBABLE BLK L26MM SH C013D

## (undated) DEVICE — ENDO KIT: Brand: MEDLINE INDUSTRIES, INC.

## (undated) DEVICE — GOWN,SIRUS,NON REINFRCD,LARGE,SET IN SL: Brand: MEDLINE

## (undated) DEVICE — SET LNR RED GRN W/ BASE CLEANASCOPE

## (undated) DEVICE — SPONGE GZ W4XL4IN COT 12 PLY TYP VII WVN C FLD DSGN

## (undated) DEVICE — SUTURE PDS II SZ 0 L60IN ABSRB VLT L48MM CTX 1/2 CIR Z990G

## (undated) DEVICE — SNARE POLYP SM W13MMXL240CM SHTH DIA2.4MM OVL FLX DISP

## (undated) DEVICE — SET ADMIN 25ML L117IN PMP MOD CK VLV RLER CLMP 2 SMRTSITE

## (undated) DEVICE — TOTAL TRAY, DB, 100% SILI FOLEY, 16FR 10: Brand: MEDLINE

## (undated) DEVICE — TUBING IV STOPCOCK 48 CM 3 W

## (undated) DEVICE — SUTURE VCRL + SZ 3-0 L27IN ABSRB UD L26MM SH 1/2 CIR VCP416H

## (undated) DEVICE — GLOVE SURG SZ 75 L12IN FNGR THK94MIL TRNSLUC YEL LTX

## (undated) DEVICE — GOWN,AURORA,NON-REINFORCED,2XL: Brand: MEDLINE

## (undated) DEVICE — TOWEL,OR,DSP,ST,WHITE,DLX,XR,4/PK,20PK/C: Brand: MEDLINE

## (undated) DEVICE — BREAST HERNIA PACK: Brand: MEDLINE INDUSTRIES, INC.

## (undated) DEVICE — GLOVE ORANGE PI 7   MSG9070

## (undated) DEVICE — CONNECTOR TBNG AUX H2O JET DISP FOR OLY 160/180 SER

## (undated) DEVICE — GLOVE ORANGE PI 8 1/2   MSG9085

## (undated) DEVICE — IV START KIT: Brand: MEDLINE INDUSTRIES, INC.

## (undated) DEVICE — SOLUTION IV 1000ML 0.45% SOD CHL PH 5 INJ USP VIAFLX PLAS

## (undated) DEVICE — YANKAUER,POOLE TIP,STERILE,50/CS: Brand: MEDLINE

## (undated) DEVICE — BLANKET WRM W29.9XL79.1IN UP BODY FORC AIR MISTRAL-AIR

## (undated) DEVICE — Device

## (undated) DEVICE — SPONGE LAP W18XL18IN WHT COT 4 PLY FLD STRUNG RADPQ DISP ST